# Patient Record
Sex: MALE | Race: WHITE | NOT HISPANIC OR LATINO
[De-identification: names, ages, dates, MRNs, and addresses within clinical notes are randomized per-mention and may not be internally consistent; named-entity substitution may affect disease eponyms.]

---

## 2023-03-17 ENCOUNTER — NON-APPOINTMENT (OUTPATIENT)
Age: 58
End: 2023-03-17

## 2023-03-17 ENCOUNTER — APPOINTMENT (OUTPATIENT)
Dept: COLORECTAL SURGERY | Facility: CLINIC | Age: 58
End: 2023-03-17
Payer: COMMERCIAL

## 2023-03-17 VITALS
HEART RATE: 76 BPM | DIASTOLIC BLOOD PRESSURE: 75 MMHG | BODY MASS INDEX: 25.8 KG/M2 | TEMPERATURE: 97.9 F | WEIGHT: 201 LBS | SYSTOLIC BLOOD PRESSURE: 112 MMHG | HEIGHT: 74 IN

## 2023-03-17 PROBLEM — Z00.00 ENCOUNTER FOR PREVENTIVE HEALTH EXAMINATION: Status: ACTIVE | Noted: 2023-03-17

## 2023-03-17 PROCEDURE — 99204 OFFICE O/P NEW MOD 45 MIN: CPT

## 2023-03-17 NOTE — PHYSICAL EXAM
[No HSM] : no hepatosplenomegaly [Normal] : was normal [None] : there was no rectal mass  [No Rash or Lesion] : No rash or lesion [Alert] : alert [Abdomen Masses] : No abdominal masses [Abdomen Tenderness] : ~T No ~M abdominal tenderness [FreeTextEntry1] : Medical assistant was present for the entire exam.\par \par Anoscopy was performed for evaluation of the patients rectal bleeding  history .\par The risks, benefits and alternatives were reviewed.\par \par A lighted anoscope was passed into the anal canal and the entire anal mucosal surface was inspected..  \par The findings revealed moderate internal hemorrhoids.\par No masses or lesions were identified.\par \par

## 2023-03-17 NOTE — ASSESSMENT
[FreeTextEntry1] : Reviewed with the patient and his wife that his history and prior surgical procedures are consistent with a GI bleed of unclear source.  Although I suspect that his bleeding is coming from a diverticular etiology–right-sided diverticular disease I have expressed to them that it would be most beneficial to further exclude secondary sources of GI bleeding before secondary surgical procedures are planned.  Therefore I have recommended that if he has recurrent bleeding he return to the hospital for a CT angiogram.  If negative a provocative angiogram would be appropriate.  If his small bowel and secondary source of bleeding is not identified and or a primary source of colonic bleeding is identified we will proceed with a definitive resection of the remaining colon–completion colectomy and ileorectal anastomosis.  I have outlined to them the risk, benefits alternatives of surgery.  All questions answered.

## 2023-03-22 ENCOUNTER — NON-APPOINTMENT (OUTPATIENT)
Age: 58
End: 2023-03-22

## 2023-04-03 ENCOUNTER — APPOINTMENT (OUTPATIENT)
Dept: COLORECTAL SURGERY | Facility: CLINIC | Age: 58
End: 2023-04-03
Payer: COMMERCIAL

## 2023-04-03 DIAGNOSIS — Z86.010 PERSONAL HISTORY OF COLONIC POLYPS: ICD-10-CM

## 2023-04-03 DIAGNOSIS — Z87.19 PERSONAL HISTORY OF OTHER DISEASES OF THE DIGESTIVE SYSTEM: ICD-10-CM

## 2023-04-03 DIAGNOSIS — K92.2 GASTROINTESTINAL HEMORRHAGE, UNSPECIFIED: ICD-10-CM

## 2023-04-03 DIAGNOSIS — Z87.442 PERSONAL HISTORY OF URINARY CALCULI: ICD-10-CM

## 2023-04-03 DIAGNOSIS — I10 ESSENTIAL (PRIMARY) HYPERTENSION: ICD-10-CM

## 2023-04-03 DIAGNOSIS — E78.5 HYPERLIPIDEMIA, UNSPECIFIED: ICD-10-CM

## 2023-04-03 DIAGNOSIS — Z82.49 FAMILY HISTORY OF ISCHEMIC HEART DISEASE AND OTHER DISEASES OF THE CIRCULATORY SYSTEM: ICD-10-CM

## 2023-04-03 DIAGNOSIS — G47.30 SLEEP APNEA, UNSPECIFIED: ICD-10-CM

## 2023-04-03 DIAGNOSIS — Z78.9 OTHER SPECIFIED HEALTH STATUS: ICD-10-CM

## 2023-04-03 PROCEDURE — 99202 OFFICE O/P NEW SF 15 MIN: CPT | Mod: 95

## 2023-04-05 NOTE — ASSESSMENT
[FreeTextEntry1] : Pt with ileal diverticulum and severe diverticulosis throughout the colon, h/o anemia and syncopal episodes 2/2 GI bleed. Pt presents for surgical planning discussion and has been recommended to proceed with completion colectomy with ileorectal anastomosis.\par \par Patient was informed of risk, benefits and alternatives of surgery including but limited risk of bleeding, infection, hernia, obstruction, change in bowel habits, need for future procedures, risk of recurrent bleeding, risk of anastomotic leak, change in bowel habits with increased stool frequency, need for ileostomy or colostomy creation, heart or lung complications.\par \par

## 2023-04-05 NOTE — HISTORY OF PRESENT ILLNESS
[Home] : at home, [unfilled] , at the time of the visit. [Medical Office: ([facility])___] : at the medical office located in  [Other:____] : [unfilled] [FreeTextEntry1] : 56 yo M w/ severe diverticulosis presents for follow up surgical discussion of GI bleeding\par Known to LAMIN Deleon\par PMH HTN, HLD, sleep apnea, kidney stones\par PSH hernia repair, partial colon resection, h/o RBL x 2 (1990s, and recently w/ Dr. Deleon)\par \par h/o heavy rectal bleeding over the years. Worsened in 2018 and had rectal bleeding a few times while at work. Had presented to ERs in the past, colonoscopies have been completed multiple times, no identifiable source of bleeding. 3/2018 h/o syncopal episode, seen at [facility], s/p transfusions x 3. \par April 2019 seen by CRS Korey Godinez who suggested colon resection for diverticulosis as possible source of bleed\par Aug 2021 Hospitalized again at [facility] for rectal bleeding, s/p blood transfusion x 3, transferred to [facility] in [place], bleeding eventually resolved. Colonoscopy completed, s/p partial colon resection 10/2021 w/ Dr. Godinez for "bleeding vein"\par \par Pt had been doing well and no bleeding episodes until December 2022. Pt had return of bleeding in setting of straining and presented to [facility], hospitalized, colonoscopy performed and area of active bleed stapled per pt. A couple months later had bleeding again which resolved on its own. Colonoscopy noted polyp which was removed. \par Had BRB again, seen at [facility] ER, hgb over 7, no imaging or interventions per pt.\par Seen by LAMIN Deleon, s/p rubber band ligation ~ one month ago which he tolerated well.\par \par On 3/9, pt had three instances of heavy bleeding. Seen at [facility] ER, had more rectal bleeding, pt reports while waiting he stood up, had syncopal episode, then pt noticed "spurting" blood, s/p transfusion. \par \par Colonoscopy completed inpatient on 3/10/12 noted few small diverticulum 10 in ileum 10 cm from ICV as well as innumerable small and large diverticula in the entire colon. No evidence of diverticular bleeding. A 5 mm post hemorrhoidal banding scar w/ adjusent clot was found in the rectum. No active bleeding in rectum. Non bleeding internal hemorrhoids noted.\par \par Last seen for initial consult 3/17/23 for post ER follow up. Pt recommended to present to ER if bleeding recurs. In interim, pt has contacted office several times requesting surgery.\par \par Pt presents for further discussion and denies return of rectal bleeding. Pt has been taking Metamucil, stool softeners as recommended as well as drinking herbal teas (chamomile, mint) in the meantime and has 1-2 daily BMs w/ complete evacuation after 2nd BM.\par \par \par

## 2023-04-11 RX ORDER — CIPROFLOXACIN HYDROCHLORIDE 500 MG/1
500 TABLET, FILM COATED ORAL
Qty: 2 | Refills: 0 | Status: ACTIVE | COMMUNITY
Start: 2023-04-11 | End: 1900-01-01

## 2023-04-11 RX ORDER — METRONIDAZOLE 500 MG/1
500 TABLET ORAL
Qty: 6 | Refills: 0 | Status: ACTIVE | COMMUNITY
Start: 2023-04-11 | End: 1900-01-01

## 2023-04-24 ENCOUNTER — TRANSCRIPTION ENCOUNTER (OUTPATIENT)
Age: 58
End: 2023-04-24

## 2023-04-24 VITALS
OXYGEN SATURATION: 100 % | WEIGHT: 202.6 LBS | SYSTOLIC BLOOD PRESSURE: 143 MMHG | DIASTOLIC BLOOD PRESSURE: 79 MMHG | HEIGHT: 75 IN | TEMPERATURE: 98 F | HEART RATE: 61 BPM | RESPIRATION RATE: 16 BRPM

## 2023-04-24 NOTE — PATIENT PROFILE ADULT - MONEY FOR FOOD
PT presents for ECG ordered by [facility] NP.    ECG done and given to [facility] to read.    Will call pt with results.    no

## 2023-04-25 ENCOUNTER — RESULT REVIEW (OUTPATIENT)
Age: 58
End: 2023-04-25

## 2023-04-25 ENCOUNTER — APPOINTMENT (OUTPATIENT)
Dept: COLORECTAL SURGERY | Facility: HOSPITAL | Age: 58
End: 2023-04-25
Payer: COMMERCIAL

## 2023-04-25 ENCOUNTER — INPATIENT (INPATIENT)
Facility: HOSPITAL | Age: 58
LOS: 13 days | Discharge: HOME HEALTH | DRG: 330 | End: 2023-05-09
Attending: SURGERY | Admitting: SURGERY
Payer: COMMERCIAL

## 2023-04-25 DIAGNOSIS — K57.90 DIVERTICULOSIS OF INTESTINE, PART UNSPECIFIED, WITHOUT PERFORATION OR ABSCESS WITHOUT BLEEDING: ICD-10-CM

## 2023-04-25 DIAGNOSIS — Z98.890 OTHER SPECIFIED POSTPROCEDURAL STATES: Chronic | ICD-10-CM

## 2023-04-25 DIAGNOSIS — Z90.49 ACQUIRED ABSENCE OF OTHER SPECIFIED PARTS OF DIGESTIVE TRACT: Chronic | ICD-10-CM

## 2023-04-25 LAB
BLD GP AB SCN SERPL QL: NEGATIVE — SIGNIFICANT CHANGE UP
RH IG SCN BLD-IMP: POSITIVE — SIGNIFICANT CHANGE UP

## 2023-04-25 PROCEDURE — 44150 REMOVAL OF COLON: CPT | Mod: GC

## 2023-04-25 PROCEDURE — 88304 TISSUE EXAM BY PATHOLOGIST: CPT | Mod: 26

## 2023-04-25 PROCEDURE — 88307 TISSUE EXAM BY PATHOLOGIST: CPT | Mod: 26

## 2023-04-25 DEVICE — STAPLER COVIDIEN EEA CIRCULAR DST 28MM 4.5MM BLUE: Type: IMPLANTABLE DEVICE | Status: FUNCTIONAL

## 2023-04-25 DEVICE — STAPLER ECHELON CONTOUR CURVED CUTTER 40MM WITH (GREEN) RELOAD: Type: IMPLANTABLE DEVICE | Status: FUNCTIONAL

## 2023-04-25 DEVICE — STAPLER COVIDIEN TRI-STAPLE 60MM PURPLE RELOAD: Type: IMPLANTABLE DEVICE | Status: FUNCTIONAL

## 2023-04-25 DEVICE — CLIP APPLIER ETHICON LIGACLIP 11.5" MEDIUM: Type: IMPLANTABLE DEVICE | Status: FUNCTIONAL

## 2023-04-25 RX ORDER — HYDROMORPHONE HYDROCHLORIDE 2 MG/ML
30 INJECTION INTRAMUSCULAR; INTRAVENOUS; SUBCUTANEOUS
Refills: 0 | Status: DISCONTINUED | OUTPATIENT
Start: 2023-04-25 | End: 2023-04-27

## 2023-04-25 RX ORDER — DILTIAZEM HCL 120 MG
360 CAPSULE, EXT RELEASE 24 HR ORAL DAILY
Refills: 0 | Status: DISCONTINUED | OUTPATIENT
Start: 2023-04-26 | End: 2023-05-09

## 2023-04-25 RX ORDER — HYDROMORPHONE HYDROCHLORIDE 2 MG/ML
0.5 INJECTION INTRAMUSCULAR; INTRAVENOUS; SUBCUTANEOUS
Refills: 0 | Status: DISCONTINUED | OUTPATIENT
Start: 2023-04-25 | End: 2023-04-25

## 2023-04-25 RX ORDER — ACETAMINOPHEN 500 MG
1000 TABLET ORAL ONCE
Refills: 0 | Status: COMPLETED | OUTPATIENT
Start: 2023-04-25 | End: 2023-04-25

## 2023-04-25 RX ORDER — FENOFIBRATE,MICRONIZED 130 MG
145 CAPSULE ORAL DAILY
Refills: 0 | Status: DISCONTINUED | OUTPATIENT
Start: 2023-04-25 | End: 2023-05-09

## 2023-04-25 RX ORDER — HEPARIN SODIUM 5000 [USP'U]/ML
5000 INJECTION INTRAVENOUS; SUBCUTANEOUS ONCE
Refills: 0 | Status: COMPLETED | OUTPATIENT
Start: 2023-04-25 | End: 2023-04-25

## 2023-04-25 RX ORDER — ONDANSETRON 8 MG/1
4 TABLET, FILM COATED ORAL EVERY 6 HOURS
Refills: 0 | Status: DISCONTINUED | OUTPATIENT
Start: 2023-04-25 | End: 2023-05-09

## 2023-04-25 RX ORDER — DILTIAZEM HCL 120 MG
360 CAPSULE, EXT RELEASE 24 HR ORAL DAILY
Refills: 0 | Status: DISCONTINUED | OUTPATIENT
Start: 2023-04-25 | End: 2023-04-25

## 2023-04-25 RX ORDER — HYDROMORPHONE HYDROCHLORIDE 2 MG/ML
0.5 INJECTION INTRAMUSCULAR; INTRAVENOUS; SUBCUTANEOUS ONCE
Refills: 0 | Status: DISCONTINUED | OUTPATIENT
Start: 2023-04-25 | End: 2023-04-25

## 2023-04-25 RX ORDER — SODIUM CHLORIDE 9 MG/ML
1000 INJECTION, SOLUTION INTRAVENOUS
Refills: 0 | Status: DISCONTINUED | OUTPATIENT
Start: 2023-04-25 | End: 2023-04-27

## 2023-04-25 RX ORDER — KETOROLAC TROMETHAMINE 30 MG/ML
15 SYRINGE (ML) INJECTION EVERY 6 HOURS
Refills: 0 | Status: DISCONTINUED | OUTPATIENT
Start: 2023-04-25 | End: 2023-04-28

## 2023-04-25 RX ORDER — ACETAMINOPHEN 500 MG
1000 TABLET ORAL EVERY 6 HOURS
Refills: 0 | Status: DISCONTINUED | OUTPATIENT
Start: 2023-04-25 | End: 2023-05-01

## 2023-04-25 RX ORDER — NALOXONE HYDROCHLORIDE 4 MG/.1ML
0.1 SPRAY NASAL
Refills: 0 | Status: DISCONTINUED | OUTPATIENT
Start: 2023-04-25 | End: 2023-05-09

## 2023-04-25 RX ORDER — PANTOPRAZOLE SODIUM 20 MG/1
40 TABLET, DELAYED RELEASE ORAL
Refills: 0 | Status: DISCONTINUED | OUTPATIENT
Start: 2023-04-25 | End: 2023-05-02

## 2023-04-25 RX ORDER — HEPARIN SODIUM 5000 [USP'U]/ML
5000 INJECTION INTRAVENOUS; SUBCUTANEOUS EVERY 8 HOURS
Refills: 0 | Status: DISCONTINUED | OUTPATIENT
Start: 2023-04-25 | End: 2023-05-01

## 2023-04-25 RX ORDER — HYDROMORPHONE HYDROCHLORIDE 2 MG/ML
0.5 INJECTION INTRAMUSCULAR; INTRAVENOUS; SUBCUTANEOUS EVERY 4 HOURS
Refills: 0 | Status: DISCONTINUED | OUTPATIENT
Start: 2023-04-25 | End: 2023-04-25

## 2023-04-25 RX ADMIN — HEPARIN SODIUM 5000 UNIT(S): 5000 INJECTION INTRAVENOUS; SUBCUTANEOUS at 19:25

## 2023-04-25 RX ADMIN — Medication 15 MILLIGRAM(S): at 18:13

## 2023-04-25 RX ADMIN — HYDROMORPHONE HYDROCHLORIDE 0.5 MILLIGRAM(S): 2 INJECTION INTRAMUSCULAR; INTRAVENOUS; SUBCUTANEOUS at 14:06

## 2023-04-25 RX ADMIN — HEPARIN SODIUM 5000 UNIT(S): 5000 INJECTION INTRAVENOUS; SUBCUTANEOUS at 08:14

## 2023-04-25 RX ADMIN — Medication 1000 MILLIGRAM(S): at 23:25

## 2023-04-25 RX ADMIN — Medication 1000 MILLIGRAM(S): at 18:13

## 2023-04-25 RX ADMIN — Medication 1000 MILLIGRAM(S): at 14:39

## 2023-04-25 RX ADMIN — HYDROMORPHONE HYDROCHLORIDE 30 MILLILITER(S): 2 INJECTION INTRAMUSCULAR; INTRAVENOUS; SUBCUTANEOUS at 17:13

## 2023-04-25 RX ADMIN — HYDROMORPHONE HYDROCHLORIDE 0.5 MILLIGRAM(S): 2 INJECTION INTRAMUSCULAR; INTRAVENOUS; SUBCUTANEOUS at 13:25

## 2023-04-25 RX ADMIN — HYDROMORPHONE HYDROCHLORIDE 0.5 MILLIGRAM(S): 2 INJECTION INTRAMUSCULAR; INTRAVENOUS; SUBCUTANEOUS at 14:57

## 2023-04-25 RX ADMIN — Medication 400 MILLIGRAM(S): at 14:35

## 2023-04-25 RX ADMIN — Medication 1000 MILLIGRAM(S): at 08:11

## 2023-04-25 RX ADMIN — Medication 15 MILLIGRAM(S): at 23:40

## 2023-04-25 RX ADMIN — Medication 15 MILLIGRAM(S): at 23:24

## 2023-04-25 RX ADMIN — HYDROMORPHONE HYDROCHLORIDE 0.5 MILLIGRAM(S): 2 INJECTION INTRAMUSCULAR; INTRAVENOUS; SUBCUTANEOUS at 14:00

## 2023-04-25 NOTE — BRIEF OPERATIVE NOTE - OPERATION/FINDINGS
Open completion colectomy (total) with ileorectal anastomosis and flexible proctoscopy: Antibiotic and DVT prophylaxis on board. Patient supine and prepped in standard fashion. Time out done. Intra-abdominal cavity insufflated with Veress needled at Juarez's point. Optiview 5mm trocar used to enter at Juarez's point and immediately we saw extensive adhesions to midline, extending all the way down to the pelvis. At this point, conversion to open was done through midline and lysis of adhesions carried out with electrocautery. No bowel injuries noted. Started our dissection around rectum to access sacral space and release prior anastomosis (of note, anastomosis was tattooed). Identification of bilateral iliac vessels as well as ureters done. Rectum freed up and transected with circular stapler. No complications. Proceeded to take down descending, transverse and ascending colon from lateral to medial by dividing Toldtz. Mesenteric vascular supply (ileocolic, right, middle and left colic) was carefully divided using energy device and hemostasis was confirmed. Healthy ileum identified and transected. Specimen sent to pathology. Side to end ileorectal anastomosis was created using EEA 28. No complications.  Flexible proctoscopy done to confirm adequate anastomosis and hemostasis. Negative leak test. No drains were placed. No spillage either. Hemostasis confirmed again. Fascia closed with two double stranded PDS 0 running fashion. Wound extensively irrigated with saline and midline wound closed with staples. Clean dressings applied. Trocar wound closed with monocryl and dermabond. Patient tolerated procedure well and was sent to recovery room in stable condition.

## 2023-04-25 NOTE — PROGRESS NOTE ADULT - SUBJECTIVE AND OBJECTIVE BOX
POST OPERATIVE CHECK    S: Denies n/v/cp/sob. Having pain with breathing or laughing that is poorly controlled.     O:     T(C): 36.3 (04-25-23 @ 12:25), Max: 36.4 (04-25-23 @ 08:21)  HR: 66 (04-25-23 @ 14:05) (58 - 84)  BP: 115/66 (04-25-23 @ 13:55) (110/66 - 143/79)  RR: 16 (04-25-23 @ 12:55) (16 - 16)  SpO2: 97% (04-25-23 @ 14:05) (95% - 100%)    Physical Exam:     General: Awake, alert. Appears uncomfortable.    CV: HDS, RRR. WWP  Pulm: 2L NC  Abd: s/nt/nd. Midline laparotomy incision w/ some strikethrough. Port site c/d/i.    Extremity: SCDs on and functioning.   T/L/D: Mcmanus w/ clear yellow urine.     I/O (24h)  O: UOP ~60/hr     Labs: No labs     A/P: 57 year old male with PMH of HTN, HLD, JOSE, diverticulitis presents for colon resection now s/p open total colectomy 4/25    ERAS  CLD/IVF  Pain nausea control  SQH/SCDs/OOBA  Home Cardizem

## 2023-04-25 NOTE — H&P ADULT - ASSESSMENT
56 yo M presents for evaluation of "heavy rectal bleeding," referred by Dr. Aime Deleon  PMH HTN, HLD, sleep apnea, kidney stones  PSH hernia repair, partial colon resection, h/o RBL x 2 (1990s, and recently w/ Dr. Deleon)     Patient has been extensively evaluated at clinics for heavy rectal bleeding that has not been responsive to other means of management. Extensive workup has been done and consistent with pancolonic diverticulosis.     Plan: To OR for robotic completion colectomy with possible ileorectal anastomosis, possible ileostomy, possible colostomy, possible open; with admission to colorectal service for perioperative care and monitoring.

## 2023-04-25 NOTE — H&P ADULT - REASON FOR ADMISSION
58 y/o male patient with extensive colonic diverticulitis and rectal bleeding; comes in for colorectal surgery management and admission to surgery valdovinos for perioperative care.

## 2023-04-26 LAB
ANION GAP SERPL CALC-SCNC: 13 MMOL/L — SIGNIFICANT CHANGE UP (ref 5–17)
BUN SERPL-MCNC: 15 MG/DL — SIGNIFICANT CHANGE UP (ref 7–23)
CALCIUM SERPL-MCNC: 8.7 MG/DL — SIGNIFICANT CHANGE UP (ref 8.4–10.5)
CHLORIDE SERPL-SCNC: 103 MMOL/L — SIGNIFICANT CHANGE UP (ref 96–108)
CO2 SERPL-SCNC: 22 MMOL/L — SIGNIFICANT CHANGE UP (ref 22–31)
CREAT SERPL-MCNC: 0.92 MG/DL — SIGNIFICANT CHANGE UP (ref 0.5–1.3)
EGFR: 96 ML/MIN/1.73M2 — SIGNIFICANT CHANGE UP
GLUCOSE SERPL-MCNC: 136 MG/DL — HIGH (ref 70–99)
HCT VFR BLD CALC: 31.4 % — LOW (ref 39–50)
HCV AB S/CO SERPL IA: 0.3 S/CO — SIGNIFICANT CHANGE UP (ref 0–0.99)
HCV AB SERPL-IMP: SIGNIFICANT CHANGE UP
HGB BLD-MCNC: 9.3 G/DL — LOW (ref 13–17)
MAGNESIUM SERPL-MCNC: 2 MG/DL — SIGNIFICANT CHANGE UP (ref 1.6–2.6)
MCHC RBC-ENTMCNC: 21.8 PG — LOW (ref 27–34)
MCHC RBC-ENTMCNC: 29.6 GM/DL — LOW (ref 32–36)
MCV RBC AUTO: 73.7 FL — LOW (ref 80–100)
NRBC # BLD: 0 /100 WBCS — SIGNIFICANT CHANGE UP (ref 0–0)
PHOSPHATE SERPL-MCNC: 3.2 MG/DL — SIGNIFICANT CHANGE UP (ref 2.5–4.5)
PLATELET # BLD AUTO: 383 K/UL — SIGNIFICANT CHANGE UP (ref 150–400)
POTASSIUM SERPL-MCNC: 3.6 MMOL/L — SIGNIFICANT CHANGE UP (ref 3.5–5.3)
POTASSIUM SERPL-SCNC: 3.6 MMOL/L — SIGNIFICANT CHANGE UP (ref 3.5–5.3)
RBC # BLD: 4.26 M/UL — SIGNIFICANT CHANGE UP (ref 4.2–5.8)
RBC # FLD: 21.6 % — HIGH (ref 10.3–14.5)
SODIUM SERPL-SCNC: 138 MMOL/L — SIGNIFICANT CHANGE UP (ref 135–145)
WBC # BLD: 10.22 K/UL — SIGNIFICANT CHANGE UP (ref 3.8–10.5)
WBC # FLD AUTO: 10.22 K/UL — SIGNIFICANT CHANGE UP (ref 3.8–10.5)

## 2023-04-26 RX ORDER — CALCIUM CARBONATE 500(1250)
1 TABLET ORAL ONCE
Refills: 0 | Status: COMPLETED | OUTPATIENT
Start: 2023-04-26 | End: 2023-04-26

## 2023-04-26 RX ORDER — POTASSIUM CHLORIDE 20 MEQ
40 PACKET (EA) ORAL ONCE
Refills: 0 | Status: COMPLETED | OUTPATIENT
Start: 2023-04-26 | End: 2023-04-26

## 2023-04-26 RX ADMIN — Medication 1000 MILLIGRAM(S): at 00:05

## 2023-04-26 RX ADMIN — Medication 40 MILLIEQUIVALENT(S): at 09:16

## 2023-04-26 RX ADMIN — SODIUM CHLORIDE 40 MILLILITER(S): 9 INJECTION, SOLUTION INTRAVENOUS at 07:49

## 2023-04-26 RX ADMIN — Medication 145 MILLIGRAM(S): at 16:26

## 2023-04-26 RX ADMIN — Medication 1000 MILLIGRAM(S): at 05:21

## 2023-04-26 RX ADMIN — Medication 360 MILLIGRAM(S): at 05:48

## 2023-04-26 RX ADMIN — Medication 1 TABLET(S): at 00:42

## 2023-04-26 RX ADMIN — Medication 1000 MILLIGRAM(S): at 06:00

## 2023-04-26 RX ADMIN — HYDROMORPHONE HYDROCHLORIDE 30 MILLILITER(S): 2 INJECTION INTRAMUSCULAR; INTRAVENOUS; SUBCUTANEOUS at 18:57

## 2023-04-26 RX ADMIN — HEPARIN SODIUM 5000 UNIT(S): 5000 INJECTION INTRAVENOUS; SUBCUTANEOUS at 05:22

## 2023-04-26 RX ADMIN — PANTOPRAZOLE SODIUM 40 MILLIGRAM(S): 20 TABLET, DELAYED RELEASE ORAL at 05:21

## 2023-04-26 RX ADMIN — Medication 15 MILLIGRAM(S): at 05:22

## 2023-04-26 RX ADMIN — Medication 15 MILLIGRAM(S): at 06:00

## 2023-04-26 RX ADMIN — Medication 1000 MILLIGRAM(S): at 12:01

## 2023-04-26 RX ADMIN — HEPARIN SODIUM 5000 UNIT(S): 5000 INJECTION INTRAVENOUS; SUBCUTANEOUS at 12:01

## 2023-04-26 RX ADMIN — HEPARIN SODIUM 5000 UNIT(S): 5000 INJECTION INTRAVENOUS; SUBCUTANEOUS at 20:11

## 2023-04-26 RX ADMIN — Medication 15 MILLIGRAM(S): at 12:01

## 2023-04-26 NOTE — PHYSICAL THERAPY INITIAL EVALUATION ADULT - PERTINENT HX OF CURRENT PROBLEM, REHAB EVAL
58 yo M presents for evaluation of "heavy rectal bleeding," referred by Dr. Aime Deleon  PMH HTN, HLD, sleep apnea, kidney stones  PSH hernia repair, partial colon resection, h/o RBL x 2 (1990s, and recently w/ Dr. Deleon)     Patient has been extensively evaluated at clinics for heavy rectal bleeding that has not been responsive to other means of management. Extensive workup has been done and consistent with pancolonic diverticulosis.     Plan: To OR for robotic completion colectomy with possible ileorectal anastomosis, possible ileostomy, possible colostomy, possible open; with admission to colorectal service for perioperative care and monitoring.
none

## 2023-04-26 NOTE — PROVIDER CONTACT NOTE (OTHER) - SITUATION
Pt has a current provider note that was charted on the wrong pt. The wrong note was charted at 0703 by TRIP Lee regarding K level. Please disregard. Confirmed with Throughput RN and PA.
heart rate dropped to 48 bpm. pt asymptomatic.

## 2023-04-26 NOTE — ANESTHESIA FOLLOW-UP NOTE - NSEVALATIONFT_GEN_ALL_CORE
Patient seen at bedside this AM. Resting comfortably in NAD. Reports mild incisional site pain being controlled with PCA. No apparent anesthetic issues or concerns. VSS

## 2023-04-26 NOTE — PROGRESS NOTE ADULT - SUBJECTIVE AND OBJECTIVE BOX
INTERVAL HPI/OVERNIGHT EVENTS: CLD, -f/-bm. mild distension/NT    STATUS POST:  4/25: diagnostic laparoscopy converted to open total colectomy    POST OPERATIVE DAY #: 1    SUBJECTIVE: Pt seen and examined at bedside this am by surgery team. Reports feeling tired, otherwise no acute complaints. Tolerating diet, pain well controlled. Denies f/n/v/cp/sob.    MEDICATIONS  (STANDING):  acetaminophen     Tablet .. 1000 milliGRAM(s) Oral every 6 hours  diltiazem    milliGRAM(s) Oral daily  fenofibrate Tablet 145 milliGRAM(s) Oral daily  heparin   Injectable 5000 Unit(s) SubCutaneous every 8 hours  HYDROmorphone PCA (1 mG/mL) 30 milliLiter(s) PCA Continuous PCA Continuous  ketorolac   Injectable 15 milliGRAM(s) IV Push every 6 hours  lactated ringers. 1000 milliLiter(s) (40 mL/Hr) IV Continuous <Continuous>  pantoprazole    Tablet 40 milliGRAM(s) Oral before breakfast    MEDICATIONS  (PRN):  naloxone Injectable 0.1 milliGRAM(s) IV Push every 3 minutes PRN For ANY of the following changes in patient status:  A. RR LESS THAN 10 breaths per minute, B. Oxygen saturation LESS THAN 90%, C. Sedation score of 6  ondansetron Injectable 4 milliGRAM(s) IV Push every 6 hours PRN Nausea and/or Vomiting      Vital Signs Last 24 Hrs  T(C): 36.8 (26 Apr 2023 05:25), Max: 36.8 (26 Apr 2023 05:25)  T(F): 98.2 (26 Apr 2023 05:25), Max: 98.2 (26 Apr 2023 05:25)  HR: 66 (26 Apr 2023 03:25) (58 - 84)  BP: 137/78 (26 Apr 2023 03:25) (110/66 - 143/79)  BP(mean): 100 (26 Apr 2023 03:25) (82 - 102)  RR: 16 (26 Apr 2023 03:25) (16 - 18)  SpO2: 94% (26 Apr 2023 03:25) (93% - 100%)    Parameters below as of 26 Apr 2023 03:25  Patient On (Oxygen Delivery Method): room air    PHYSICAL EXAM:    Constitutional: A&Ox3, NAD    Respiratory: non labored breathing, no respiratory distress    Cardiovascular: NSR, RRR    Gastrointestinal: abdomen soft, nd, mdline dressing c/d/i, nt, no rebound or guarding                   Genitourinary: Mcmanus in place draining clear yellow urine     Extremities: wwp, no calf tenderness or edema. SCDs in place       I&O's Detail    25 Apr 2023 07:01  -  26 Apr 2023 07:00  --------------------------------------------------------  IN:    Lactated Ringers: 720 mL    Oral Fluid: 300 mL  Total IN: 1020 mL    OUT:    Indwelling Catheter - Urethral (mL): 1625 mL  Total OUT: 1625 mL    Total NET: -605 mL          LABS:                        9.3    10.22 )-----------( 383      ( 26 Apr 2023 05:30 )             31.4     04-26    138  |  103  |  15  ----------------------------<  136<H>  3.6   |  22  |  0.92    Ca    8.7      26 Apr 2023 05:30  Phos  3.2     04-26  Mg     2.0     04-26            RADIOLOGY & ADDITIONAL STUDIES:

## 2023-04-27 LAB
ANION GAP SERPL CALC-SCNC: 11 MMOL/L — SIGNIFICANT CHANGE UP (ref 5–17)
BUN SERPL-MCNC: 16 MG/DL — SIGNIFICANT CHANGE UP (ref 7–23)
CALCIUM SERPL-MCNC: 9 MG/DL — SIGNIFICANT CHANGE UP (ref 8.4–10.5)
CHLORIDE SERPL-SCNC: 108 MMOL/L — SIGNIFICANT CHANGE UP (ref 96–108)
CO2 SERPL-SCNC: 24 MMOL/L — SIGNIFICANT CHANGE UP (ref 22–31)
CREAT SERPL-MCNC: 1.09 MG/DL — SIGNIFICANT CHANGE UP (ref 0.5–1.3)
EGFR: 78 ML/MIN/1.73M2 — SIGNIFICANT CHANGE UP
GLUCOSE SERPL-MCNC: 91 MG/DL — SIGNIFICANT CHANGE UP (ref 70–99)
HCT VFR BLD CALC: 32 % — LOW (ref 39–50)
HGB BLD-MCNC: 9.5 G/DL — LOW (ref 13–17)
MAGNESIUM SERPL-MCNC: 2 MG/DL — SIGNIFICANT CHANGE UP (ref 1.6–2.6)
MCHC RBC-ENTMCNC: 22.1 PG — LOW (ref 27–34)
MCHC RBC-ENTMCNC: 29.7 GM/DL — LOW (ref 32–36)
MCV RBC AUTO: 74.6 FL — LOW (ref 80–100)
NRBC # BLD: 0 /100 WBCS — SIGNIFICANT CHANGE UP (ref 0–0)
PHOSPHATE SERPL-MCNC: 2.2 MG/DL — LOW (ref 2.5–4.5)
PLATELET # BLD AUTO: 376 K/UL — SIGNIFICANT CHANGE UP (ref 150–400)
POTASSIUM SERPL-MCNC: 3.6 MMOL/L — SIGNIFICANT CHANGE UP (ref 3.5–5.3)
POTASSIUM SERPL-SCNC: 3.6 MMOL/L — SIGNIFICANT CHANGE UP (ref 3.5–5.3)
RBC # BLD: 4.29 M/UL — SIGNIFICANT CHANGE UP (ref 4.2–5.8)
RBC # FLD: 22.5 % — HIGH (ref 10.3–14.5)
SODIUM SERPL-SCNC: 143 MMOL/L — SIGNIFICANT CHANGE UP (ref 135–145)
WBC # BLD: 8 K/UL — SIGNIFICANT CHANGE UP (ref 3.8–10.5)
WBC # FLD AUTO: 8 K/UL — SIGNIFICANT CHANGE UP (ref 3.8–10.5)

## 2023-04-27 RX ORDER — SIMETHICONE 80 MG/1
80 TABLET, CHEWABLE ORAL ONCE
Refills: 0 | Status: COMPLETED | OUTPATIENT
Start: 2023-04-27 | End: 2023-04-27

## 2023-04-27 RX ORDER — POTASSIUM PHOSPHATE, MONOBASIC POTASSIUM PHOSPHATE, DIBASIC 236; 224 MG/ML; MG/ML
15 INJECTION, SOLUTION INTRAVENOUS ONCE
Refills: 0 | Status: COMPLETED | OUTPATIENT
Start: 2023-04-27 | End: 2023-04-27

## 2023-04-27 RX ORDER — OXYCODONE HYDROCHLORIDE 5 MG/1
5 TABLET ORAL EVERY 6 HOURS
Refills: 0 | Status: DISCONTINUED | OUTPATIENT
Start: 2023-04-27 | End: 2023-05-01

## 2023-04-27 RX ORDER — HYDROMORPHONE HYDROCHLORIDE 2 MG/ML
30 INJECTION INTRAMUSCULAR; INTRAVENOUS; SUBCUTANEOUS
Refills: 0 | Status: DISCONTINUED | OUTPATIENT
Start: 2023-04-27 | End: 2023-04-27

## 2023-04-27 RX ADMIN — HEPARIN SODIUM 5000 UNIT(S): 5000 INJECTION INTRAVENOUS; SUBCUTANEOUS at 19:12

## 2023-04-27 RX ADMIN — HEPARIN SODIUM 5000 UNIT(S): 5000 INJECTION INTRAVENOUS; SUBCUTANEOUS at 04:59

## 2023-04-27 RX ADMIN — Medication 1000 MILLIGRAM(S): at 23:25

## 2023-04-27 RX ADMIN — SIMETHICONE 80 MILLIGRAM(S): 80 TABLET, CHEWABLE ORAL at 01:27

## 2023-04-27 RX ADMIN — HEPARIN SODIUM 5000 UNIT(S): 5000 INJECTION INTRAVENOUS; SUBCUTANEOUS at 12:57

## 2023-04-27 RX ADMIN — Medication 15 MILLIGRAM(S): at 18:01

## 2023-04-27 RX ADMIN — PANTOPRAZOLE SODIUM 40 MILLIGRAM(S): 20 TABLET, DELAYED RELEASE ORAL at 07:07

## 2023-04-27 RX ADMIN — Medication 145 MILLIGRAM(S): at 12:57

## 2023-04-27 RX ADMIN — SODIUM CHLORIDE 40 MILLILITER(S): 9 INJECTION, SOLUTION INTRAVENOUS at 12:58

## 2023-04-27 RX ADMIN — Medication 15 MILLIGRAM(S): at 12:57

## 2023-04-27 RX ADMIN — POTASSIUM PHOSPHATE, MONOBASIC POTASSIUM PHOSPHATE, DIBASIC 62.5 MILLIMOLE(S): 236; 224 INJECTION, SOLUTION INTRAVENOUS at 12:56

## 2023-04-27 RX ADMIN — Medication 15 MILLIGRAM(S): at 23:25

## 2023-04-27 RX ADMIN — Medication 1000 MILLIGRAM(S): at 18:01

## 2023-04-27 RX ADMIN — Medication 1000 MILLIGRAM(S): at 12:57

## 2023-04-27 RX ADMIN — HYDROMORPHONE HYDROCHLORIDE 30 MILLILITER(S): 2 INJECTION INTRAMUSCULAR; INTRAVENOUS; SUBCUTANEOUS at 07:33

## 2023-04-27 RX ADMIN — Medication 360 MILLIGRAM(S): at 07:07

## 2023-04-27 NOTE — PROGRESS NOTE ADULT - SUBJECTIVE AND OBJECTIVE BOX
DAILY PROGRESS NOTE    S: Feeling mildly gassy and distended. No flatus or bowel movements yet.     O:     T(C): 37.1 (04-27-23 @ 08:22), Max: 37.1 (04-27-23 @ 08:22)  HR: 70 (04-27-23 @ 08:22) (60 - 77)  BP: 144/83 (04-27-23 @ 08:22) (108/66 - 144/83)  RR: 18 (04-27-23 @ 08:22) (18 - 20)  SpO2: 93% (04-27-23 @ 08:22) (91% - 97%)    Physical Exam:     General: Awake, alert.   CV: HDS, WWP  Pulm: On room air  Abd: Soft/distended/appropriately tender.     I/O (24h)  I: 900  O: 1500 uop  N: -600    Labs:                       9.5    8.00  )-----------( 376      ( 27 Apr 2023 05:30 )             32.0   04-27    143  |  108  |  16  ----------------------------<  91  3.6   |  24  |  1.09    Ca    9.0      27 Apr 2023 05:30  Phos  2.2     04-27  Mg     2.0     04-27    A/P: 57M with PMH of HTN, HLD, JOSE (no CPAP), diverticulitis presents for colon resection now s/p dx lap converted to open total colectomy 4/25.    CLD/IVF  PCA for pain control  SQH/SCDs  OOBA/IS  Home Cardizem

## 2023-04-28 LAB
ANION GAP SERPL CALC-SCNC: 11 MMOL/L — SIGNIFICANT CHANGE UP (ref 5–17)
BUN SERPL-MCNC: 21 MG/DL — SIGNIFICANT CHANGE UP (ref 7–23)
CALCIUM SERPL-MCNC: 9.3 MG/DL — SIGNIFICANT CHANGE UP (ref 8.4–10.5)
CHLORIDE SERPL-SCNC: 106 MMOL/L — SIGNIFICANT CHANGE UP (ref 96–108)
CO2 SERPL-SCNC: 21 MMOL/L — LOW (ref 22–31)
CREAT SERPL-MCNC: 1.15 MG/DL — SIGNIFICANT CHANGE UP (ref 0.5–1.3)
EGFR: 73 ML/MIN/1.73M2 — SIGNIFICANT CHANGE UP
GLUCOSE SERPL-MCNC: 105 MG/DL — HIGH (ref 70–99)
HCT VFR BLD CALC: 36.3 % — LOW (ref 39–50)
HGB BLD-MCNC: 10.5 G/DL — LOW (ref 13–17)
MAGNESIUM SERPL-MCNC: 2 MG/DL — SIGNIFICANT CHANGE UP (ref 1.6–2.6)
MCHC RBC-ENTMCNC: 21.6 PG — LOW (ref 27–34)
MCHC RBC-ENTMCNC: 28.9 GM/DL — LOW (ref 32–36)
MCV RBC AUTO: 74.5 FL — LOW (ref 80–100)
NRBC # BLD: 0 /100 WBCS — SIGNIFICANT CHANGE UP (ref 0–0)
PHOSPHATE SERPL-MCNC: 2.6 MG/DL — SIGNIFICANT CHANGE UP (ref 2.5–4.5)
PLATELET # BLD AUTO: 438 K/UL — HIGH (ref 150–400)
POTASSIUM SERPL-MCNC: 3.4 MMOL/L — LOW (ref 3.5–5.3)
POTASSIUM SERPL-SCNC: 3.4 MMOL/L — LOW (ref 3.5–5.3)
RBC # BLD: 4.87 M/UL — SIGNIFICANT CHANGE UP (ref 4.2–5.8)
RBC # FLD: 22.3 % — HIGH (ref 10.3–14.5)
SODIUM SERPL-SCNC: 138 MMOL/L — SIGNIFICANT CHANGE UP (ref 135–145)
WBC # BLD: 9.08 K/UL — SIGNIFICANT CHANGE UP (ref 3.8–10.5)
WBC # FLD AUTO: 9.08 K/UL — SIGNIFICANT CHANGE UP (ref 3.8–10.5)

## 2023-04-28 RX ORDER — SODIUM CHLORIDE 9 MG/ML
1000 INJECTION, SOLUTION INTRAVENOUS
Refills: 0 | Status: DISCONTINUED | OUTPATIENT
Start: 2023-04-28 | End: 2023-05-02

## 2023-04-28 RX ORDER — POTASSIUM CHLORIDE 20 MEQ
40 PACKET (EA) ORAL ONCE
Refills: 0 | Status: COMPLETED | OUTPATIENT
Start: 2023-04-28 | End: 2023-04-28

## 2023-04-28 RX ORDER — SODIUM CHLORIDE 9 MG/ML
1000 INJECTION, SOLUTION INTRAVENOUS ONCE
Refills: 0 | Status: COMPLETED | OUTPATIENT
Start: 2023-04-28 | End: 2023-04-28

## 2023-04-28 RX ADMIN — Medication 15 MILLIGRAM(S): at 11:44

## 2023-04-28 RX ADMIN — PANTOPRAZOLE SODIUM 40 MILLIGRAM(S): 20 TABLET, DELAYED RELEASE ORAL at 06:23

## 2023-04-28 RX ADMIN — Medication 1000 MILLIGRAM(S): at 06:24

## 2023-04-28 RX ADMIN — HEPARIN SODIUM 5000 UNIT(S): 5000 INJECTION INTRAVENOUS; SUBCUTANEOUS at 13:05

## 2023-04-28 RX ADMIN — Medication 360 MILLIGRAM(S): at 06:25

## 2023-04-28 RX ADMIN — Medication 40 MILLIEQUIVALENT(S): at 09:32

## 2023-04-28 RX ADMIN — SODIUM CHLORIDE 40 MILLILITER(S): 9 INJECTION, SOLUTION INTRAVENOUS at 17:58

## 2023-04-28 RX ADMIN — ONDANSETRON 4 MILLIGRAM(S): 8 TABLET, FILM COATED ORAL at 16:28

## 2023-04-28 RX ADMIN — SODIUM CHLORIDE 1000 MILLILITER(S): 9 INJECTION, SOLUTION INTRAVENOUS at 17:25

## 2023-04-28 RX ADMIN — Medication 1000 MILLIGRAM(S): at 15:35

## 2023-04-28 RX ADMIN — Medication 1000 MILLIGRAM(S): at 21:09

## 2023-04-28 RX ADMIN — HEPARIN SODIUM 5000 UNIT(S): 5000 INJECTION INTRAVENOUS; SUBCUTANEOUS at 05:58

## 2023-04-28 RX ADMIN — HEPARIN SODIUM 5000 UNIT(S): 5000 INJECTION INTRAVENOUS; SUBCUTANEOUS at 21:09

## 2023-04-28 NOTE — PROGRESS NOTE ADULT - SUBJECTIVE AND OBJECTIVE BOX
Overnight: tolerating low fiber diet    POD #3: diagnostic laparoscopy converted to open total colectomy     SUBJECTIVE:  Patient is doing well this morning, seen and examined at bedside, denies any new complaints, reports that he had a bilious bowel movement. Denies any nausea, vomiting, chest pain, shortness of breath, calf tenderness, fever or chills. Tolerating diet though reports that he felt full and distended after having brisket last night.     MEDICATIONS  (STANDING):  acetaminophen     Tablet .. 1000 milliGRAM(s) Oral every 6 hours  diltiazem    milliGRAM(s) Oral daily  fenofibrate Tablet 145 milliGRAM(s) Oral daily  heparin   Injectable 5000 Unit(s) SubCutaneous every 8 hours  ketorolac   Injectable 15 milliGRAM(s) IV Push every 6 hours  pantoprazole    Tablet 40 milliGRAM(s) Oral before breakfast    MEDICATIONS  (PRN):  naloxone Injectable 0.1 milliGRAM(s) IV Push every 3 minutes PRN For ANY of the following changes in patient status:  A. RR LESS THAN 10 breaths per minute, B. Oxygen saturation LESS THAN 90%, C. Sedation score of 6  ondansetron Injectable 4 milliGRAM(s) IV Push every 6 hours PRN Nausea and/or Vomiting  oxyCODONE    IR 5 milliGRAM(s) Oral every 6 hours PRN Severe Pain (7 - 10)      Vital Signs Last 24 Hrs  T(C): 36.8 (28 Apr 2023 08:28), Max: 37.2 (27 Apr 2023 16:21)  T(F): 98.2 (28 Apr 2023 08:28), Max: 98.9 (27 Apr 2023 16:21)  HR: 78 (28 Apr 2023 08:28) (65 - 79)  BP: 126/78 (28 Apr 2023 08:28) (121/89 - 136/88)  BP(mean): --  RR: 18 (28 Apr 2023 08:28) (16 - 18)  SpO2: 93% (28 Apr 2023 08:28) (93% - 94%)    Parameters below as of 28 Apr 2023 08:28  Patient On (Oxygen Delivery Method): room air        PHYSICAL EXAM:  Constitutional: AAOx3, no acute distress  HEENT: NCAT, airway patent  Cardiovascular: RRR, pulses present bilaterally  Respiratory: nonlabored breathing  Gastrointestinal: abdomen soft, nontender, non distended, no rebound or guarding, no palpable masses, incisions are clean, dry and intact without any surrounding erythema, drainage, bleeding or signs of infection   Neuro: no focal deficits  Extremities: non edematous, no calf pain bilaterally    I&O's Detail    27 Apr 2023 07:01  -  28 Apr 2023 07:00  --------------------------------------------------------  IN:    Lactated Ringers: 320 mL  Total IN: 320 mL    OUT:    Voided (mL): 600 mL  Total OUT: 600 mL    Total NET: -280 mL          LABS:                        10.5   9.08  )-----------( 438      ( 28 Apr 2023 06:18 )             36.3     04-28    138  |  106  |  21  ----------------------------<  105<H>  3.4<L>   |  21<L>  |  1.15    Ca    9.3      28 Apr 2023 06:18  Phos  2.6     04-28  Mg     2.0     04-28

## 2023-04-29 LAB
ANION GAP SERPL CALC-SCNC: 11 MMOL/L — SIGNIFICANT CHANGE UP (ref 5–17)
BUN SERPL-MCNC: 27 MG/DL — HIGH (ref 7–23)
CALCIUM SERPL-MCNC: 9.3 MG/DL — SIGNIFICANT CHANGE UP (ref 8.4–10.5)
CHLORIDE SERPL-SCNC: 106 MMOL/L — SIGNIFICANT CHANGE UP (ref 96–108)
CO2 SERPL-SCNC: 21 MMOL/L — LOW (ref 22–31)
CREAT SERPL-MCNC: 1.13 MG/DL — SIGNIFICANT CHANGE UP (ref 0.5–1.3)
EGFR: 75 ML/MIN/1.73M2 — SIGNIFICANT CHANGE UP
GLUCOSE SERPL-MCNC: 102 MG/DL — HIGH (ref 70–99)
HCT VFR BLD CALC: 33.9 % — LOW (ref 39–50)
HGB BLD-MCNC: 9.9 G/DL — LOW (ref 13–17)
MAGNESIUM SERPL-MCNC: 2 MG/DL — SIGNIFICANT CHANGE UP (ref 1.6–2.6)
MCHC RBC-ENTMCNC: 21.7 PG — LOW (ref 27–34)
MCHC RBC-ENTMCNC: 29.2 GM/DL — LOW (ref 32–36)
MCV RBC AUTO: 74.3 FL — LOW (ref 80–100)
NRBC # BLD: 0 /100 WBCS — SIGNIFICANT CHANGE UP (ref 0–0)
PHOSPHATE SERPL-MCNC: 3.5 MG/DL — SIGNIFICANT CHANGE UP (ref 2.5–4.5)
PLATELET # BLD AUTO: 422 K/UL — HIGH (ref 150–400)
POTASSIUM SERPL-MCNC: 3.6 MMOL/L — SIGNIFICANT CHANGE UP (ref 3.5–5.3)
POTASSIUM SERPL-SCNC: 3.6 MMOL/L — SIGNIFICANT CHANGE UP (ref 3.5–5.3)
RBC # BLD: 4.56 M/UL — SIGNIFICANT CHANGE UP (ref 4.2–5.8)
RBC # FLD: 22.3 % — HIGH (ref 10.3–14.5)
SODIUM SERPL-SCNC: 138 MMOL/L — SIGNIFICANT CHANGE UP (ref 135–145)
WBC # BLD: 7.4 K/UL — SIGNIFICANT CHANGE UP (ref 3.8–10.5)
WBC # FLD AUTO: 7.4 K/UL — SIGNIFICANT CHANGE UP (ref 3.8–10.5)

## 2023-04-29 RX ORDER — POTASSIUM CHLORIDE 20 MEQ
20 PACKET (EA) ORAL ONCE
Refills: 0 | Status: COMPLETED | OUTPATIENT
Start: 2023-04-29 | End: 2023-04-29

## 2023-04-29 RX ORDER — SODIUM CHLORIDE 9 MG/ML
1000 INJECTION, SOLUTION INTRAVENOUS ONCE
Refills: 0 | Status: COMPLETED | OUTPATIENT
Start: 2023-04-29 | End: 2023-04-29

## 2023-04-29 RX ADMIN — HEPARIN SODIUM 5000 UNIT(S): 5000 INJECTION INTRAVENOUS; SUBCUTANEOUS at 05:39

## 2023-04-29 RX ADMIN — Medication 1000 MILLIGRAM(S): at 13:22

## 2023-04-29 RX ADMIN — Medication 145 MILLIGRAM(S): at 12:00

## 2023-04-29 RX ADMIN — Medication 20 MILLIEQUIVALENT(S): at 09:06

## 2023-04-29 RX ADMIN — SODIUM CHLORIDE 1000 MILLILITER(S): 9 INJECTION, SOLUTION INTRAVENOUS at 09:06

## 2023-04-29 RX ADMIN — ONDANSETRON 4 MILLIGRAM(S): 8 TABLET, FILM COATED ORAL at 06:15

## 2023-04-29 RX ADMIN — Medication 1000 MILLIGRAM(S): at 19:00

## 2023-04-29 RX ADMIN — HEPARIN SODIUM 5000 UNIT(S): 5000 INJECTION INTRAVENOUS; SUBCUTANEOUS at 21:06

## 2023-04-29 RX ADMIN — SODIUM CHLORIDE 120 MILLILITER(S): 9 INJECTION, SOLUTION INTRAVENOUS at 10:40

## 2023-04-29 RX ADMIN — PANTOPRAZOLE SODIUM 40 MILLIGRAM(S): 20 TABLET, DELAYED RELEASE ORAL at 05:40

## 2023-04-29 RX ADMIN — Medication 1000 MILLIGRAM(S): at 12:00

## 2023-04-29 RX ADMIN — Medication 1000 MILLIGRAM(S): at 06:45

## 2023-04-29 RX ADMIN — Medication 1000 MILLIGRAM(S): at 19:52

## 2023-04-29 RX ADMIN — SODIUM CHLORIDE 120 MILLILITER(S): 9 INJECTION, SOLUTION INTRAVENOUS at 19:01

## 2023-04-29 RX ADMIN — Medication 360 MILLIGRAM(S): at 05:39

## 2023-04-29 RX ADMIN — HEPARIN SODIUM 5000 UNIT(S): 5000 INJECTION INTRAVENOUS; SUBCUTANEOUS at 15:31

## 2023-04-29 RX ADMIN — Medication 1000 MILLIGRAM(S): at 05:45

## 2023-04-29 NOTE — PROGRESS NOTE ADULT - SUBJECTIVE AND OBJECTIVE BOX
Seen and examined  Stable  Frequent BM q 1-2 hours  decreased appettie  Afeb  abd- soft, nd/ mild inc tenderness  wbc 7    Stable post op-   Cont observation  Diet as tolerated.  Restart IV fluids for mild dehydration with frequent loose stools

## 2023-04-29 NOTE — PROGRESS NOTE ADULT - SUBJECTIVE AND OBJECTIVE BOX
INTERVAL HPI/OVERNIGHT EVENTS: slava LRD with improved nausea, -emesis, +f/bm (loose)     STATUS POST: Diagnostic Laparoscopy converted to open total colectomy    POST OPERATIVE DAY #: 4    SUBJECTIVE: Patient in AM seen bedside by chief resident. He notes multiple loose BMs which prevented him from getting adequate sleep. Due to the multiple loose BMs over the day yesterday he did not eat much but denies any nausea or episodes of emesis. He also did not ambulate as much as prior due to feeling worn out from the multiple BMs.       diltiazem    milliGRAM(s) Oral daily  heparin   Injectable 5000 Unit(s) SubCutaneous every 8 hours      Vital Signs Last 24 Hrs  T(C): 36.5 (29 Apr 2023 04:52), Max: 37.1 (28 Apr 2023 13:44)  T(F): 97.7 (29 Apr 2023 04:52), Max: 98.7 (28 Apr 2023 13:44)  HR: 81 (29 Apr 2023 04:52) (78 - 99)  BP: 132/90 (29 Apr 2023 04:52) (118/76 - 142/98)  BP(mean): --  RR: 16 (29 Apr 2023 04:52) (16 - 18)  SpO2: 91% (29 Apr 2023 04:52) (91% - 95%)    Parameters below as of 29 Apr 2023 04:52  Patient On (Oxygen Delivery Method): room air      I&O's Detail    28 Apr 2023 07:01  -  29 Apr 2023 07:00  --------------------------------------------------------  IN:    Lactated Ringers: 520 mL    Lactated Ringers Bolus: 100 mL    Oral Fluid: 900 mL  Total IN: 1520 mL    OUT:  Total OUT: 0 mL    Total NET: 1520 mL          General: NAD, resting comfortably in bed  C/V: NSR  Pulm: Nonlabored breathing, no respiratory distress  Abd: soft, NT/ND. Incision c/d/i  Extrem: WWP, no edema, SCDs in place        LABS:                        9.9    7.40  )-----------( 422      ( 29 Apr 2023 05:30 )             33.9     04-29    138  |  106  |  x   ----------------------------<  102<H>  3.6   |  x   |  1.13    Ca    9.3      29 Apr 2023 05:30  Phos  3.5     04-29  Mg     2.0     04-29            RADIOLOGY & ADDITIONAL STUDIES:

## 2023-04-30 ENCOUNTER — TRANSCRIPTION ENCOUNTER (OUTPATIENT)
Age: 58
End: 2023-04-30

## 2023-04-30 LAB
ANION GAP SERPL CALC-SCNC: 11 MMOL/L — SIGNIFICANT CHANGE UP (ref 5–17)
ANION GAP SERPL CALC-SCNC: 12 MMOL/L — SIGNIFICANT CHANGE UP (ref 5–17)
BUN SERPL-MCNC: 20 MG/DL — SIGNIFICANT CHANGE UP (ref 7–23)
BUN SERPL-MCNC: 26 MG/DL — HIGH (ref 7–23)
CALCIUM SERPL-MCNC: 8.9 MG/DL — SIGNIFICANT CHANGE UP (ref 8.4–10.5)
CALCIUM SERPL-MCNC: 9.3 MG/DL — SIGNIFICANT CHANGE UP (ref 8.4–10.5)
CHLORIDE SERPL-SCNC: 108 MMOL/L — SIGNIFICANT CHANGE UP (ref 96–108)
CHLORIDE SERPL-SCNC: 109 MMOL/L — HIGH (ref 96–108)
CO2 SERPL-SCNC: 19 MMOL/L — LOW (ref 22–31)
CO2 SERPL-SCNC: 20 MMOL/L — LOW (ref 22–31)
CREAT SERPL-MCNC: 0.96 MG/DL — SIGNIFICANT CHANGE UP (ref 0.5–1.3)
CREAT SERPL-MCNC: 0.97 MG/DL — SIGNIFICANT CHANGE UP (ref 0.5–1.3)
EGFR: 90 ML/MIN/1.73M2 — SIGNIFICANT CHANGE UP
EGFR: 91 ML/MIN/1.73M2 — SIGNIFICANT CHANGE UP
GLUCOSE SERPL-MCNC: 136 MG/DL — HIGH (ref 70–99)
GLUCOSE SERPL-MCNC: 93 MG/DL — SIGNIFICANT CHANGE UP (ref 70–99)
HCT VFR BLD CALC: 29.3 % — LOW (ref 39–50)
HCT VFR BLD CALC: 35 % — LOW (ref 39–50)
HGB BLD-MCNC: 10 G/DL — LOW (ref 13–17)
HGB BLD-MCNC: 8.9 G/DL — LOW (ref 13–17)
MAGNESIUM SERPL-MCNC: 1.7 MG/DL — SIGNIFICANT CHANGE UP (ref 1.6–2.6)
MCHC RBC-ENTMCNC: 21.7 PG — LOW (ref 27–34)
MCHC RBC-ENTMCNC: 22.3 PG — LOW (ref 27–34)
MCHC RBC-ENTMCNC: 28.6 GM/DL — LOW (ref 32–36)
MCHC RBC-ENTMCNC: 30.4 GM/DL — LOW (ref 32–36)
MCV RBC AUTO: 73.3 FL — LOW (ref 80–100)
MCV RBC AUTO: 76.1 FL — LOW (ref 80–100)
NRBC # BLD: 0 /100 WBCS — SIGNIFICANT CHANGE UP (ref 0–0)
NRBC # BLD: 0 /100 WBCS — SIGNIFICANT CHANGE UP (ref 0–0)
PHOSPHATE SERPL-MCNC: 3 MG/DL — SIGNIFICANT CHANGE UP (ref 2.5–4.5)
PLATELET # BLD AUTO: 349 K/UL — SIGNIFICANT CHANGE UP (ref 150–400)
PLATELET # BLD AUTO: 367 K/UL — SIGNIFICANT CHANGE UP (ref 150–400)
POTASSIUM SERPL-MCNC: 3.4 MMOL/L — LOW (ref 3.5–5.3)
POTASSIUM SERPL-MCNC: 4.1 MMOL/L — SIGNIFICANT CHANGE UP (ref 3.5–5.3)
POTASSIUM SERPL-SCNC: 3.4 MMOL/L — LOW (ref 3.5–5.3)
POTASSIUM SERPL-SCNC: 4.1 MMOL/L — SIGNIFICANT CHANGE UP (ref 3.5–5.3)
RBC # BLD: 4 M/UL — LOW (ref 4.2–5.8)
RBC # BLD: 4.6 M/UL — SIGNIFICANT CHANGE UP (ref 4.2–5.8)
RBC # FLD: 22.1 % — HIGH (ref 10.3–14.5)
RBC # FLD: 22.5 % — HIGH (ref 10.3–14.5)
SODIUM SERPL-SCNC: 139 MMOL/L — SIGNIFICANT CHANGE UP (ref 135–145)
SODIUM SERPL-SCNC: 140 MMOL/L — SIGNIFICANT CHANGE UP (ref 135–145)
WBC # BLD: 10.38 K/UL — SIGNIFICANT CHANGE UP (ref 3.8–10.5)
WBC # BLD: 9.16 K/UL — SIGNIFICANT CHANGE UP (ref 3.8–10.5)
WBC # FLD AUTO: 10.38 K/UL — SIGNIFICANT CHANGE UP (ref 3.8–10.5)
WBC # FLD AUTO: 9.16 K/UL — SIGNIFICANT CHANGE UP (ref 3.8–10.5)

## 2023-04-30 RX ORDER — DIPHENOXYLATE HCL/ATROPINE 2.5-.025MG
1 TABLET ORAL ONCE
Refills: 0 | Status: DISCONTINUED | OUTPATIENT
Start: 2023-04-30 | End: 2023-04-30

## 2023-04-30 RX ORDER — SODIUM CHLORIDE 9 MG/ML
1000 INJECTION INTRAMUSCULAR; INTRAVENOUS; SUBCUTANEOUS ONCE
Refills: 0 | Status: COMPLETED | OUTPATIENT
Start: 2023-04-30 | End: 2023-04-30

## 2023-04-30 RX ORDER — SODIUM CHLORIDE 9 MG/ML
1000 INJECTION, SOLUTION INTRAVENOUS ONCE
Refills: 0 | Status: COMPLETED | OUTPATIENT
Start: 2023-04-30 | End: 2023-04-30

## 2023-04-30 RX ADMIN — Medication 1 TABLET(S): at 18:03

## 2023-04-30 RX ADMIN — PANTOPRAZOLE SODIUM 40 MILLIGRAM(S): 20 TABLET, DELAYED RELEASE ORAL at 06:04

## 2023-04-30 RX ADMIN — Medication 1000 MILLIGRAM(S): at 18:03

## 2023-04-30 RX ADMIN — SODIUM CHLORIDE 1000 MILLILITER(S): 9 INJECTION INTRAMUSCULAR; INTRAVENOUS; SUBCUTANEOUS at 10:30

## 2023-04-30 RX ADMIN — HEPARIN SODIUM 5000 UNIT(S): 5000 INJECTION INTRAVENOUS; SUBCUTANEOUS at 13:11

## 2023-04-30 RX ADMIN — Medication 1 TABLET(S): at 10:28

## 2023-04-30 RX ADMIN — Medication 1000 MILLIGRAM(S): at 01:02

## 2023-04-30 RX ADMIN — SODIUM CHLORIDE 1000 MILLILITER(S): 9 INJECTION INTRAMUSCULAR; INTRAVENOUS; SUBCUTANEOUS at 18:03

## 2023-04-30 RX ADMIN — SODIUM CHLORIDE 1000 MILLILITER(S): 9 INJECTION, SOLUTION INTRAVENOUS at 05:50

## 2023-04-30 RX ADMIN — HEPARIN SODIUM 5000 UNIT(S): 5000 INJECTION INTRAVENOUS; SUBCUTANEOUS at 21:14

## 2023-04-30 RX ADMIN — Medication 1000 MILLIGRAM(S): at 13:11

## 2023-04-30 RX ADMIN — Medication 1000 MILLIGRAM(S): at 06:03

## 2023-04-30 RX ADMIN — OXYCODONE HYDROCHLORIDE 5 MILLIGRAM(S): 5 TABLET ORAL at 05:52

## 2023-04-30 RX ADMIN — Medication 145 MILLIGRAM(S): at 13:11

## 2023-04-30 RX ADMIN — OXYCODONE HYDROCHLORIDE 5 MILLIGRAM(S): 5 TABLET ORAL at 04:52

## 2023-04-30 RX ADMIN — HEPARIN SODIUM 5000 UNIT(S): 5000 INJECTION INTRAVENOUS; SUBCUTANEOUS at 04:52

## 2023-04-30 RX ADMIN — Medication 360 MILLIGRAM(S): at 06:03

## 2023-04-30 RX ADMIN — SODIUM CHLORIDE 1000 MILLILITER(S): 9 INJECTION INTRAMUSCULAR; INTRAVENOUS; SUBCUTANEOUS at 14:13

## 2023-04-30 NOTE — PROGRESS NOTE ADULT - SUBJECTIVE AND OBJECTIVE BOX
STATUS POST: Diagnostic Laparoscopy converted to open total colectomy    POST OPERATIVE DAY #: 5    SUBJECTIVE: Patient in AM seen bedside by chief resident. Has been having multiple loose stools. Denies N/V.      diltiazem    milliGRAM(s) Oral daily  heparin   Injectable 5000 Unit(s) SubCutaneous every 8 hours    Vital Signs Last 24 Hrs  T(C): 37.2 (30 Apr 2023 09:39), Max: 37.2 (29 Apr 2023 20:30)  T(F): 99 (30 Apr 2023 09:39), Max: 99 (29 Apr 2023 20:30)  HR: 104 (30 Apr 2023 09:39) (76 - 104)  BP: 129/84 (30 Apr 2023 09:39) (129/84 - 143/102)  BP(mean): 96 (29 Apr 2023 20:30) (96 - 96)  RR: 16 (30 Apr 2023 09:39) (16 - 18)  SpO2: 94% (30 Apr 2023 09:39) (92% - 96%)    Parameters below as of 30 Apr 2023 09:39  Patient On (Oxygen Delivery Method): room air    I&O's Summary    29 Apr 2023 07:01  -  30 Apr 2023 07:00  --------------------------------------------------------  IN: 2880 mL / OUT: 450 mL / NET: 2430 mL    30 Apr 2023 07:01  -  30 Apr 2023 11:07  --------------------------------------------------------  IN: 240 mL / OUT: 0 mL / NET: 240 mL          General: NAD, resting comfortably in bed  C/V: NSR  Pulm: Nonlabored breathing, no respiratory distress  Abd: soft, NT/ND. Incision c/d/i  Extrem: WWP, no edema, SCDs in place        LABS:                          10.0   10.38 )-----------( 367      ( 30 Apr 2023 07:14 )             35.0     04-30    139  |  108  |  26<H>  ----------------------------<  93  4.1   |  19<L>  |  0.97    Ca    9.3      30 Apr 2023 07:14  Phos  3.0     04-30  Mg     1.7     04-30                      RADIOLOGY & ADDITIONAL STUDIES:

## 2023-05-01 ENCOUNTER — TRANSCRIPTION ENCOUNTER (OUTPATIENT)
Age: 58
End: 2023-05-01

## 2023-05-01 LAB
ALBUMIN SERPL ELPH-MCNC: 3 G/DL — LOW (ref 3.3–5)
ALP SERPL-CCNC: 78 U/L — SIGNIFICANT CHANGE UP (ref 40–120)
ALT FLD-CCNC: 10 U/L — SIGNIFICANT CHANGE UP (ref 10–45)
AMYLASE FLD-CCNC: >7500 U/L — SIGNIFICANT CHANGE UP
ANION GAP SERPL CALC-SCNC: 12 MMOL/L — SIGNIFICANT CHANGE UP (ref 5–17)
ANION GAP SERPL CALC-SCNC: 9 MMOL/L — SIGNIFICANT CHANGE UP (ref 5–17)
APTT BLD: 30.6 SEC — SIGNIFICANT CHANGE UP (ref 27.5–35.5)
AST SERPL-CCNC: 12 U/L — SIGNIFICANT CHANGE UP (ref 10–40)
BILIRUB SERPL-MCNC: 0.5 MG/DL — SIGNIFICANT CHANGE UP (ref 0.2–1.2)
BLD GP AB SCN SERPL QL: NEGATIVE — SIGNIFICANT CHANGE UP
BUN SERPL-MCNC: 12 MG/DL — SIGNIFICANT CHANGE UP (ref 7–23)
BUN SERPL-MCNC: 15 MG/DL — SIGNIFICANT CHANGE UP (ref 7–23)
CALCIUM SERPL-MCNC: 8.2 MG/DL — LOW (ref 8.4–10.5)
CALCIUM SERPL-MCNC: 9.2 MG/DL — SIGNIFICANT CHANGE UP (ref 8.4–10.5)
CHLORIDE SERPL-SCNC: 105 MMOL/L — SIGNIFICANT CHANGE UP (ref 96–108)
CHLORIDE SERPL-SCNC: 107 MMOL/L — SIGNIFICANT CHANGE UP (ref 96–108)
CO2 SERPL-SCNC: 19 MMOL/L — LOW (ref 22–31)
CO2 SERPL-SCNC: 20 MMOL/L — LOW (ref 22–31)
CREAT SERPL-MCNC: 0.85 MG/DL — SIGNIFICANT CHANGE UP (ref 0.5–1.3)
CREAT SERPL-MCNC: 1.19 MG/DL — SIGNIFICANT CHANGE UP (ref 0.5–1.3)
EGFR: 100 ML/MIN/1.73M2 — SIGNIFICANT CHANGE UP
EGFR: 70 ML/MIN/1.73M2 — SIGNIFICANT CHANGE UP
GLUCOSE SERPL-MCNC: 103 MG/DL — HIGH (ref 70–99)
GLUCOSE SERPL-MCNC: 96 MG/DL — SIGNIFICANT CHANGE UP (ref 70–99)
GRAM STN FLD: SIGNIFICANT CHANGE UP
HCT VFR BLD CALC: 31.5 % — LOW (ref 39–50)
HCT VFR BLD CALC: 36.9 % — LOW (ref 39–50)
HGB BLD-MCNC: 11 G/DL — LOW (ref 13–17)
HGB BLD-MCNC: 9.1 G/DL — LOW (ref 13–17)
MAGNESIUM SERPL-MCNC: 1.6 MG/DL — SIGNIFICANT CHANGE UP (ref 1.6–2.6)
MAGNESIUM SERPL-MCNC: 2 MG/DL — SIGNIFICANT CHANGE UP (ref 1.6–2.6)
MCHC RBC-ENTMCNC: 21.5 PG — LOW (ref 27–34)
MCHC RBC-ENTMCNC: 21.5 PG — LOW (ref 27–34)
MCHC RBC-ENTMCNC: 28.9 GM/DL — LOW (ref 32–36)
MCHC RBC-ENTMCNC: 29.8 GM/DL — LOW (ref 32–36)
MCV RBC AUTO: 72.2 FL — LOW (ref 80–100)
MCV RBC AUTO: 74.3 FL — LOW (ref 80–100)
NRBC # BLD: 0 /100 WBCS — SIGNIFICANT CHANGE UP (ref 0–0)
NRBC # BLD: 0 /100 WBCS — SIGNIFICANT CHANGE UP (ref 0–0)
PHOSPHATE SERPL-MCNC: 2.3 MG/DL — LOW (ref 2.5–4.5)
PHOSPHATE SERPL-MCNC: 3.8 MG/DL — SIGNIFICANT CHANGE UP (ref 2.5–4.5)
PLATELET # BLD AUTO: 382 K/UL — SIGNIFICANT CHANGE UP (ref 150–400)
PLATELET # BLD AUTO: 506 K/UL — HIGH (ref 150–400)
POTASSIUM SERPL-MCNC: 3.7 MMOL/L — SIGNIFICANT CHANGE UP (ref 3.5–5.3)
POTASSIUM SERPL-MCNC: 3.7 MMOL/L — SIGNIFICANT CHANGE UP (ref 3.5–5.3)
POTASSIUM SERPL-SCNC: 3.7 MMOL/L — SIGNIFICANT CHANGE UP (ref 3.5–5.3)
POTASSIUM SERPL-SCNC: 3.7 MMOL/L — SIGNIFICANT CHANGE UP (ref 3.5–5.3)
PROT SERPL-MCNC: 6.2 G/DL — SIGNIFICANT CHANGE UP (ref 6–8.3)
RBC # BLD: 4.24 M/UL — SIGNIFICANT CHANGE UP (ref 4.2–5.8)
RBC # BLD: 5.11 M/UL — SIGNIFICANT CHANGE UP (ref 4.2–5.8)
RBC # FLD: 21.6 % — HIGH (ref 10.3–14.5)
RBC # FLD: 21.8 % — HIGH (ref 10.3–14.5)
RH IG SCN BLD-IMP: POSITIVE — SIGNIFICANT CHANGE UP
SODIUM SERPL-SCNC: 136 MMOL/L — SIGNIFICANT CHANGE UP (ref 135–145)
SODIUM SERPL-SCNC: 136 MMOL/L — SIGNIFICANT CHANGE UP (ref 135–145)
SPECIMEN SOURCE FLD: SIGNIFICANT CHANGE UP
SPECIMEN SOURCE: SIGNIFICANT CHANGE UP
WBC # BLD: 13.85 K/UL — HIGH (ref 3.8–10.5)
WBC # BLD: 9.29 K/UL — SIGNIFICANT CHANGE UP (ref 3.8–10.5)
WBC # FLD AUTO: 13.85 K/UL — HIGH (ref 3.8–10.5)
WBC # FLD AUTO: 9.29 K/UL — SIGNIFICANT CHANGE UP (ref 3.8–10.5)

## 2023-05-01 PROCEDURE — 49406 IMAGE CATH FLUID PERI/RETRO: CPT

## 2023-05-01 PROCEDURE — 44310 ILEOSTOMY/JEJUNOSTOMY: CPT | Mod: 78

## 2023-05-01 PROCEDURE — 74177 CT ABD & PELVIS W/CONTRAST: CPT | Mod: 26

## 2023-05-01 PROCEDURE — 74176 CT ABD & PELVIS W/O CONTRAST: CPT | Mod: 26,59

## 2023-05-01 RX ORDER — POTASSIUM CHLORIDE 20 MEQ
10 PACKET (EA) ORAL
Refills: 0 | Status: COMPLETED | OUTPATIENT
Start: 2023-05-01 | End: 2023-05-01

## 2023-05-01 RX ORDER — SODIUM CHLORIDE 9 MG/ML
1000 INJECTION, SOLUTION INTRAVENOUS ONCE
Refills: 0 | Status: COMPLETED | OUTPATIENT
Start: 2023-05-01 | End: 2023-05-01

## 2023-05-01 RX ORDER — ACETAMINOPHEN 500 MG
1000 TABLET ORAL ONCE
Refills: 0 | Status: COMPLETED | OUTPATIENT
Start: 2023-05-02 | End: 2023-05-02

## 2023-05-01 RX ORDER — PIPERACILLIN AND TAZOBACTAM 4; .5 G/20ML; G/20ML
3.38 INJECTION, POWDER, LYOPHILIZED, FOR SOLUTION INTRAVENOUS ONCE
Refills: 0 | Status: COMPLETED | OUTPATIENT
Start: 2023-05-01 | End: 2023-05-01

## 2023-05-01 RX ORDER — HYDROMORPHONE HYDROCHLORIDE 2 MG/ML
0.5 INJECTION INTRAMUSCULAR; INTRAVENOUS; SUBCUTANEOUS ONCE
Refills: 0 | Status: DISCONTINUED | OUTPATIENT
Start: 2023-05-01 | End: 2023-05-01

## 2023-05-01 RX ORDER — OXYCODONE HYDROCHLORIDE 5 MG/1
5 TABLET ORAL EVERY 6 HOURS
Refills: 0 | Status: DISCONTINUED | OUTPATIENT
Start: 2023-05-01 | End: 2023-05-01

## 2023-05-01 RX ORDER — ACETAMINOPHEN 500 MG
1000 TABLET ORAL ONCE
Refills: 0 | Status: COMPLETED | OUTPATIENT
Start: 2023-05-01 | End: 2023-05-01

## 2023-05-01 RX ORDER — POTASSIUM CHLORIDE 20 MEQ
40 PACKET (EA) ORAL ONCE
Refills: 0 | Status: DISCONTINUED | OUTPATIENT
Start: 2023-05-01 | End: 2023-05-01

## 2023-05-01 RX ORDER — DIATRIZOATE MEGLUMINE 180 MG/ML
30 INJECTION, SOLUTION INTRAVESICAL ONCE
Refills: 0 | Status: COMPLETED | OUTPATIENT
Start: 2023-05-01 | End: 2023-05-01

## 2023-05-01 RX ORDER — ACETAMINOPHEN 500 MG
1000 TABLET ORAL ONCE
Refills: 0 | Status: DISCONTINUED | OUTPATIENT
Start: 2023-05-01 | End: 2023-05-01

## 2023-05-01 RX ORDER — DIPHENOXYLATE HCL/ATROPINE 2.5-.025MG
1 TABLET ORAL
Refills: 0 | Status: DISCONTINUED | OUTPATIENT
Start: 2023-05-01 | End: 2023-05-01

## 2023-05-01 RX ORDER — PIPERACILLIN AND TAZOBACTAM 4; .5 G/20ML; G/20ML
3.38 INJECTION, POWDER, LYOPHILIZED, FOR SOLUTION INTRAVENOUS EVERY 8 HOURS
Refills: 0 | Status: DISCONTINUED | OUTPATIENT
Start: 2023-05-02 | End: 2023-05-06

## 2023-05-01 RX ORDER — HYDROMORPHONE HYDROCHLORIDE 2 MG/ML
0.25 INJECTION INTRAMUSCULAR; INTRAVENOUS; SUBCUTANEOUS EVERY 4 HOURS
Refills: 0 | Status: DISCONTINUED | OUTPATIENT
Start: 2023-05-01 | End: 2023-05-05

## 2023-05-01 RX ORDER — POTASSIUM CHLORIDE 20 MEQ
40 PACKET (EA) ORAL ONCE
Refills: 0 | Status: COMPLETED | OUTPATIENT
Start: 2023-05-01 | End: 2023-05-01

## 2023-05-01 RX ORDER — HYDROMORPHONE HYDROCHLORIDE 2 MG/ML
0.25 INJECTION INTRAMUSCULAR; INTRAVENOUS; SUBCUTANEOUS ONCE
Refills: 0 | Status: DISCONTINUED | OUTPATIENT
Start: 2023-05-01 | End: 2023-05-01

## 2023-05-01 RX ORDER — MAGNESIUM SULFATE 500 MG/ML
2 VIAL (ML) INJECTION ONCE
Refills: 0 | Status: COMPLETED | OUTPATIENT
Start: 2023-05-01 | End: 2023-05-01

## 2023-05-01 RX ORDER — HYDROMORPHONE HYDROCHLORIDE 2 MG/ML
0.5 INJECTION INTRAMUSCULAR; INTRAVENOUS; SUBCUTANEOUS EVERY 4 HOURS
Refills: 0 | Status: DISCONTINUED | OUTPATIENT
Start: 2023-05-01 | End: 2023-05-05

## 2023-05-01 RX ORDER — OXYCODONE HYDROCHLORIDE 5 MG/1
10 TABLET ORAL EVERY 6 HOURS
Refills: 0 | Status: DISCONTINUED | OUTPATIENT
Start: 2023-05-01 | End: 2023-05-01

## 2023-05-01 RX ORDER — POTASSIUM PHOSPHATE, MONOBASIC POTASSIUM PHOSPHATE, DIBASIC 236; 224 MG/ML; MG/ML
15 INJECTION, SOLUTION INTRAVENOUS ONCE
Refills: 0 | Status: COMPLETED | OUTPATIENT
Start: 2023-05-01 | End: 2023-05-01

## 2023-05-01 RX ADMIN — HYDROMORPHONE HYDROCHLORIDE 0.5 MILLIGRAM(S): 2 INJECTION INTRAMUSCULAR; INTRAVENOUS; SUBCUTANEOUS at 12:47

## 2023-05-01 RX ADMIN — HYDROMORPHONE HYDROCHLORIDE 0.5 MILLIGRAM(S): 2 INJECTION INTRAMUSCULAR; INTRAVENOUS; SUBCUTANEOUS at 12:32

## 2023-05-01 RX ADMIN — HEPARIN SODIUM 5000 UNIT(S): 5000 INJECTION INTRAVENOUS; SUBCUTANEOUS at 05:46

## 2023-05-01 RX ADMIN — SODIUM CHLORIDE 1000 MILLILITER(S): 9 INJECTION, SOLUTION INTRAVENOUS at 18:50

## 2023-05-01 RX ADMIN — POTASSIUM PHOSPHATE, MONOBASIC POTASSIUM PHOSPHATE, DIBASIC 62.5 MILLIMOLE(S): 236; 224 INJECTION, SOLUTION INTRAVENOUS at 10:43

## 2023-05-01 RX ADMIN — HEPARIN SODIUM 5000 UNIT(S): 5000 INJECTION INTRAVENOUS; SUBCUTANEOUS at 13:00

## 2023-05-01 RX ADMIN — Medication 100 MILLIEQUIVALENT(S): at 00:56

## 2023-05-01 RX ADMIN — Medication 1000 MILLIGRAM(S): at 00:08

## 2023-05-01 RX ADMIN — HYDROMORPHONE HYDROCHLORIDE 0.25 MILLIGRAM(S): 2 INJECTION INTRAMUSCULAR; INTRAVENOUS; SUBCUTANEOUS at 05:45

## 2023-05-01 RX ADMIN — Medication 25 GRAM(S): at 09:33

## 2023-05-01 RX ADMIN — OXYCODONE HYDROCHLORIDE 5 MILLIGRAM(S): 5 TABLET ORAL at 10:43

## 2023-05-01 RX ADMIN — HYDROMORPHONE HYDROCHLORIDE 0.25 MILLIGRAM(S): 2 INJECTION INTRAMUSCULAR; INTRAVENOUS; SUBCUTANEOUS at 14:11

## 2023-05-01 RX ADMIN — Medication 100 MILLIEQUIVALENT(S): at 04:17

## 2023-05-01 RX ADMIN — Medication 1000 MILLIGRAM(S): at 12:00

## 2023-05-01 RX ADMIN — PIPERACILLIN AND TAZOBACTAM 200 GRAM(S): 4; .5 INJECTION, POWDER, LYOPHILIZED, FOR SOLUTION INTRAVENOUS at 16:16

## 2023-05-01 RX ADMIN — Medication 1000 MILLIGRAM(S): at 06:27

## 2023-05-01 RX ADMIN — Medication 100 MILLIEQUIVALENT(S): at 05:46

## 2023-05-01 RX ADMIN — Medication 100 MILLIEQUIVALENT(S): at 02:57

## 2023-05-01 RX ADMIN — SODIUM CHLORIDE 1000 MILLILITER(S): 9 INJECTION, SOLUTION INTRAVENOUS at 17:15

## 2023-05-01 RX ADMIN — OXYCODONE HYDROCHLORIDE 5 MILLIGRAM(S): 5 TABLET ORAL at 05:25

## 2023-05-01 RX ADMIN — Medication 360 MILLIGRAM(S): at 06:27

## 2023-05-01 RX ADMIN — PANTOPRAZOLE SODIUM 40 MILLIGRAM(S): 20 TABLET, DELAYED RELEASE ORAL at 06:27

## 2023-05-01 RX ADMIN — Medication 400 MILLIGRAM(S): at 17:15

## 2023-05-01 RX ADMIN — HYDROMORPHONE HYDROCHLORIDE 0.25 MILLIGRAM(S): 2 INJECTION INTRAMUSCULAR; INTRAVENOUS; SUBCUTANEOUS at 06:00

## 2023-05-01 RX ADMIN — SODIUM CHLORIDE 1000 MILLILITER(S): 9 INJECTION, SOLUTION INTRAVENOUS at 21:19

## 2023-05-01 RX ADMIN — SODIUM CHLORIDE 150 MILLILITER(S): 9 INJECTION, SOLUTION INTRAVENOUS at 21:18

## 2023-05-01 RX ADMIN — Medication 1000 MILLIGRAM(S): at 17:45

## 2023-05-01 RX ADMIN — DIATRIZOATE MEGLUMINE 30 MILLILITER(S): 180 INJECTION, SOLUTION INTRAVESICAL at 08:47

## 2023-05-01 RX ADMIN — PIPERACILLIN AND TAZOBACTAM 25 GRAM(S): 4; .5 INJECTION, POWDER, LYOPHILIZED, FOR SOLUTION INTRAVENOUS at 21:02

## 2023-05-01 RX ADMIN — Medication 100 MILLIEQUIVALENT(S): at 17:22

## 2023-05-01 RX ADMIN — OXYCODONE HYDROCHLORIDE 5 MILLIGRAM(S): 5 TABLET ORAL at 04:25

## 2023-05-01 RX ADMIN — Medication 100 MILLIEQUIVALENT(S): at 01:55

## 2023-05-01 RX ADMIN — Medication 145 MILLIGRAM(S): at 12:00

## 2023-05-01 RX ADMIN — SODIUM CHLORIDE 150 MILLILITER(S): 9 INJECTION, SOLUTION INTRAVENOUS at 15:07

## 2023-05-01 RX ADMIN — OXYCODONE HYDROCHLORIDE 5 MILLIGRAM(S): 5 TABLET ORAL at 11:43

## 2023-05-01 RX ADMIN — Medication 100 MILLIEQUIVALENT(S): at 21:02

## 2023-05-01 RX ADMIN — HYDROMORPHONE HYDROCHLORIDE 0.25 MILLIGRAM(S): 2 INJECTION INTRAMUSCULAR; INTRAVENOUS; SUBCUTANEOUS at 14:25

## 2023-05-01 NOTE — PRE-ANESTHESIA EVALUATION ADULT - NSPROPOSEDPROCEDFT_GEN_ALL_CORE
Laparoscopic Robotic Total Colectomy w/ Ileostomy Creation, Possible Open
ex lap, wash out and ileostomy creation

## 2023-05-01 NOTE — DIETITIAN INITIAL EVALUATION ADULT - PERTINENT LABORATORY DATA
05-01    136  |  107  |  12  ----------------------------<  96  3.7   |  20<L>  |  0.85    Ca    8.2<L>      01 May 2023 07:57  Phos  2.3     05-01  Mg     1.6     05-01    TPro  6.2  /  Alb  3.0<L>  /  TBili  0.5  /  DBili  x   /  AST  12  /  ALT  10  /  AlkPhos  78  05-01

## 2023-05-01 NOTE — PROGRESS NOTE ADULT - SUBJECTIVE AND OBJECTIVE BOX
INTERVAL HPI/OVERNIGHT EVENTS: slava diet, -n/-v, +F/+loose, green, small BM, repleted K, small formed stool x1, increased abd pain 10/10 - gas pain, dilaudid .25x1    STATUS POST: Dx laparoscopy converted to open total colectomy    POST OPERATIVE DAY #: 6    SUBJECTIVE: Patient seen this AM by team and chief resident. Notes continued small loose BMs although more formed. -N/-V. Tolerating PO diet although appetite reduced due to diarrhea. OOBA.      diltiazem    milliGRAM(s) Oral daily  heparin   Injectable 5000 Unit(s) SubCutaneous every 8 hours      Vital Signs Last 24 Hrs  T(C): 36.9 (01 May 2023 05:00), Max: 37.8 (30 Apr 2023 14:12)  T(F): 98.5 (01 May 2023 05:00), Max: 100.1 (30 Apr 2023 14:12)  HR: 99 (01 May 2023 06:31) (84 - 104)  BP: 139/91 (01 May 2023 06:31) (118/76 - 169/149)  BP(mean): 154 (01 May 2023 05:00) (154 - 154)  RR: 18 (01 May 2023 05:00) (16 - 18)  SpO2: 92% (01 May 2023 05:00) (92% - 95%)    Parameters below as of 01 May 2023 05:00  Patient On (Oxygen Delivery Method): room air      I&O's Detail    30 Apr 2023 07:01  -  01 May 2023 07:00  --------------------------------------------------------  IN:    IV PiggyBack: 500 mL    Lactated Ringers: 2520 mL    Sodium Chloride 0.9% Bolus: 3000 mL  Total IN: 6020 mL    OUT:    Voided (mL): 300 mL  Total OUT: 300 mL    Total NET: 5720 mL          General: NAD, resting comfortably in bed  C/V: NSR  Pulm: Nonlabored breathing, no respiratory distress  Abd: soft, NT/mildly distended. Incision c/d/i  Extrem: WWP, no edema, SCDs in place      LABS:                        9.1    9.29  )-----------( 382      ( 01 May 2023 07:57 )             31.5     05-01    136  |  107  |  x   ----------------------------<  x   3.7   |  x   |  x     Ca    8.9      30 Apr 2023 17:06  Phos  3.0     04-30  Mg     1.6     05-01            RADIOLOGY & ADDITIONAL STUDIES:

## 2023-05-01 NOTE — DIETITIAN INITIAL EVALUATION ADULT - PERTINENT MEDS FT
MEDICATIONS  (STANDING):  acetaminophen     Tablet .. 1000 milliGRAM(s) Oral every 6 hours  diltiazem    milliGRAM(s) Oral daily  diphenoxylate/atropine 1 Tablet(s) Oral two times a day  fenofibrate Tablet 145 milliGRAM(s) Oral daily  heparin   Injectable 5000 Unit(s) SubCutaneous every 8 hours  lactated ringers. 1000 milliLiter(s) (120 mL/Hr) IV Continuous <Continuous>  pantoprazole    Tablet 40 milliGRAM(s) Oral before breakfast    MEDICATIONS  (PRN):  naloxone Injectable 0.1 milliGRAM(s) IV Push every 3 minutes PRN For ANY of the following changes in patient status:  A. RR LESS THAN 10 breaths per minute, B. Oxygen saturation LESS THAN 90%, C. Sedation score of 6  ondansetron Injectable 4 milliGRAM(s) IV Push every 6 hours PRN Nausea and/or Vomiting  oxyCODONE    IR 5 milliGRAM(s) Oral every 6 hours PRN Moderate Pain (4 - 6)  oxyCODONE    IR 10 milliGRAM(s) Oral every 6 hours PRN Severe Pain (7 - 10)

## 2023-05-01 NOTE — DIETITIAN INITIAL EVALUATION ADULT - OTHER INFO
57M with PMH of HTN, HLD, JOSE (no CPAP), diverticulitis presents for colon resection now s/p dx lap converted to open total colectomy 4/25.    Pt seen in room for nutrition assessment. Pt was seen last week for nutrition education on Low Fiber Diet. Now seen for full nutrition assessment. Pt reports fair appetite PTA and during hospital stay. As per diet recall PTA: pt endorsed he ate most foods, meat, fish, vegetables, fruits, grains, ate several meals per day. Currently NPO status for test/procedure, pt was previously on a Low Fiber + Kosher diet, noted with </=50% PO intakes overall, pt noted with a distaste for hospital food during his stay. Pt endorsed his wife is bringing him food from home. Noted with Kosher preferences. NKFA. Denies wt changes, reports wt stability at current wt. Dosing wt: 202.5#, IBW: 196#, pt is 103% of IBW. Denies N/V, pt's loose stools have improved per pt's RN and per EMR/chart data, last BM on 05/01/23. No edema. Skin: abdominal surgical incision, R thigh wound. Nabor: 21. No issues chewing or swallowing noted. Noted with pain relief (from 7/8 level of pain to level 0). Labs reviewed: low Phos (2.3); RD to continue to monitor trends. Nutritionally pertinent medications/supplements: IVF (LR), zofran. RD observed pt with no overt signs of muscle or fat wasting. Based on ASPEN guidelines, pt does not meet criteria for malnutrition at this time. Pt amenable to education; RD provided education in regards to the importance of adequate macro and micronutrients, as well as hydration to support ADLs, maintain energy levels and overall functional/nutritional status. General healthful education provided. Low fiber diet education reinforced/provided to pt. Pt was receptive and verbalized understanding. No additional nutrition-related concerns. Will continue to follow per RD protocol. Additional nutrition recommendations below to follow.

## 2023-05-01 NOTE — DIETITIAN INITIAL EVALUATION ADULT - NS FNS DIET ORDER
Diet, NPO:   NPO for Procedure/Test     NPO Start Date: 01-May-2023,   NPO Start Time: 08:12 (05-01-23 @ 08:11)

## 2023-05-01 NOTE — DIETITIAN INITIAL EVALUATION ADULT - ADD RECOMMEND
1. NPO status  >>after procedure, recommend to continue Low Fiber diet  >>consider ONS and/or nourishments for additional nutrients per pt's preference *add yogurt BID and follow up per preferences*  2. Encourage pt to meet nutritional needs as able   3. Monitor PO intakes, trend weights (weekly), monitor skin integrity, monitor labs (electrolytes, CMP), monitor GI fxn   4. Encourage adherence to diet education (reinforce as able)   5. Pain and bowel regimen per team   6. Will continue to assess/honor preferences as able   7. Align nutrition interventions with GOC at all times

## 2023-05-01 NOTE — DIETITIAN INITIAL EVALUATION ADULT - PERSON TAUGHT/METHOD
education; RD provided education in regards to the importance of adequate macro and micronutrients, as well as hydration to support ADLs, maintain energy levels and overall functional/nutritional status. General healthful education provided. Low fiber diet education reinforced/provided to pt. Pt was receptive and verbalized understanding./verbal instruction/patient instructed

## 2023-05-02 LAB
ANION GAP SERPL CALC-SCNC: 10 MMOL/L — SIGNIFICANT CHANGE UP (ref 5–17)
BILIRUB FLD-MCNC: 1 MG/DL — SIGNIFICANT CHANGE UP
BUN SERPL-MCNC: 19 MG/DL — SIGNIFICANT CHANGE UP (ref 7–23)
CALCIUM SERPL-MCNC: 8.3 MG/DL — LOW (ref 8.4–10.5)
CHLORIDE SERPL-SCNC: 106 MMOL/L — SIGNIFICANT CHANGE UP (ref 96–108)
CO2 SERPL-SCNC: 22 MMOL/L — SIGNIFICANT CHANGE UP (ref 22–31)
CREAT SERPL-MCNC: 0.96 MG/DL — SIGNIFICANT CHANGE UP (ref 0.5–1.3)
EGFR: 91 ML/MIN/1.73M2 — SIGNIFICANT CHANGE UP
GLUCOSE SERPL-MCNC: 121 MG/DL — HIGH (ref 70–99)
HCT VFR BLD CALC: 30.6 % — LOW (ref 39–50)
HGB BLD-MCNC: 9 G/DL — LOW (ref 13–17)
MAGNESIUM SERPL-MCNC: 2 MG/DL — SIGNIFICANT CHANGE UP (ref 1.6–2.6)
MCHC RBC-ENTMCNC: 22 PG — LOW (ref 27–34)
MCHC RBC-ENTMCNC: 29.4 GM/DL — LOW (ref 32–36)
MCV RBC AUTO: 74.8 FL — LOW (ref 80–100)
NRBC # BLD: 0 /100 WBCS — SIGNIFICANT CHANGE UP (ref 0–0)
PHOSPHATE SERPL-MCNC: 3.3 MG/DL — SIGNIFICANT CHANGE UP (ref 2.5–4.5)
PLATELET # BLD AUTO: 418 K/UL — HIGH (ref 150–400)
POTASSIUM SERPL-MCNC: 4.6 MMOL/L — SIGNIFICANT CHANGE UP (ref 3.5–5.3)
POTASSIUM SERPL-SCNC: 4.6 MMOL/L — SIGNIFICANT CHANGE UP (ref 3.5–5.3)
RBC # BLD: 4.09 M/UL — LOW (ref 4.2–5.8)
RBC # FLD: 22.3 % — HIGH (ref 10.3–14.5)
SODIUM SERPL-SCNC: 138 MMOL/L — SIGNIFICANT CHANGE UP (ref 135–145)
WBC # BLD: 16.34 K/UL — HIGH (ref 3.8–10.5)
WBC # FLD AUTO: 16.34 K/UL — HIGH (ref 3.8–10.5)

## 2023-05-02 PROCEDURE — 71045 X-RAY EXAM CHEST 1 VIEW: CPT | Mod: 26,76

## 2023-05-02 RX ORDER — SODIUM CHLORIDE 9 MG/ML
1000 INJECTION, SOLUTION INTRAVENOUS ONCE
Refills: 0 | Status: COMPLETED | OUTPATIENT
Start: 2023-05-02 | End: 2023-05-02

## 2023-05-02 RX ORDER — PANTOPRAZOLE SODIUM 20 MG/1
40 TABLET, DELAYED RELEASE ORAL DAILY
Refills: 0 | Status: DISCONTINUED | OUTPATIENT
Start: 2023-05-02 | End: 2023-05-09

## 2023-05-02 RX ORDER — ACETAMINOPHEN 500 MG
1000 TABLET ORAL ONCE
Refills: 0 | Status: COMPLETED | OUTPATIENT
Start: 2023-05-02 | End: 2023-05-02

## 2023-05-02 RX ORDER — BENZOCAINE 10 %
1 GEL (GRAM) MUCOUS MEMBRANE ONCE
Refills: 0 | Status: COMPLETED | OUTPATIENT
Start: 2023-05-02 | End: 2023-05-02

## 2023-05-02 RX ORDER — SODIUM CHLORIDE 9 MG/ML
1000 INJECTION, SOLUTION INTRAVENOUS
Refills: 0 | Status: DISCONTINUED | OUTPATIENT
Start: 2023-05-02 | End: 2023-05-04

## 2023-05-02 RX ORDER — BENZOCAINE AND MENTHOL 5; 1 G/100ML; G/100ML
1 LIQUID ORAL ONCE
Refills: 0 | Status: COMPLETED | OUTPATIENT
Start: 2023-05-02 | End: 2023-05-02

## 2023-05-02 RX ORDER — HEPARIN SODIUM 5000 [USP'U]/ML
5000 INJECTION INTRAVENOUS; SUBCUTANEOUS EVERY 8 HOURS
Refills: 0 | Status: DISCONTINUED | OUTPATIENT
Start: 2023-05-02 | End: 2023-05-09

## 2023-05-02 RX ADMIN — PIPERACILLIN AND TAZOBACTAM 25 GRAM(S): 4; .5 INJECTION, POWDER, LYOPHILIZED, FOR SOLUTION INTRAVENOUS at 13:20

## 2023-05-02 RX ADMIN — Medication 1 SPRAY(S): at 22:47

## 2023-05-02 RX ADMIN — Medication 400 MILLIGRAM(S): at 11:40

## 2023-05-02 RX ADMIN — HYDROMORPHONE HYDROCHLORIDE 0.25 MILLIGRAM(S): 2 INJECTION INTRAMUSCULAR; INTRAVENOUS; SUBCUTANEOUS at 02:10

## 2023-05-02 RX ADMIN — HEPARIN SODIUM 5000 UNIT(S): 5000 INJECTION INTRAVENOUS; SUBCUTANEOUS at 21:20

## 2023-05-02 RX ADMIN — Medication 400 MILLIGRAM(S): at 05:13

## 2023-05-02 RX ADMIN — PIPERACILLIN AND TAZOBACTAM 25 GRAM(S): 4; .5 INJECTION, POWDER, LYOPHILIZED, FOR SOLUTION INTRAVENOUS at 21:20

## 2023-05-02 RX ADMIN — BENZOCAINE AND MENTHOL 1 LOZENGE: 5; 1 LIQUID ORAL at 19:46

## 2023-05-02 RX ADMIN — Medication 1000 MILLIGRAM(S): at 22:00

## 2023-05-02 RX ADMIN — SODIUM CHLORIDE 150 MILLILITER(S): 9 INJECTION, SOLUTION INTRAVENOUS at 05:11

## 2023-05-02 RX ADMIN — Medication 145 MILLIGRAM(S): at 11:39

## 2023-05-02 RX ADMIN — PIPERACILLIN AND TAZOBACTAM 25 GRAM(S): 4; .5 INJECTION, POWDER, LYOPHILIZED, FOR SOLUTION INTRAVENOUS at 05:12

## 2023-05-02 RX ADMIN — HEPARIN SODIUM 5000 UNIT(S): 5000 INJECTION INTRAVENOUS; SUBCUTANEOUS at 13:20

## 2023-05-02 RX ADMIN — HEPARIN SODIUM 5000 UNIT(S): 5000 INJECTION INTRAVENOUS; SUBCUTANEOUS at 05:18

## 2023-05-02 RX ADMIN — PANTOPRAZOLE SODIUM 40 MILLIGRAM(S): 20 TABLET, DELAYED RELEASE ORAL at 13:21

## 2023-05-02 RX ADMIN — SODIUM CHLORIDE 1000 MILLILITER(S): 9 INJECTION, SOLUTION INTRAVENOUS at 08:17

## 2023-05-02 RX ADMIN — HYDROMORPHONE HYDROCHLORIDE 0.25 MILLIGRAM(S): 2 INJECTION INTRAMUSCULAR; INTRAVENOUS; SUBCUTANEOUS at 03:00

## 2023-05-02 RX ADMIN — Medication 400 MILLIGRAM(S): at 21:44

## 2023-05-02 RX ADMIN — Medication 1000 MILLIGRAM(S): at 06:00

## 2023-05-02 NOTE — BRIEF OPERATIVE NOTE - NSICDXBRIEFPOSTOP_GEN_ALL_CORE_FT
POST-OP DIAGNOSIS:  Anastomotic leak of intestine 02-May-2023 01:14:35  Jeremy Singleton  
POST-OP DIAGNOSIS:  Diverticulosis of colon 25-Apr-2023 12:38:35 History of sever diverticular bleeding Elis Narayan

## 2023-05-02 NOTE — PROGRESS NOTE ADULT - SUBJECTIVE AND OBJECTIVE BOX
58 y/o male patient with extensive colonic diverticulitis and rectal bleeding; comes in for colorectal surgery management and admission to surgery [facility] for perioperative care. IR placed LLQ drain 5/1/2023. Pt went to the OR last night for exlap, and repair of iliorectal anastomosis. Exchanged drain for 19F brandin drain. Drain site was c/d/i.

## 2023-05-02 NOTE — PROGRESS NOTE ADULT - SUBJECTIVE AND OBJECTIVE BOX
Procedure: Exploratory laparotomy with open creation of diverting loop ileostomy  Surgeon: Dr. Villegas    S: Pt seen and examined bedside. He reports pain is well controlled and he has been sleeping comfortably. Some discomfort at site of ho. Denies CP, SOB, VELASCO, calf tenderness or edema, nausea, emesis, or fevers.    O:  T(C): 36.6 (05-02-23 @ 03:15), Max: 36.8 (05-02-23 @ 02:10)  T(F): 97.8 (05-02-23 @ 03:15), Max: 98.3 (05-02-23 @ 02:10)  HR: 84 (05-02-23 @ 03:38) (84 - 103)  BP: 109/72 (05-02-23 @ 03:38) (103/67 - 117/64)  RR: 17 (05-02-23 @ 03:38) (14 - 25)  SpO2: 94% (05-02-23 @ 03:38) (85% - 98%)  Wt(kg): --                        11.0   13.85 )-----------( 506      ( 01 May 2023 16:28 )             36.9     05-01    136  |  105  |  15  ----------------------------<  103<H>  3.7   |  19<L>  |  1.19    Ca    9.2      01 May 2023 16:28  Phos  3.8     05-01  Mg     2.0     05-01    TPro  6.2  /  Alb  3.0<L>  /  TBili  0.5  /  DBili  x   /  AST  12  /  ALT  10  /  AlkPhos  78  05-01      Gen: NAD, resting comfortably in bed, nasal cannula  C/V: NSR  Pulm: Nonlabored breathing, no respiratory distress  Abd: soft, NT/ND. DAVE drain with serous OP, Stoma pink, patent and mildly edematous. Ostomy with minimal serosanguinous output. Incision: clean and intact, dressing lightly saturated.  Extrem: WWP, no calf edema    A/P: 57M with PMH of HTN, HLD, JOSE (no CPAP), diverticulitis presents for colon resection now s/p dx lap converted to open total colectomy 4/25. C/b leak and now s/p ex lap w/ creation of DLI 5/2    NPO/IVF  Pain/nausea control PRN  Zosyn (5/1-)  NGT to WILLIS Ho JP x1  SCDs/IS/OOB     Procedure: Exploratory laparotomy with open creation of diverting loop ileostomy  Surgeon: Dr. Villegas    S: Pt seen and examined bedside. He reports pain is well controlled and he has been sleeping comfortably. Some discomfort at site of ho. Denies CP, SOB, VELASCO, calf tenderness or edema, nausea, emesis, or fevers.    O:  T(C): 36.6 (05-02-23 @ 03:15), Max: 36.8 (05-02-23 @ 02:10)  T(F): 97.8 (05-02-23 @ 03:15), Max: 98.3 (05-02-23 @ 02:10)  HR: 84 (05-02-23 @ 03:38) (84 - 103)  BP: 109/72 (05-02-23 @ 03:38) (103/67 - 117/64)  RR: 17 (05-02-23 @ 03:38) (14 - 25)  SpO2: 94% (05-02-23 @ 03:38) (85% - 98%)  Wt(kg): --                        11.0   13.85 )-----------( 506      ( 01 May 2023 16:28 )             36.9     05-01    136  |  105  |  15  ----------------------------<  103<H>  3.7   |  19<L>  |  1.19    Ca    9.2      01 May 2023 16:28  Phos  3.8     05-01  Mg     2.0     05-01    TPro  6.2  /  Alb  3.0<L>  /  TBili  0.5  /  DBili  x   /  AST  12  /  ALT  10  /  AlkPhos  78  05-01      Gen: NAD, resting comfortably in bed, nasal cannula  HEENT: NGT to LIWS with bilious output in the tubing  C/V: NSR  Pulm: Nonlabored breathing, no respiratory distress  Abd: soft, NT/ND. DAVE drain with serous OP, Stoma pink, patent and mildly edematous. Ostomy with minimal serosanguinous output. Incision: clean and intact, dressing lightly saturated.  Extrem: WWP, no calf edema    A/P: 57M with PMH of HTN, HLD, JOSE (no CPAP), diverticulitis presents for colon resection now s/p dx lap converted to open total colectomy 4/25. C/b leak and now s/p ex lap w/ creation of DLI 5/2    NPO/IVF  Pain/nausea control PRN  Zosyn (5/1-)  NGT to WILLIS Ho JP x1  SCDs/IS/OOB     Procedure: Exploratory laparotomy with open creation of diverting loop ileostomy  Surgeon: Dr. Villegas    S: Pt seen and examined bedside. He reports pain is well controlled and he has been sleeping comfortably. Some discomfort at site of ho. Denies CP, SOB, VELASCO, calf tenderness or edema, nausea, emesis, or fevers.    O:  T(C): 36.6 (05-02-23 @ 03:15), Max: 36.8 (05-02-23 @ 02:10)  T(F): 97.8 (05-02-23 @ 03:15), Max: 98.3 (05-02-23 @ 02:10)  HR: 84 (05-02-23 @ 03:38) (84 - 103)  BP: 109/72 (05-02-23 @ 03:38) (103/67 - 117/64)  RR: 17 (05-02-23 @ 03:38) (14 - 25)  SpO2: 94% (05-02-23 @ 03:38) (85% - 98%)  Wt(kg): --                        11.0   13.85 )-----------( 506      ( 01 May 2023 16:28 )             36.9     05-01    136  |  105  |  15  ----------------------------<  103<H>  3.7   |  19<L>  |  1.19    Ca    9.2      01 May 2023 16:28  Phos  3.8     05-01  Mg     2.0     05-01    TPro  6.2  /  Alb  3.0<L>  /  TBili  0.5  /  DBili  x   /  AST  12  /  ALT  10  /  AlkPhos  78  05-01      Gen: NAD, resting comfortably in bed, nasal cannula  HEENT: NGT to LIWS with bilious output in the tubing  C/V: NSR  Pulm: Nonlabored breathing, no respiratory distress  Abd: soft, NT/ND. DAVE drain with serous OP, Stoma pink, patent and mildly edematous. Ostomy with minimal serosanguinous output. Incision: clean and intact, dressing lightly saturated.  Extrem: WWP, no calf edema    A/P: 57M with PMH of HTN, HLD, JOSE (no CPAP), diverticulitis presents for colon resection now s/p dx lap converted to open total colectomy 4/25. C/b leak and now s/p ex lap w/ creation of DLI 5/2    NPO/IVF  Pain/nausea control PRN  Zosyn (5/1-)  NGT to WILLIS Ho JP x1  HSQ/SCDs  IS/OOB

## 2023-05-02 NOTE — BRIEF OPERATIVE NOTE - NSICDXBRIEFPROCEDURE_GEN_ALL_CORE_FT
PROCEDURES:  Total abdominal colectomy with ileoproctostomy 25-Apr-2023 12:36:54  Elis Narayan  Proctoscopy 25-Apr-2023 12:39:24  Elis Narayan  
PROCEDURES:  Exploratory laparotomy 02-May-2023 01:08:12  Jeremy Singleton  Open creation of diverting loop ileostomy 02-May-2023 01:14:11  Jeremy Singleton

## 2023-05-02 NOTE — ANESTHESIA FOLLOW-UP NOTE - NSEVALATION_GEN_ALL_CORE
No apparent complications or complaints regarding anesthesia care at this time/All questions were answered
Will send back to Dr. Kallie Quick for review since patient receivd 3-day supply of hydrocodone-APAP from ED doctor on 9/21/17.
No apparent complications or complaints regarding anesthesia care at this time/All questions were answered
No apparent complications or complaints regarding anesthesia care at this time/All questions were answered

## 2023-05-02 NOTE — BRIEF OPERATIVE NOTE - NSICDXBRIEFPREOP_GEN_ALL_CORE_FT
PRE-OP DIAGNOSIS:  Diverticulosis 25-Apr-2023 12:38:06 History of severe diverticular bleeding Elis Narayan  
PRE-OP DIAGNOSIS:  Anastomotic leak of intestine 02-May-2023 01:14:24  Jeremy Singleton

## 2023-05-03 LAB
ANION GAP SERPL CALC-SCNC: 7 MMOL/L — SIGNIFICANT CHANGE UP (ref 5–17)
BUN SERPL-MCNC: 23 MG/DL — SIGNIFICANT CHANGE UP (ref 7–23)
CALCIUM SERPL-MCNC: 8.6 MG/DL — SIGNIFICANT CHANGE UP (ref 8.4–10.5)
CHLORIDE SERPL-SCNC: 109 MMOL/L — HIGH (ref 96–108)
CO2 SERPL-SCNC: 20 MMOL/L — LOW (ref 22–31)
CREAT SERPL-MCNC: 0.75 MG/DL — SIGNIFICANT CHANGE UP (ref 0.5–1.3)
EGFR: 104 ML/MIN/1.73M2 — SIGNIFICANT CHANGE UP
GLUCOSE SERPL-MCNC: 113 MG/DL — HIGH (ref 70–99)
HCT VFR BLD CALC: 28.3 % — LOW (ref 39–50)
HGB BLD-MCNC: 8.5 G/DL — LOW (ref 13–17)
MAGNESIUM SERPL-MCNC: 2.2 MG/DL — SIGNIFICANT CHANGE UP (ref 1.6–2.6)
MCHC RBC-ENTMCNC: 22 PG — LOW (ref 27–34)
MCHC RBC-ENTMCNC: 30 GM/DL — LOW (ref 32–36)
MCV RBC AUTO: 73.1 FL — LOW (ref 80–100)
NRBC # BLD: 0 /100 WBCS — SIGNIFICANT CHANGE UP (ref 0–0)
PHOSPHATE SERPL-MCNC: 2.4 MG/DL — LOW (ref 2.5–4.5)
PLATELET # BLD AUTO: 421 K/UL — HIGH (ref 150–400)
POTASSIUM SERPL-MCNC: 4.2 MMOL/L — SIGNIFICANT CHANGE UP (ref 3.5–5.3)
POTASSIUM SERPL-SCNC: 4.2 MMOL/L — SIGNIFICANT CHANGE UP (ref 3.5–5.3)
RBC # BLD: 3.87 M/UL — LOW (ref 4.2–5.8)
RBC # FLD: 21.9 % — HIGH (ref 10.3–14.5)
SODIUM SERPL-SCNC: 136 MMOL/L — SIGNIFICANT CHANGE UP (ref 135–145)
WBC # BLD: 15.7 K/UL — HIGH (ref 3.8–10.5)
WBC # FLD AUTO: 15.7 K/UL — HIGH (ref 3.8–10.5)

## 2023-05-03 RX ORDER — ACETAMINOPHEN 500 MG
1000 TABLET ORAL ONCE
Refills: 0 | Status: COMPLETED | OUTPATIENT
Start: 2023-05-03 | End: 2023-05-03

## 2023-05-03 RX ADMIN — Medication 145 MILLIGRAM(S): at 11:13

## 2023-05-03 RX ADMIN — Medication 1000 MILLIGRAM(S): at 04:30

## 2023-05-03 RX ADMIN — HYDROMORPHONE HYDROCHLORIDE 0.5 MILLIGRAM(S): 2 INJECTION INTRAMUSCULAR; INTRAVENOUS; SUBCUTANEOUS at 20:59

## 2023-05-03 RX ADMIN — PIPERACILLIN AND TAZOBACTAM 25 GRAM(S): 4; .5 INJECTION, POWDER, LYOPHILIZED, FOR SOLUTION INTRAVENOUS at 13:34

## 2023-05-03 RX ADMIN — SODIUM CHLORIDE 150 MILLILITER(S): 9 INJECTION, SOLUTION INTRAVENOUS at 06:32

## 2023-05-03 RX ADMIN — HYDROMORPHONE HYDROCHLORIDE 0.5 MILLIGRAM(S): 2 INJECTION INTRAMUSCULAR; INTRAVENOUS; SUBCUTANEOUS at 20:38

## 2023-05-03 RX ADMIN — PIPERACILLIN AND TAZOBACTAM 25 GRAM(S): 4; .5 INJECTION, POWDER, LYOPHILIZED, FOR SOLUTION INTRAVENOUS at 22:52

## 2023-05-03 RX ADMIN — HEPARIN SODIUM 5000 UNIT(S): 5000 INJECTION INTRAVENOUS; SUBCUTANEOUS at 05:21

## 2023-05-03 RX ADMIN — PANTOPRAZOLE SODIUM 40 MILLIGRAM(S): 20 TABLET, DELAYED RELEASE ORAL at 11:13

## 2023-05-03 RX ADMIN — Medication 62.5 MILLIMOLE(S): at 11:12

## 2023-05-03 RX ADMIN — Medication 400 MILLIGRAM(S): at 11:12

## 2023-05-03 RX ADMIN — HYDROMORPHONE HYDROCHLORIDE 0.5 MILLIGRAM(S): 2 INJECTION INTRAMUSCULAR; INTRAVENOUS; SUBCUTANEOUS at 14:37

## 2023-05-03 RX ADMIN — Medication 1000 MILLIGRAM(S): at 12:49

## 2023-05-03 RX ADMIN — PIPERACILLIN AND TAZOBACTAM 25 GRAM(S): 4; .5 INJECTION, POWDER, LYOPHILIZED, FOR SOLUTION INTRAVENOUS at 05:21

## 2023-05-03 RX ADMIN — HEPARIN SODIUM 5000 UNIT(S): 5000 INJECTION INTRAVENOUS; SUBCUTANEOUS at 22:51

## 2023-05-03 RX ADMIN — HEPARIN SODIUM 5000 UNIT(S): 5000 INJECTION INTRAVENOUS; SUBCUTANEOUS at 13:34

## 2023-05-03 RX ADMIN — Medication 400 MILLIGRAM(S): at 03:44

## 2023-05-03 NOTE — PROGRESS NOTE ADULT - SUBJECTIVE AND OBJECTIVE BOX
INTERVAL HPI/OVERNIGHT EVENTS: cepacol x1, ostomy with dark liquid output, DAVE serous, hurricaine spray x1    STATUS POST:    4/25: diagnostic laparoscopy converted to open total colectomy  5/2: Exploratory laparotomy with open creation of diverting loop ileostomy    SUBJECTIVE: Pt seen and examined at bedside this am by surgery team. No acute complaints. Overall pain well controlled. Denies f/n/v/cp/sob.    MEDICATIONS  (STANDING):  diltiazem    milliGRAM(s) Oral daily  fenofibrate Tablet 145 milliGRAM(s) Oral daily  heparin   Injectable 5000 Unit(s) SubCutaneous every 8 hours  lactated ringers. 1000 milliLiter(s) (150 mL/Hr) IV Continuous <Continuous>  pantoprazole  Injectable 40 milliGRAM(s) IV Push daily  piperacillin/tazobactam IVPB.. 3.375 Gram(s) IV Intermittent every 8 hours    MEDICATIONS  (PRN):  HYDROmorphone  Injectable 0.5 milliGRAM(s) IV Push every 4 hours PRN Severe Pain (7 - 10)  HYDROmorphone  Injectable 0.25 milliGRAM(s) IV Push every 4 hours PRN Moderate Pain (4 - 6)  naloxone Injectable 0.1 milliGRAM(s) IV Push every 3 minutes PRN For ANY of the following changes in patient status:  A. RR LESS THAN 10 breaths per minute, B. Oxygen saturation LESS THAN 90%, C. Sedation score of 6  ondansetron Injectable 4 milliGRAM(s) IV Push every 6 hours PRN Nausea and/or Vomiting    Vital Signs Last 24 Hrs  T(C): 36.6 (03 May 2023 04:29), Max: 37.7 (02 May 2023 17:58)  T(F): 97.8 (03 May 2023 04:29), Max: 99.8 (02 May 2023 17:58)  HR: 84 (03 May 2023 03:40) (84 - 104)  BP: 148/98 (03 May 2023 03:40) (136/91 - 148/98)  BP(mean): 118 (03 May 2023 03:40) (105 - 118)  RR: 17 (03 May 2023 03:40) (17 - 17)  SpO2: 95% (03 May 2023 03:40) (95% - 99%)    Parameters below as of 03 May 2023 03:40  Patient On (Oxygen Delivery Method): nasal cannula  O2 Flow (L/min): 3    PHYSICAL EXAM:    Constitutional: A&Ox3, NAD. NGT in place    Respiratory: non labored breathing, no respiratory distress    Cardiovascular: NSR, RRR    Gastrointestinal: abdomen soft, mild distended, nt. No rebound or guarding. Ostomy w/ stoma pink and viable, gas and stool noted in bag. DAVE w/ serous OP    : Mcmanus in place     Extremities: wwp, no calf tenderness or edema. SCDs in place     I&O's Detail    02 May 2023 07:01  -  03 May 2023 07:00  --------------------------------------------------------  IN:    IV PiggyBack: 300 mL    Lactated Ringers: 3600 mL    Lactated Ringers Bolus: 1000 mL  Total IN: 4900 mL    OUT:    Bulb (mL): 445 mL    Ileostomy (mL): 675 mL    Indwelling Catheter - Urethral (mL): 1400 mL    Nasogastric/Oral tube (mL): 700 mL  Total OUT: 3220 mL    Total NET: 1680 mL          LABS:                        8.5    15.70 )-----------( 421      ( 03 May 2023 05:30 )             28.3     05-03    136  |  109<H>  |  23  ----------------------------<  113<H>  4.2   |  20<L>  |  0.75    Ca    8.6      03 May 2023 05:30  Phos  2.4     05-03  Mg     2.2     05-03    TPro  6.2  /  Alb  3.0<L>  /  TBili  0.5  /  DBili  x   /  AST  12  /  ALT  10  /  AlkPhos  78  05-01    PT/INR - ( 01 May 2023 17:06 )   PT: 16.6 sec;   INR: 1.39          PTT - ( 01 May 2023 17:06 )  PTT:30.6 sec      RADIOLOGY & ADDITIONAL STUDIES:

## 2023-05-04 LAB
-  AMPICILLIN: SIGNIFICANT CHANGE UP
-  VANCOMYCIN: SIGNIFICANT CHANGE UP
ANION GAP SERPL CALC-SCNC: 10 MMOL/L — SIGNIFICANT CHANGE UP (ref 5–17)
BUN SERPL-MCNC: 16 MG/DL — SIGNIFICANT CHANGE UP (ref 7–23)
CALCIUM SERPL-MCNC: 8.7 MG/DL — SIGNIFICANT CHANGE UP (ref 8.4–10.5)
CHLORIDE SERPL-SCNC: 108 MMOL/L — SIGNIFICANT CHANGE UP (ref 96–108)
CO2 SERPL-SCNC: 19 MMOL/L — LOW (ref 22–31)
CREAT SERPL-MCNC: 0.82 MG/DL — SIGNIFICANT CHANGE UP (ref 0.5–1.3)
CULTURE RESULTS: SIGNIFICANT CHANGE UP
EGFR: 101 ML/MIN/1.73M2 — SIGNIFICANT CHANGE UP
GLUCOSE SERPL-MCNC: 97 MG/DL — SIGNIFICANT CHANGE UP (ref 70–99)
HCT VFR BLD CALC: 28.4 % — LOW (ref 39–50)
HGB BLD-MCNC: 8.5 G/DL — LOW (ref 13–17)
MAGNESIUM SERPL-MCNC: 2 MG/DL — SIGNIFICANT CHANGE UP (ref 1.6–2.6)
MCHC RBC-ENTMCNC: 21.8 PG — LOW (ref 27–34)
MCHC RBC-ENTMCNC: 29.9 GM/DL — LOW (ref 32–36)
MCV RBC AUTO: 72.8 FL — LOW (ref 80–100)
METHOD TYPE: SIGNIFICANT CHANGE UP
NRBC # BLD: 0 /100 WBCS — SIGNIFICANT CHANGE UP (ref 0–0)
ORGANISM # SPEC MICROSCOPIC CNT: SIGNIFICANT CHANGE UP
ORGANISM # SPEC MICROSCOPIC CNT: SIGNIFICANT CHANGE UP
PHOSPHATE SERPL-MCNC: 2.3 MG/DL — LOW (ref 2.5–4.5)
PLATELET # BLD AUTO: 475 K/UL — HIGH (ref 150–400)
POTASSIUM SERPL-MCNC: 3.6 MMOL/L — SIGNIFICANT CHANGE UP (ref 3.5–5.3)
POTASSIUM SERPL-SCNC: 3.6 MMOL/L — SIGNIFICANT CHANGE UP (ref 3.5–5.3)
RBC # BLD: 3.9 M/UL — LOW (ref 4.2–5.8)
RBC # FLD: 21.6 % — HIGH (ref 10.3–14.5)
SODIUM SERPL-SCNC: 137 MMOL/L — SIGNIFICANT CHANGE UP (ref 135–145)
SPECIMEN SOURCE: SIGNIFICANT CHANGE UP
SURGICAL PATHOLOGY STUDY: SIGNIFICANT CHANGE UP
WBC # BLD: 10.67 K/UL — HIGH (ref 3.8–10.5)
WBC # FLD AUTO: 10.67 K/UL — HIGH (ref 3.8–10.5)

## 2023-05-04 RX ORDER — SODIUM,POTASSIUM PHOSPHATES 278-250MG
1 POWDER IN PACKET (EA) ORAL ONCE
Refills: 0 | Status: COMPLETED | OUTPATIENT
Start: 2023-05-04 | End: 2023-05-04

## 2023-05-04 RX ORDER — SODIUM CHLORIDE 9 MG/ML
1000 INJECTION, SOLUTION INTRAVENOUS
Refills: 0 | Status: DISCONTINUED | OUTPATIENT
Start: 2023-05-04 | End: 2023-05-04

## 2023-05-04 RX ORDER — LANOLIN ALCOHOL/MO/W.PET/CERES
5 CREAM (GRAM) TOPICAL AT BEDTIME
Refills: 0 | Status: DISCONTINUED | OUTPATIENT
Start: 2023-05-04 | End: 2023-05-09

## 2023-05-04 RX ADMIN — HYDROMORPHONE HYDROCHLORIDE 0.5 MILLIGRAM(S): 2 INJECTION INTRAMUSCULAR; INTRAVENOUS; SUBCUTANEOUS at 11:33

## 2023-05-04 RX ADMIN — HYDROMORPHONE HYDROCHLORIDE 0.5 MILLIGRAM(S): 2 INJECTION INTRAMUSCULAR; INTRAVENOUS; SUBCUTANEOUS at 21:15

## 2023-05-04 RX ADMIN — HYDROMORPHONE HYDROCHLORIDE 0.5 MILLIGRAM(S): 2 INJECTION INTRAMUSCULAR; INTRAVENOUS; SUBCUTANEOUS at 05:01

## 2023-05-04 RX ADMIN — PANTOPRAZOLE SODIUM 40 MILLIGRAM(S): 20 TABLET, DELAYED RELEASE ORAL at 12:03

## 2023-05-04 RX ADMIN — PIPERACILLIN AND TAZOBACTAM 25 GRAM(S): 4; .5 INJECTION, POWDER, LYOPHILIZED, FOR SOLUTION INTRAVENOUS at 05:00

## 2023-05-04 RX ADMIN — HEPARIN SODIUM 5000 UNIT(S): 5000 INJECTION INTRAVENOUS; SUBCUTANEOUS at 05:01

## 2023-05-04 RX ADMIN — HEPARIN SODIUM 5000 UNIT(S): 5000 INJECTION INTRAVENOUS; SUBCUTANEOUS at 22:40

## 2023-05-04 RX ADMIN — Medication 360 MILLIGRAM(S): at 05:00

## 2023-05-04 RX ADMIN — HYDROMORPHONE HYDROCHLORIDE 0.5 MILLIGRAM(S): 2 INJECTION INTRAMUSCULAR; INTRAVENOUS; SUBCUTANEOUS at 12:03

## 2023-05-04 RX ADMIN — HEPARIN SODIUM 5000 UNIT(S): 5000 INJECTION INTRAVENOUS; SUBCUTANEOUS at 12:47

## 2023-05-04 RX ADMIN — PIPERACILLIN AND TAZOBACTAM 25 GRAM(S): 4; .5 INJECTION, POWDER, LYOPHILIZED, FOR SOLUTION INTRAVENOUS at 22:39

## 2023-05-04 RX ADMIN — HYDROMORPHONE HYDROCHLORIDE 0.5 MILLIGRAM(S): 2 INJECTION INTRAMUSCULAR; INTRAVENOUS; SUBCUTANEOUS at 00:51

## 2023-05-04 RX ADMIN — Medication 145 MILLIGRAM(S): at 12:03

## 2023-05-04 RX ADMIN — PIPERACILLIN AND TAZOBACTAM 25 GRAM(S): 4; .5 INJECTION, POWDER, LYOPHILIZED, FOR SOLUTION INTRAVENOUS at 12:47

## 2023-05-04 RX ADMIN — HYDROMORPHONE HYDROCHLORIDE 0.5 MILLIGRAM(S): 2 INJECTION INTRAMUSCULAR; INTRAVENOUS; SUBCUTANEOUS at 05:31

## 2023-05-04 RX ADMIN — Medication 1 PACKET(S): at 11:33

## 2023-05-04 RX ADMIN — SODIUM CHLORIDE 150 MILLILITER(S): 9 INJECTION, SOLUTION INTRAVENOUS at 00:46

## 2023-05-04 RX ADMIN — HYDROMORPHONE HYDROCHLORIDE 0.5 MILLIGRAM(S): 2 INJECTION INTRAMUSCULAR; INTRAVENOUS; SUBCUTANEOUS at 20:49

## 2023-05-04 RX ADMIN — HYDROMORPHONE HYDROCHLORIDE 0.5 MILLIGRAM(S): 2 INJECTION INTRAMUSCULAR; INTRAVENOUS; SUBCUTANEOUS at 01:20

## 2023-05-04 NOTE — PROGRESS NOTE ADULT - SUBJECTIVE AND OBJECTIVE BOX
INTERVAL HPI/OVERNIGHT EVENTS: ostomy w. gas and stool, DAVE w. serous op, slava sips of clears, -n/-v     STATUS POST:  5/2: Exploratory laparotomy with open creation of diverting loop ileostomy    POST OPERATIVE DAY #: 2    SUBJECTIVE: Pt seen and examined at bedside this am by surgery team. No acute complaints. Tolerating diet, pain well controlled. Denies f/n/v/cp/sob.    MEDICATIONS  (STANDING):  diltiazem    milliGRAM(s) Oral daily  fenofibrate Tablet 145 milliGRAM(s) Oral daily  heparin   Injectable 5000 Unit(s) SubCutaneous every 8 hours  lactated ringers. 1000 milliLiter(s) (130 mL/Hr) IV Continuous <Continuous>  melatonin 5 milliGRAM(s) Oral at bedtime  pantoprazole  Injectable 40 milliGRAM(s) IV Push daily  piperacillin/tazobactam IVPB.. 3.375 Gram(s) IV Intermittent every 8 hours  potassium phosphate / sodium phosphate Powder (PHOS-NaK) 1 Packet(s) Oral once    MEDICATIONS  (PRN):  HYDROmorphone  Injectable 0.5 milliGRAM(s) IV Push every 4 hours PRN Severe Pain (7 - 10)  HYDROmorphone  Injectable 0.25 milliGRAM(s) IV Push every 4 hours PRN Moderate Pain (4 - 6)  naloxone Injectable 0.1 milliGRAM(s) IV Push every 3 minutes PRN For ANY of the following changes in patient status:  A. RR LESS THAN 10 breaths per minute, B. Oxygen saturation LESS THAN 90%, C. Sedation score of 6  ondansetron Injectable 4 milliGRAM(s) IV Push every 6 hours PRN Nausea and/or Vomiting    Vital Signs Last 24 Hrs  T(C): 37.1 (04 May 2023 09:30), Max: 37.1 (04 May 2023 09:30)  T(F): 98.8 (04 May 2023 09:30), Max: 98.8 (04 May 2023 09:30)  HR: 79 (04 May 2023 09:30) (58 - 80)  BP: 132/82 (04 May 2023 09:30) (131/86 - 145/86)  BP(mean): 72 (03 May 2023 17:44) (72 - 98)  RR: 19 (04 May 2023 09:30) (16 - 19)  SpO2: 92% (04 May 2023 09:30) (92% - 99%)    Parameters below as of 04 May 2023 09:30  Patient On (Oxygen Delivery Method): room air      PHYSICAL EXAM:    Constitutional: A&Ox3, NAD     Respiratory: non labored breathing, no respiratory distress    Cardiovascular: NSR, RRR    Gastrointestinal: abdomen soft, mild distended, nt. No rebound or guarding. Ostomy w/ stoma pink and viable, gas and stool noted in bag. DAVE w/ serous OP.     Extremities: wwp, no calf tenderness or edema. SCDs in place       I&O's Detail    03 May 2023 07:01  -  04 May 2023 07:00  --------------------------------------------------------  IN:    IV PiggyBack: 450 mL    IV PiggyBack: 100 mL    Lactated Ringers: 2700 mL    Lactated Ringers: 650 mL    Oral Fluid: 300 mL  Total IN: 4200 mL    OUT:    Bulb (mL): 595 mL    Ileostomy (mL): 1050 mL    Voided (mL): 3050 mL  Total OUT: 4695 mL    Total NET: -495 mL      04 May 2023 07:01  -  04 May 2023 11:10  --------------------------------------------------------  IN:    Lactated Ringers: 520 mL  Total IN: 520 mL    OUT:    Bulb (mL): 60 mL    Ileostomy (mL): 150 mL    Voided (mL): 250 mL  Total OUT: 460 mL    Total NET: 60 mL          LABS:                        8.5    10.67 )-----------( 475      ( 04 May 2023 05:30 )             28.4     05-04    137  |  108  |  16  ----------------------------<  97  3.6   |  19<L>  |  0.82    Ca    8.7      04 May 2023 05:30  Phos  2.3     05-04  Mg     2.0     05-04            RADIOLOGY & ADDITIONAL STUDIES:

## 2023-05-05 ENCOUNTER — TRANSCRIPTION ENCOUNTER (OUTPATIENT)
Age: 58
End: 2023-05-05

## 2023-05-05 LAB
ANION GAP SERPL CALC-SCNC: 11 MMOL/L — SIGNIFICANT CHANGE UP (ref 5–17)
BUN SERPL-MCNC: 12 MG/DL — SIGNIFICANT CHANGE UP (ref 7–23)
CALCIUM SERPL-MCNC: 9 MG/DL — SIGNIFICANT CHANGE UP (ref 8.4–10.5)
CHLORIDE SERPL-SCNC: 104 MMOL/L — SIGNIFICANT CHANGE UP (ref 96–108)
CO2 SERPL-SCNC: 18 MMOL/L — LOW (ref 22–31)
CREAT SERPL-MCNC: 0.83 MG/DL — SIGNIFICANT CHANGE UP (ref 0.5–1.3)
EGFR: 101 ML/MIN/1.73M2 — SIGNIFICANT CHANGE UP
GLUCOSE SERPL-MCNC: 97 MG/DL — SIGNIFICANT CHANGE UP (ref 70–99)
HCT VFR BLD CALC: 31.4 % — LOW (ref 39–50)
HGB BLD-MCNC: 9.3 G/DL — LOW (ref 13–17)
MAGNESIUM SERPL-MCNC: 2 MG/DL — SIGNIFICANT CHANGE UP (ref 1.6–2.6)
MCHC RBC-ENTMCNC: 21.7 PG — LOW (ref 27–34)
MCHC RBC-ENTMCNC: 29.6 GM/DL — LOW (ref 32–36)
MCV RBC AUTO: 73.2 FL — LOW (ref 80–100)
NRBC # BLD: 0 /100 WBCS — SIGNIFICANT CHANGE UP (ref 0–0)
PHOSPHATE SERPL-MCNC: 4.2 MG/DL — SIGNIFICANT CHANGE UP (ref 2.5–4.5)
PLATELET # BLD AUTO: 608 K/UL — HIGH (ref 150–400)
POTASSIUM SERPL-MCNC: 3.9 MMOL/L — SIGNIFICANT CHANGE UP (ref 3.5–5.3)
POTASSIUM SERPL-SCNC: 3.9 MMOL/L — SIGNIFICANT CHANGE UP (ref 3.5–5.3)
RBC # BLD: 4.29 M/UL — SIGNIFICANT CHANGE UP (ref 4.2–5.8)
RBC # FLD: 21.5 % — HIGH (ref 10.3–14.5)
SODIUM SERPL-SCNC: 133 MMOL/L — LOW (ref 135–145)
WBC # BLD: 10.21 K/UL — SIGNIFICANT CHANGE UP (ref 3.8–10.5)
WBC # FLD AUTO: 10.21 K/UL — SIGNIFICANT CHANGE UP (ref 3.8–10.5)

## 2023-05-05 RX ORDER — ACETAMINOPHEN 500 MG
650 TABLET ORAL EVERY 6 HOURS
Refills: 0 | Status: DISCONTINUED | OUTPATIENT
Start: 2023-05-05 | End: 2023-05-09

## 2023-05-05 RX ORDER — DIPHENOXYLATE HCL/ATROPINE 2.5-.025MG
1 TABLET ORAL EVERY 12 HOURS
Refills: 0 | Status: DISCONTINUED | OUTPATIENT
Start: 2023-05-05 | End: 2023-05-05

## 2023-05-05 RX ORDER — OXYCODONE HYDROCHLORIDE 5 MG/1
1 TABLET ORAL
Qty: 10 | Refills: 0
Start: 2023-05-05

## 2023-05-05 RX ORDER — DIPHENOXYLATE HCL/ATROPINE 2.5-.025MG
1 TABLET ORAL ONCE
Refills: 0 | Status: DISCONTINUED | OUTPATIENT
Start: 2023-05-05 | End: 2023-05-05

## 2023-05-05 RX ORDER — DOCUSATE SODIUM 100 MG
1 CAPSULE ORAL
Qty: 20 | Refills: 0
Start: 2023-05-05

## 2023-05-05 RX ORDER — OXYCODONE HYDROCHLORIDE 5 MG/1
2.5 TABLET ORAL EVERY 6 HOURS
Refills: 0 | Status: DISCONTINUED | OUTPATIENT
Start: 2023-05-05 | End: 2023-05-05

## 2023-05-05 RX ORDER — OXYCODONE HYDROCHLORIDE 5 MG/1
5 TABLET ORAL EVERY 6 HOURS
Refills: 0 | Status: DISCONTINUED | OUTPATIENT
Start: 2023-05-05 | End: 2023-05-05

## 2023-05-05 RX ORDER — POTASSIUM CHLORIDE 20 MEQ
20 PACKET (EA) ORAL ONCE
Refills: 0 | Status: COMPLETED | OUTPATIENT
Start: 2023-05-05 | End: 2023-05-05

## 2023-05-05 RX ORDER — SODIUM CHLORIDE 9 MG/ML
1000 INJECTION, SOLUTION INTRAVENOUS ONCE
Refills: 0 | Status: COMPLETED | OUTPATIENT
Start: 2023-05-05 | End: 2023-05-05

## 2023-05-05 RX ADMIN — HEPARIN SODIUM 5000 UNIT(S): 5000 INJECTION INTRAVENOUS; SUBCUTANEOUS at 05:35

## 2023-05-05 RX ADMIN — PIPERACILLIN AND TAZOBACTAM 25 GRAM(S): 4; .5 INJECTION, POWDER, LYOPHILIZED, FOR SOLUTION INTRAVENOUS at 13:04

## 2023-05-05 RX ADMIN — HYDROMORPHONE HYDROCHLORIDE 0.5 MILLIGRAM(S): 2 INJECTION INTRAMUSCULAR; INTRAVENOUS; SUBCUTANEOUS at 01:26

## 2023-05-05 RX ADMIN — Medication 650 MILLIGRAM(S): at 11:09

## 2023-05-05 RX ADMIN — HEPARIN SODIUM 5000 UNIT(S): 5000 INJECTION INTRAVENOUS; SUBCUTANEOUS at 13:05

## 2023-05-05 RX ADMIN — Medication 650 MILLIGRAM(S): at 12:09

## 2023-05-05 RX ADMIN — Medication 145 MILLIGRAM(S): at 13:05

## 2023-05-05 RX ADMIN — PIPERACILLIN AND TAZOBACTAM 25 GRAM(S): 4; .5 INJECTION, POWDER, LYOPHILIZED, FOR SOLUTION INTRAVENOUS at 21:20

## 2023-05-05 RX ADMIN — Medication 5 MILLIGRAM(S): at 01:25

## 2023-05-05 RX ADMIN — SODIUM CHLORIDE 1000 MILLILITER(S): 9 INJECTION, SOLUTION INTRAVENOUS at 08:13

## 2023-05-05 RX ADMIN — Medication 650 MILLIGRAM(S): at 19:31

## 2023-05-05 RX ADMIN — Medication 20 MILLIEQUIVALENT(S): at 10:25

## 2023-05-05 RX ADMIN — HYDROMORPHONE HYDROCHLORIDE 0.5 MILLIGRAM(S): 2 INJECTION INTRAMUSCULAR; INTRAVENOUS; SUBCUTANEOUS at 02:02

## 2023-05-05 RX ADMIN — Medication 5 MILLIGRAM(S): at 23:20

## 2023-05-05 RX ADMIN — Medication 360 MILLIGRAM(S): at 05:36

## 2023-05-05 RX ADMIN — HEPARIN SODIUM 5000 UNIT(S): 5000 INJECTION INTRAVENOUS; SUBCUTANEOUS at 21:20

## 2023-05-05 RX ADMIN — PANTOPRAZOLE SODIUM 40 MILLIGRAM(S): 20 TABLET, DELAYED RELEASE ORAL at 11:09

## 2023-05-05 RX ADMIN — PIPERACILLIN AND TAZOBACTAM 25 GRAM(S): 4; .5 INJECTION, POWDER, LYOPHILIZED, FOR SOLUTION INTRAVENOUS at 05:36

## 2023-05-05 NOTE — DISCHARGE NOTE PROVIDER - NSDCMRMEDTOKEN_GEN_ALL_CORE_FT
Colace 100 mg oral capsule: 1 cap(s) orally 2 times a day as needed for -for constipation MDD: 2  DilTIAZem (Eqv-Cardizem CD) 360 mg/24 hours oral capsule, extended release: 1 orally once a day  fenofibrate 145 mg oral tablet: 1 orally once a day  omeprazole 40 mg oral delayed release capsule: 1 orally once a day  oxyCODONE 5 mg oral capsule: 1 cap(s) orally every 8 hours as needed for  severe pain MDD: 3  triamterene-hydrochlorothiazide 37.5 mg-25 mg oral capsule: 1 orally once a day   Colace 100 mg oral capsule: 1 cap(s) orally 2 times a day as needed for -for constipation MDD: 2  DilTIAZem (Eqv-Cardizem CD) 360 mg/24 hours oral capsule, extended release: 1 orally once a day  diphenoxylate-atropine 2.5 mg-0.025 mg oral tablet: 2 tab(s) orally every 12 hours MDD: 4  fenofibrate 145 mg oral tablet: 1 orally once a day  omeprazole 40 mg oral delayed release capsule: 1 orally once a day  oxyCODONE 5 mg oral capsule: 1 cap(s) orally every 8 hours as needed for  severe pain MDD: 3  triamterene-hydrochlorothiazide 37.5 mg-25 mg oral capsule: 1 orally once a day   Colace 100 mg oral capsule: 1 cap(s) orally 2 times a day as needed for -for constipation MDD: 2  DilTIAZem (Eqv-Cardizem CD) 360 mg/24 hours oral capsule, extended release: 1 orally once a day  diphenoxylate-atropine 2.5 mg-0.025 mg oral tablet: 2 tab(s) orally every 12 hours MDD: 4  fenofibrate 145 mg oral tablet: 1 orally once a day  omeprazole 40 mg oral delayed release capsule: 1 orally once a day  oxyCODONE 5 mg oral capsule: 1 cap(s) orally every 8 hours as needed for  severe pain MDD: 3  Rollator Walker: Patient needs a Rollator walker to ambulate  triamterene-hydrochlorothiazide 37.5 mg-25 mg oral capsule: 1 orally once a day

## 2023-05-05 NOTE — DISCHARGE NOTE PROVIDER - NSDCCPCAREPLAN_GEN_ALL_CORE_FT
PRINCIPAL DISCHARGE DIAGNOSIS  Diagnosis: Diverticulosis  Assessment and Plan of Treatment:      PRINCIPAL DISCHARGE DIAGNOSIS  Diagnosis: Diverticulosis  Assessment and Plan of Treatment:       SECONDARY DISCHARGE DIAGNOSES  Diagnosis: HTN (hypertension)  Assessment and Plan of Treatment:     Diagnosis: HLD (hyperlipidemia)  Assessment and Plan of Treatment:      PRINCIPAL DISCHARGE DIAGNOSIS  Diagnosis: Diverticulosis  Assessment and Plan of Treatment:       SECONDARY DISCHARGE DIAGNOSES  Diagnosis: HTN (hypertension)  Assessment and Plan of Treatment:     Diagnosis: HLD (hyperlipidemia)  Assessment and Plan of Treatment:     Diagnosis: Intestinal anastomotic leak  Assessment and Plan of Treatment:

## 2023-05-05 NOTE — DISCHARGE NOTE PROVIDER - NSDCFUADDINST_GEN_ALL_CORE_FT
Please follow up with Dr. Villegas next week. Call his office to schedule an appointment: [phone]. Call the office if you experience increasing pain, nausea, vomiting, swelling, redness, or leakage from stoma site, temperature >101.4F.    General Discharge Instructions:  Please resume all regular home medications unless specifically advised not to take a particular medication. Also, please take any new medications as prescribed.  Please get plenty of rest, continue to ambulate several times per day, and drink adequate amounts of fluids. Avoid lifting weights greater than 5-10 lbs until you follow-up with your surgeon, who will instruct you further regarding activity restrictions.  Avoid driving or operating heavy machinery while taking pain medications.  Please follow-up with your surgeon and Primary Care Provider (PCP) as advised.  Incision Care:  *Please call your doctor or nurse practitioner if you have increased pain, swelling, redness, or drainage from the incision site.  *Avoid swimming and baths until your follow-up appointment.  *You may shower, and wash surgical incisions with a mild soap and warm water. Gently pat the area dry.  *If you have staples, they will be removed at your follow-up appointment.  *If you have steri-strips, they will fall off on their own. Please remove any remaining strips 7-10 days after surgery.    Warning Signs:  Please call your doctor or nurse practitioner if you experience the following:  *You experience new chest pain, pressure, squeezing or tightness.  *New or worsening cough, shortness of breath, or wheeze.  *If you are vomiting and cannot keep down fluids or your medications.  *You are getting dehydrated due to continued vomiting, diarrhea, or other reasons. Signs of dehydration include dry mouth, rapid heartbeat, or feeling dizzy or faint when standing.  *You see blood or dark/black material when you vomit or have a bowel movement.  *You experience burning when you urinate, have blood in your urine, or experience a discharge.  *Your pain is not improving within 8-12 hours or is not gone within 24 hours. Call or return immediately if your pain is getting worse, changes location, or moves to your chest or back.  *You have shaking chills, or fever greater than 101.5 degrees Fahrenheit or 38 degrees Celsius.  *Any change in your symptoms, or any new symptoms that concern you.         Please follow up with Dr. Villegas next week. Call his office to schedule an appointment: [phone]. Call the office if you experience increasing pain, nausea, vomiting, swelling, redness, or leakage from stoma site, temperature >101.4F.    General Discharge Instructions:  Please resume all regular home medications unless specifically advised not to take a particular medication. Also, please take any new medications as prescribed.  Please get plenty of rest, continue to ambulate several times per day, and drink adequate amounts of fluids. Avoid lifting weights greater than 5-10 lbs until you follow-up with your surgeon, who will instruct you further regarding activity restrictions.  Avoid driving or operating heavy machinery while taking pain medications.  Please follow-up with your surgeon and Primary Care Provider (PCP) as advised.  Incision Care:  *Please call your doctor or nurse practitioner if you have increased pain, swelling, redness, or drainage from the incision site.  *Avoid swimming and baths until your follow-up appointment.  *You may shower, and wash surgical incisions with a mild soap and warm water. Gently pat the area dry.  *Staples will be removed at your follow-up appointment.      Warning Signs:  Please call your doctor or nurse practitioner if you experience the following:  *You experience new chest pain, pressure, squeezing or tightness.  *New or worsening cough, shortness of breath, or wheeze.  *If you are vomiting and cannot keep down fluids or your medications.  *You are getting dehydrated due to continued vomiting, diarrhea, or other reasons. Signs of dehydration include dry mouth, rapid heartbeat, or feeling dizzy or faint when standing.  *You see blood or dark/black material when you vomit or have a bowel movement.  *You experience burning when you urinate, have blood in your urine, or experience a discharge.  *Your pain is not improving within 8-12 hours or is not gone within 24 hours. Call or return immediately if your pain is getting worse, changes location, or moves to your chest or back.  *You have shaking chills, or fever greater than 101.5 degrees Fahrenheit or 38 degrees Celsius.  *Any change in your symptoms, or any new symptoms that concern you.         Please follow up with Dr. Villegas next week. Call his office to schedule an appointment: [phone]. Call the office if you experience increasing pain, nausea, vomiting, swelling, redness, or leakage from stoma site, temperature >101.4F.    Please take lomotil 2 pills twice daily.  Keep well hydrated. Replace fluid loss from ostomy daily. Avoid only drinking plain water. Include Gatorade and/or other vitamin drinks to replace fluid. Try to maintain ostomy output between 1000mL to 1500mL per day.     Pain control:  Please take 2 tabs of extra strength Tylenol every 6 hours as needed for pain. DO NOT exceed 4000 mg per day. You have been prescribed oxycodone for pain not controlled by Tylenol. Take 1 tab every 6 hours as needed for severe pain. Please do not exceed 4 tabs per day. Take prescribed stool softener (colace) to prevent constipation caused by oxycodone     General Discharge Instructions:  Please resume all regular home medications unless specifically advised not to take a particular medication. Also, please take any new medications as prescribed.  Please get plenty of rest, continue to ambulate several times per day, and drink adequate amounts of fluids. Avoid lifting weights greater than 5-10 lbs until you follow-up with your surgeon, who will instruct you further regarding activity restrictions.  Avoid driving or operating heavy machinery while taking pain medications.  Please follow-up with your surgeon and Primary Care Provider (PCP) as advised.  Incision Care:  *Please call your doctor or nurse practitioner if you have increased pain, swelling, redness, or drainage from the incision site.  *Avoid swimming and baths until your follow-up appointment.  *You may shower, and wash surgical incisions with a mild soap and warm water. Gently pat the area dry.  *Staples will be removed at your follow-up appointment.  Warning Signs:  Please call your doctor or nurse practitioner if you experience the following:  *You experience new chest pain, pressure, squeezing or tightness.  *New or worsening cough, shortness of breath, or wheeze.  *If you are vomiting and cannot keep down fluids or your medications.  *You are getting dehydrated due to continued vomiting, diarrhea, or other reasons. Signs of dehydration include dry mouth, rapid heartbeat, or feeling dizzy or faint when standing.  *You see blood or dark/black material when you vomit or have a bowel movement.  *You experience burning when you urinate, have blood in your urine, or experience a discharge.  *Your pain is not improving within 8-12 hours or is not gone within 24 hours. Call or return immediately if your pain is getting worse, changes location, or moves to your chest or back.  *You have shaking chills, or fever greater than 101.5 degrees Fahrenheit or 38 degrees Celsius.  *Any change in your symptoms, or any new symptoms that concern you. Please follow up with Dr. Villegas next week. Call his office to schedule an appointment: [phone]. Call the office if you experience increasing pain, nausea, vomiting, swelling, redness, or leakage from stoma site, temperature >101.4F.    Please take lomotil 2 pills twice daily.  Keep well hydrated. Replace fluid loss from ostomy daily. Avoid only drinking plain water. Include Gatorade and/or other vitamin drinks to replace fluid. Try to maintain ostomy output between 1000mL to 1500mL per day.     Pain control:  Please take 2 tabs of extra strength Tylenol every 6 hours as needed for pain. DO NOT exceed 4000 mg per day. You have been prescribed oxycodone for pain not controlled by Tylenol. Take 1 tab every 6 hours as needed for severe pain. Please do not exceed 4 tabs per day. Take prescribed stool softener (colace) to prevent constipation caused by oxycodone     General Discharge Instructions:  Please resume all regular home medications unless specifically advised not to take a particular medication. Also, please take any new medications as prescribed.  Please get plenty of rest, continue to ambulate several times per day, and drink adequate amounts of fluids. Avoid lifting weights greater than 5-10 lbs until you follow-up with your surgeon, who will instruct you further regarding activity restrictions.  Avoid driving or operating heavy machinery while taking pain medications.  Please follow-up with your surgeon and Primary Care Provider (PCP) as advised.  Incision Care:  *Please call your doctor  if you have increased pain, swelling, redness, or drainage from the incision site.  *Avoid swimming and baths until your follow-up appointment.  *You may shower, and wash surgical incisions with a mild soap and warm water. Gently pat the area dry.  *Staples will be removed at your follow-up appointment.  Warning Signs:  Please call your doctor or nurse practitioner if you experience the following:  *You experience new chest pain, pressure, squeezing or tightness.  *New or worsening cough, shortness of breath, or wheeze.  *If you are vomiting and cannot keep down fluids or your medications.  *You are getting dehydrated due to continued vomiting, or other reasons. Signs of dehydration include dry mouth, rapid heartbeat, or feeling dizzy or faint when standing.  *You experience burning when you urinate, have blood in your urine, or experience a discharge.  *Your pain is not improving within 8-12 hours or is not gone within 24 hours. Call or return immediately if your pain is getting worse, changes location, or moves to your chest or back.  *You have shaking chills, or fever greater than 101.5 degrees Fahrenheit or 38 degrees Celsius.  *Any change in your symptoms, or any new symptoms that concern you.    OSTOMY CARE;  Ileostomy: stoma red, protruding, 1 1/2".   Pouch changed  with 2 piece 2 3/4" Karyna pouch and [employer] ring. Pouch changed when the bag is filled and requires emptying

## 2023-05-05 NOTE — DISCHARGE NOTE PROVIDER - NSDCCPTREATMENT_GEN_ALL_CORE_FT
PRINCIPAL PROCEDURE  Procedure: Total abdominal colectomy with ileoproctostomy  Findings and Treatment:

## 2023-05-05 NOTE — PROGRESS NOTE ADULT - SUBJECTIVE AND OBJECTIVE BOX
INTERVAL HPI/OVERNIGHT EVENTS: 5/5: Adv to LRD, 1L bolus, IVHL, Lomotid BID started inc ostomy o/p    STATUS POST: 4/25: diagnostic laparoscopy converted to open total colectomy  5/2: Exploratory laparotomy with open creation of diverting loop ileostomy    POST OPERATIVE DAY #: 3    SUBJECTIVE: Patient seen on AM rounds by team and chief resident. This morning, he feels well; his pain is well-controlled. Tolerating diet. No nausea or vomiting. Ostomy with output and gas. No acute complaints.       diltiazem    milliGRAM(s) Oral daily  heparin   Injectable 5000 Unit(s) SubCutaneous every 8 hours  piperacillin/tazobactam IVPB.. 3.375 Gram(s) IV Intermittent every 8 hours      Vital Signs Last 24 Hrs  T(C): 36.8 (05 May 2023 08:16), Max: 37.2 (04 May 2023 16:39)  T(F): 98.3 (05 May 2023 08:16), Max: 98.9 (04 May 2023 16:39)  HR: 78 (05 May 2023 08:16) (77 - 83)  BP: 130/86 (05 May 2023 08:16) (117/75 - 140/87)  BP(mean): --  RR: 19 (05 May 2023 08:16) (17 - 19)  SpO2: 92% (05 May 2023 08:16) (92% - 94%)    Parameters below as of 05 May 2023 08:16  Patient On (Oxygen Delivery Method): room air      I&O's Detail    04 May 2023 07:01  -  05 May 2023 07:00  --------------------------------------------------------  IN:    IV PiggyBack: 75 mL    Lactated Ringers: 1040 mL    Oral Fluid: 200 mL  Total IN: 1315 mL    OUT:    Bulb (mL): 210 mL    Ileostomy (mL): 1500 mL    Voided (mL): 2300 mL  Total OUT: 4010 mL    Total NET: -2695 mL      05 May 2023 07:01  -  05 May 2023 10:00  --------------------------------------------------------  IN:  Total IN: 0 mL    OUT:    Bulb (mL): 30 mL    Ileostomy (mL): 125 mL    Voided (mL): 600 mL  Total OUT: 755 mL    Total NET: -755 mL          General: NAD, resting comfortably in bed  C/V: NSR  Pulm: Nonlabored breathing, no respiratory distress  Abd: soft, minimally TTP/ND.  Extrem: WWP, no edema, SCDs in place  Ileostomy: ostomy p/p/p, dark bilious output and gas      LABS:                        9.3    10.21 )-----------( 608      ( 05 May 2023 05:30 )             31.4     05-05    133<L>  |  104  |  12  ----------------------------<  97  3.9   |  18<L>  |  0.83    Ca    9.0      05 May 2023 05:30  Phos  4.2     05-05  Mg     2.0     05-05            RADIOLOGY & ADDITIONAL STUDIES:

## 2023-05-05 NOTE — DISCHARGE NOTE NURSING/CASE MANAGEMENT/SOCIAL WORK - PATIENT PORTAL LINK FT
You can access the FollowMyHealth Patient Portal offered by [facility] by registering at the following website: [email or web address]. By joining [facility]’s FollowMyHealth portal, you will also be able to view your health information using other applications (apps) compatible with our system.

## 2023-05-05 NOTE — DISCHARGE NOTE PROVIDER - NSDCHHNEEDSERVICE_GEN_ALL_CORE
Wound care and assessment Ostomy care and management/Rehabilitation services/Wound care and assessment

## 2023-05-05 NOTE — DISCHARGE NOTE PROVIDER - HOSPITAL COURSE
57M PMH HTN, HLD, sleep apnea, kidney stones and PSH hernia repair, partial colon resection, h/o RBL x 2 (1990s, and recently w/ Dr. Deleon) referred to Dr. Villegas by Dr. Aime Deleon for workup of heavy rectal bleeding, found to have pancolonic diverticulosis, presented to [facility] on 4/25 for operative management now s/p diagnotic laparoscopy converted to open total colectomy (4/25) complicated by anastomotic leak requiring RTOR for exploratory laparotomy with open creation of diverting loop ileostomy (5/2).    On 4/25, patient underwent planned diagnostic laparoscopy converted to open total colectomy due to extensive adhesions. Post-operatively, patient progressed well, tolerated diet, was passing flatus and having bowel movements. On 4/28, patient has decrease PO intake 2/2 decreased appetite with multiple loose bowel movements. Despite lomotil and observation, patient's pain continued to worsen. CT scan on 5/1, was only significant for hairpin-like curve noted approximately 5 to 7 cm proximal to the anastomosis. Repeat CT scan was significant for intraperitoneal ascitic fluid and he underwent IR drainage (12Fr, 250cc serous fluid). Overnight, drain output became bilious and patient underwent exploratory laparotomy with open creation of diverting loop ileostomy (5/2), which was signficiant for a small 1mm leak at the anastomosis. The remainder of the patient's hospital course was uncomplicated. Diet was advanced as tolerated and pain was well controlled on medication. On day of discharge, patient had stable vital signs, was tolerating diet, voiding without assistance, and pain was controlled. The patient has met benchmarks for discharge with plans to follow up with Dr. Villegas in 1-2 weeks.    57M PMH HTN, HLD, sleep apnea, kidney stones and PSH hernia repair, partial colon resection, h/o RBL x 2 (1990s, and recently w/ Dr. Deleon) referred to Dr. Villegas by Dr. Aime Deleon for workup of heavy rectal bleeding, found to have pancolonic diverticulosis, presented to [facility] on 4/25 for operative management now s/p diagnotic laparoscopy converted to open total colectomy (4/25) complicated by anastomotic leak requiring RTOR for exploratory laparotomy with open creation of diverting loop ileostomy (5/2).    On 4/25, patient underwent planned diagnostic laparoscopy converted to open total colectomy due to extensive adhesions. Post-operatively, patient progressed well, tolerated diet, was passing flatus and having bowel movements. On 4/28, patient has decrease PO intake 2/2 decreased appetite with multiple loose bowel movements. Despite lomotil and observation, patient's pain continued to worsen. CT scan on 5/1, was only significant for hairpin-like curve noted approximately 5 to 7 cm proximal to the anastomosis. Repeat CT scan was significant for intraperitoneal ascitic fluid and he underwent IR drainage (12Fr, 250cc serous fluid). Overnight, drain output became bilious and patient underwent exploratory laparotomy with open creation of diverting loop ileostomy (5/2), which was significant for a small 1mm leak at the anastomosis. The remainder of the patient's hospital course was uncomplicated. Diet was advanced as tolerated and pain was well controlled on medication. On day of discharge, patient had stable vital signs, was tolerating diet, voiding without assistance, and pain was controlled. The patient has met benchmarks for discharge with plans to follow up with Dr. Villegas in 1-2 weeks.    57M PMH HTN, HLD, sleep apnea, kidney stones and PSH hernia repair, partial colon resection, h/o RBL x 2 (1990s, and recently w/ Dr. Deleon) referred to Dr. Villegas by Dr. Aime Deleon for workup of heavy rectal bleeding, found to have pancolonic diverticulosis, presented to [facility] on 4/25 for operative management now s/p diagnotic laparoscopy converted to open total colectomy (4/25) complicated by anastomotic leak requiring RTOR for exploratory laparotomy with open creation of diverting loop ileostomy (5/2).    On 4/25, patient underwent planned diagnostic laparoscopy converted to open total colectomy due to extensive adhesions. Post-operatively, patient progressed well, tolerated diet, was passing flatus and having bowel movements. On 4/28, patient has decrease PO intake 2/2 decreased appetite with multiple loose bowel movements. Despite lomotil and observation, patient's pain continued to worsen. CT scan on 5/1, was only significant for hairpin-like curve noted approximately 5 to 7 cm proximal to the anastomosis. Repeat CT scan was significant for intraperitoneal ascitic fluid and he underwent IR drainage (12Fr, 250cc serous fluid). Overnight, drain output became bilious and patient underwent exploratory laparotomy with open creation of diverting loop ileostomy (5/2), which was significant for a small 1mm leak at the anastomosis. The remainder of the patient's hospital course was uncomplicated. Diet was advanced as tolerated and pain was well controlled on medication. Prior to discharge the patient had an episode of orothostatic hypotension which resolved after adequate intravenous hydration and repletion. On day of discharge, patient had stable vital signs, was tolerating diet, voiding without assistance, and pain was controlled. The patient has met benchmarks for discharge with plans to follow up with Dr. Villegas in 1-2 weeks.    57M PMH HTN, HLD, sleep apnea, kidney stones and PSH hernia repair, partial colon resection, h/o RBL x 2 (1990s, and recently w/ Dr. Deleon) referred to Dr. Villegas by Dr. Aime Deleon for workup of heavy rectal bleeding, found to have pancolonic diverticulosis, presented to [facility] on 4/25 for operative management now s/p diagnotic laparoscopy converted to open total colectomy (4/25) complicated by anastomotic leak requiring RTOR for exploratory laparotomy with open creation of diverting loop ileostomy (5/2).    On 4/25, patient underwent planned diagnostic laparoscopy converted to open total colectomy due to extensive adhesions. Post-operatively, patient progressed well, tolerated diet, was passing flatus and having bowel movements. On 4/28, patient has decrease PO intake 2/2 decreased appetite with multiple loose bowel movements. Despite lomotil and observation, patient's pain continued to worsen. CT scan on 5/1, was only significant for hairpin-like curve noted approximately 5 to 7 cm proximal to the anastomosis. Repeat CT scan was significant for intraperitoneal ascitic fluid and he underwent IR drainage (12Fr, 250cc serous fluid). Overnight, drain output became bilious and patient underwent exploratory laparotomy with open creation of diverting loop ileostomy (5/2), which was significant for a small 1mm leak at the anastomosis. The remainder of the patient's hospital course was uncomplicated. Diet was advanced as tolerated and pain was well controlled on medication. Prior to discharge the patient had an episode of orthostatic hypotension which resolved after adequate intravenous hydration and repletion. On day of discharge, patient had stable vital signs, was tolerating diet, voiding without assistance, and pain was controlled. The patient has met benchmarks for discharge with plans to follow up with Dr. Villegas in 1-2 weeks.  Patient will be sent home with ostomy care.

## 2023-05-05 NOTE — DISCHARGE NOTE PROVIDER - CARE PROVIDER_API CALL
Onesimo Villegas)  ColonRectal Surgery; Surgery  [facility], 2nd Floor  [place], [place] [address]  Phone: [phone]  Fax: [phone]  Follow Up Time:

## 2023-05-05 NOTE — DISCHARGE NOTE NURSING/CASE MANAGEMENT/SOCIAL WORK - NSDCPEFALRISK_GEN_ALL_CORE
For information on Fall & Injury Prevention, visit: [email or web address] OR  [email or web address] OR  https://www.cdc.gov/steadi/patient.html

## 2023-05-05 NOTE — DISCHARGE NOTE PROVIDER - REASON FOR ADMISSION
56 y/o male patient with extensive colonic diverticulitis and rectal bleeding; comes in for colorectal surgery management and admission to surgery valdovinos for perioperative care.

## 2023-05-06 LAB
ANION GAP SERPL CALC-SCNC: 10 MMOL/L — SIGNIFICANT CHANGE UP (ref 5–17)
BUN SERPL-MCNC: 12 MG/DL — SIGNIFICANT CHANGE UP (ref 7–23)
CALCIUM SERPL-MCNC: 9.3 MG/DL — SIGNIFICANT CHANGE UP (ref 8.4–10.5)
CHLORIDE SERPL-SCNC: 107 MMOL/L — SIGNIFICANT CHANGE UP (ref 96–108)
CO2 SERPL-SCNC: 19 MMOL/L — LOW (ref 22–31)
CREAT SERPL-MCNC: 0.81 MG/DL — SIGNIFICANT CHANGE UP (ref 0.5–1.3)
EGFR: 102 ML/MIN/1.73M2 — SIGNIFICANT CHANGE UP
GLUCOSE SERPL-MCNC: 119 MG/DL — HIGH (ref 70–99)
HCT VFR BLD CALC: 30.4 % — LOW (ref 39–50)
HGB BLD-MCNC: 9 G/DL — LOW (ref 13–17)
MAGNESIUM SERPL-MCNC: 2.2 MG/DL — SIGNIFICANT CHANGE UP (ref 1.6–2.6)
MCHC RBC-ENTMCNC: 21.4 PG — LOW (ref 27–34)
MCHC RBC-ENTMCNC: 29.6 GM/DL — LOW (ref 32–36)
MCV RBC AUTO: 72.4 FL — LOW (ref 80–100)
NRBC # BLD: 0 /100 WBCS — SIGNIFICANT CHANGE UP (ref 0–0)
PHOSPHATE SERPL-MCNC: 3.7 MG/DL — SIGNIFICANT CHANGE UP (ref 2.5–4.5)
PLATELET # BLD AUTO: 701 K/UL — HIGH (ref 150–400)
POTASSIUM SERPL-MCNC: 3.9 MMOL/L — SIGNIFICANT CHANGE UP (ref 3.5–5.3)
POTASSIUM SERPL-SCNC: 3.9 MMOL/L — SIGNIFICANT CHANGE UP (ref 3.5–5.3)
RBC # BLD: 4.2 M/UL — SIGNIFICANT CHANGE UP (ref 4.2–5.8)
RBC # FLD: 21.6 % — HIGH (ref 10.3–14.5)
SODIUM SERPL-SCNC: 136 MMOL/L — SIGNIFICANT CHANGE UP (ref 135–145)
WBC # BLD: 8.59 K/UL — SIGNIFICANT CHANGE UP (ref 3.8–10.5)
WBC # FLD AUTO: 8.59 K/UL — SIGNIFICANT CHANGE UP (ref 3.8–10.5)

## 2023-05-06 RX ORDER — SODIUM CHLORIDE 9 MG/ML
500 INJECTION, SOLUTION INTRAVENOUS ONCE
Refills: 0 | Status: COMPLETED | OUTPATIENT
Start: 2023-05-06 | End: 2023-05-06

## 2023-05-06 RX ADMIN — PANTOPRAZOLE SODIUM 40 MILLIGRAM(S): 20 TABLET, DELAYED RELEASE ORAL at 12:13

## 2023-05-06 RX ADMIN — Medication 650 MILLIGRAM(S): at 05:55

## 2023-05-06 RX ADMIN — SODIUM CHLORIDE 1000 MILLILITER(S): 9 INJECTION, SOLUTION INTRAVENOUS at 19:07

## 2023-05-06 RX ADMIN — Medication 5 MILLIGRAM(S): at 23:49

## 2023-05-06 RX ADMIN — HEPARIN SODIUM 5000 UNIT(S): 5000 INJECTION INTRAVENOUS; SUBCUTANEOUS at 04:55

## 2023-05-06 RX ADMIN — Medication 650 MILLIGRAM(S): at 20:07

## 2023-05-06 RX ADMIN — Medication 650 MILLIGRAM(S): at 12:19

## 2023-05-06 RX ADMIN — HEPARIN SODIUM 5000 UNIT(S): 5000 INJECTION INTRAVENOUS; SUBCUTANEOUS at 13:23

## 2023-05-06 RX ADMIN — Medication 650 MILLIGRAM(S): at 04:55

## 2023-05-06 RX ADMIN — Medication 650 MILLIGRAM(S): at 13:19

## 2023-05-06 RX ADMIN — Medication 650 MILLIGRAM(S): at 19:08

## 2023-05-06 RX ADMIN — HEPARIN SODIUM 5000 UNIT(S): 5000 INJECTION INTRAVENOUS; SUBCUTANEOUS at 21:22

## 2023-05-06 RX ADMIN — PIPERACILLIN AND TAZOBACTAM 25 GRAM(S): 4; .5 INJECTION, POWDER, LYOPHILIZED, FOR SOLUTION INTRAVENOUS at 04:55

## 2023-05-06 RX ADMIN — Medication 145 MILLIGRAM(S): at 12:13

## 2023-05-06 RX ADMIN — Medication 360 MILLIGRAM(S): at 05:47

## 2023-05-06 NOTE — PROGRESS NOTE ADULT - SUBJECTIVE AND OBJECTIVE BOX
STATUS POST:    4/25: diagnostic laparoscopy converted to open total colectomy  5/2: Exploratory laparotomy with open creation of diverting loop ileostomy    ON: slava LFD. gas and stool in bag. walked around the unit.     SUBJECTIVE: Pt seen and examined at bedside this am by surgery team. No acute complaints. Tolerating LFD. Ostomy functioning.     Vital Signs Last 24 Hrs  T(C): 36.8 (06 May 2023 04:56), Max: 36.8 (06 May 2023 04:56)  T(F): 98.2 (06 May 2023 04:56), Max: 98.2 (06 May 2023 04:56)  HR: 65 (06 May 2023 04:56) (65 - 81)  BP: 114/77 (06 May 2023 04:56) (111/74 - 124/82)  BP(mean): --  RR: 18 (06 May 2023 04:56) (16 - 18)  SpO2: 94% (06 May 2023 04:56) (94% - 94%)    Parameters below as of 06 May 2023 04:56  Patient On (Oxygen Delivery Method): room air        PHYSICAL EXAM:   Gen: Awake, alert, NAD, resting comfortably   CV: RRR  Pulm: no respiratory distress on RA  Abd: soft, ND, NTTP, no rebound or guarding, ostomy functioning, DAVE serous   Ext: WWP    I&O's Detail    05 May 2023 07:01  -  06 May 2023 07:00  --------------------------------------------------------  IN:    IV PiggyBack: 175 mL    Lactated Ringers Bolus: 1000 mL    Oral Fluid: 450 mL  Total IN: 1625 mL    OUT:    Bulb (mL): 165 mL    Ileostomy (mL): 1125 mL    Voided (mL): 1450 mL  Total OUT: 2740 mL    Total NET: -1115 mL      06 May 2023 07:01  -  06 May 2023 08:44  --------------------------------------------------------  IN:    IV PiggyBack: 25 mL  Total IN: 25 mL    OUT:    Ileostomy (mL): 50 mL  Total OUT: 50 mL    Total NET: -25 mL          LABS:                        9.0    8.59  )-----------( 701      ( 06 May 2023 07:34 )             30.4     05-06    136  |  107  |  12  ----------------------------<  119<H>  3.9   |  19<L>  |  0.81    Ca    9.3      06 May 2023 07:34  Phos  3.7     05-06  Mg     2.2     05-06          CAPILLARY BLOOD GLUCOSE          RADIOLOGY & ADDITIONAL STUDIES:

## 2023-05-06 NOTE — PROGRESS NOTE ADULT - SUBJECTIVE AND OBJECTIVE BOX
Tolerating diet.  VSS  Ileostomy 1100 /24 hours  more formed    ABd- soft  NORA- serous    WBC 8  Recovering well.  Encourage ambulation  ET education  DC nora  DC planning   DC abx

## 2023-05-07 LAB
ANION GAP SERPL CALC-SCNC: 10 MMOL/L — SIGNIFICANT CHANGE UP (ref 5–17)
BUN SERPL-MCNC: 15 MG/DL — SIGNIFICANT CHANGE UP (ref 7–23)
CALCIUM SERPL-MCNC: 8.3 MG/DL — LOW (ref 8.4–10.5)
CHLORIDE SERPL-SCNC: 109 MMOL/L — HIGH (ref 96–108)
CO2 SERPL-SCNC: 19 MMOL/L — LOW (ref 22–31)
CREAT SERPL-MCNC: 0.76 MG/DL — SIGNIFICANT CHANGE UP (ref 0.5–1.3)
EGFR: 104 ML/MIN/1.73M2 — SIGNIFICANT CHANGE UP
GLUCOSE SERPL-MCNC: 102 MG/DL — HIGH (ref 70–99)
HCT VFR BLD CALC: 30.3 % — LOW (ref 39–50)
HGB BLD-MCNC: 9.2 G/DL — LOW (ref 13–17)
MAGNESIUM SERPL-MCNC: 2.1 MG/DL — SIGNIFICANT CHANGE UP (ref 1.6–2.6)
MCHC RBC-ENTMCNC: 21.9 PG — LOW (ref 27–34)
MCHC RBC-ENTMCNC: 30.4 GM/DL — LOW (ref 32–36)
MCV RBC AUTO: 72.1 FL — LOW (ref 80–100)
NRBC # BLD: 0 /100 WBCS — SIGNIFICANT CHANGE UP (ref 0–0)
PHOSPHATE SERPL-MCNC: 2.7 MG/DL — SIGNIFICANT CHANGE UP (ref 2.5–4.5)
PLATELET # BLD AUTO: 794 K/UL — HIGH (ref 150–400)
POTASSIUM SERPL-MCNC: 4.1 MMOL/L — SIGNIFICANT CHANGE UP (ref 3.5–5.3)
POTASSIUM SERPL-SCNC: 4.1 MMOL/L — SIGNIFICANT CHANGE UP (ref 3.5–5.3)
RBC # BLD: 4.2 M/UL — SIGNIFICANT CHANGE UP (ref 4.2–5.8)
RBC # FLD: 22 % — HIGH (ref 10.3–14.5)
SODIUM SERPL-SCNC: 138 MMOL/L — SIGNIFICANT CHANGE UP (ref 135–145)
WBC # BLD: 10.18 K/UL — SIGNIFICANT CHANGE UP (ref 3.8–10.5)
WBC # FLD AUTO: 10.18 K/UL — SIGNIFICANT CHANGE UP (ref 3.8–10.5)

## 2023-05-07 RX ORDER — SODIUM CHLORIDE 9 MG/ML
500 INJECTION, SOLUTION INTRAVENOUS ONCE
Refills: 0 | Status: COMPLETED | OUTPATIENT
Start: 2023-05-07 | End: 2023-05-07

## 2023-05-07 RX ORDER — DIPHENOXYLATE HCL/ATROPINE 2.5-.025MG
1 TABLET ORAL EVERY 12 HOURS
Refills: 0 | Status: DISCONTINUED | OUTPATIENT
Start: 2023-05-07 | End: 2023-05-08

## 2023-05-07 RX ADMIN — SODIUM CHLORIDE 1000 MILLILITER(S): 9 INJECTION, SOLUTION INTRAVENOUS at 23:12

## 2023-05-07 RX ADMIN — Medication 650 MILLIGRAM(S): at 19:44

## 2023-05-07 RX ADMIN — Medication 650 MILLIGRAM(S): at 19:18

## 2023-05-07 RX ADMIN — Medication 650 MILLIGRAM(S): at 03:07

## 2023-05-07 RX ADMIN — Medication 650 MILLIGRAM(S): at 02:07

## 2023-05-07 RX ADMIN — HEPARIN SODIUM 5000 UNIT(S): 5000 INJECTION INTRAVENOUS; SUBCUTANEOUS at 13:34

## 2023-05-07 RX ADMIN — Medication 145 MILLIGRAM(S): at 12:35

## 2023-05-07 RX ADMIN — Medication 650 MILLIGRAM(S): at 08:18

## 2023-05-07 RX ADMIN — Medication 650 MILLIGRAM(S): at 13:34

## 2023-05-07 RX ADMIN — HEPARIN SODIUM 5000 UNIT(S): 5000 INJECTION INTRAVENOUS; SUBCUTANEOUS at 05:21

## 2023-05-07 RX ADMIN — Medication 360 MILLIGRAM(S): at 05:21

## 2023-05-07 RX ADMIN — Medication 650 MILLIGRAM(S): at 14:37

## 2023-05-07 RX ADMIN — PANTOPRAZOLE SODIUM 40 MILLIGRAM(S): 20 TABLET, DELAYED RELEASE ORAL at 12:35

## 2023-05-07 RX ADMIN — Medication 1 TABLET(S): at 18:09

## 2023-05-07 RX ADMIN — SODIUM CHLORIDE 1000 MILLILITER(S): 9 INJECTION, SOLUTION INTRAVENOUS at 10:46

## 2023-05-07 RX ADMIN — HEPARIN SODIUM 5000 UNIT(S): 5000 INJECTION INTRAVENOUS; SUBCUTANEOUS at 21:17

## 2023-05-07 RX ADMIN — Medication 650 MILLIGRAM(S): at 07:33

## 2023-05-07 NOTE — PROGRESS NOTE ADULT - SUBJECTIVE AND OBJECTIVE BOX
INTERVAL HPI/OVERNIGHT EVENTS:    STATUS POST:      POST OPERATIVE DAY #:     SUBJECTIVE:      diltiazem    milliGRAM(s) Oral daily  heparin   Injectable 5000 Unit(s) SubCutaneous every 8 hours      Vital Signs Last 24 Hrs  T(C): 37.3 (07 May 2023 05:07), Max: 37.4 (06 May 2023 17:40)  T(F): 99.2 (07 May 2023 05:07), Max: 99.3 (06 May 2023 17:40)  HR: 86 (07 May 2023 05:07) (71 - 86)  BP: 154/90 (07 May 2023 05:07) (108/66 - 154/90)  BP(mean): --  RR: 18 (07 May 2023 05:07) (16 - 18)  SpO2: 94% (07 May 2023 05:07) (94% - 96%)    Parameters below as of 07 May 2023 05:07  Patient On (Oxygen Delivery Method): room air      I&O's Detail    05 May 2023 07:01  -  06 May 2023 07:00  --------------------------------------------------------  IN:    IV PiggyBack: 175 mL    Lactated Ringers Bolus: 1000 mL    Oral Fluid: 450 mL  Total IN: 1625 mL    OUT:    Bulb (mL): 165 mL    Ileostomy (mL): 1125 mL    Voided (mL): 1450 mL  Total OUT: 2740 mL    Total NET: -1115 mL      06 May 2023 07:01  -  07 May 2023 06:25  --------------------------------------------------------  IN:    IV PiggyBack: 25 mL    Lactated Ringers Bolus: 500 mL    Oral Fluid: 780 mL  Total IN: 1305 mL    OUT:    Bulb (mL): 30 mL    Ileostomy (mL): 1890 mL    Voided (mL): 1125 mL  Total OUT: 3045 mL    Total NET: -1740 mL          General: NAD, resting comfortably in bed  C/V: NSR  Pulm: Nonlabored breathing, no respiratory distress  Abd: soft, NT/ND.  Extrem: WWP, no edema, SCDs in place  Drains:  [place]:      LABS:                        9.0    8.59  )-----------( 701      ( 06 May 2023 07:34 )             30.4     05-06    136  |  107  |  12  ----------------------------<  119<H>  3.9   |  19<L>  |  0.81    Ca    9.3      06 May 2023 07:34  Phos  3.7     05-06  Mg     2.2     05-06            RADIOLOGY & ADDITIONAL STUDIES:   INTERVAL HPI/OVERNIGHT EVENTS: KRISHNA.    STATUS POST:   4/25: diagnostic laparoscopy converted to open total colectomy  5/2: Exploratory laparotomy with open creation of diverting loop ileostomy    SUBJECTIVE: Patient seen and examined at bedside. He states his pain is unchanged but well-controlled, able to slava PO without n/v. Denies cp, sob.       diltiazem    milliGRAM(s) Oral daily  heparin   Injectable 5000 Unit(s) SubCutaneous every 8 hours      Vital Signs Last 24 Hrs  T(C): 37.3 (07 May 2023 05:07), Max: 37.4 (06 May 2023 17:40)  T(F): 99.2 (07 May 2023 05:07), Max: 99.3 (06 May 2023 17:40)  HR: 86 (07 May 2023 05:07) (71 - 86)  BP: 154/90 (07 May 2023 05:07) (108/66 - 154/90)  BP(mean): --  RR: 18 (07 May 2023 05:07) (16 - 18)  SpO2: 94% (07 May 2023 05:07) (94% - 96%)    Parameters below as of 07 May 2023 05:07  Patient On (Oxygen Delivery Method): room air      I&O's Detail    05 May 2023 07:01  -  06 May 2023 07:00  --------------------------------------------------------  IN:    IV PiggyBack: 175 mL    Lactated Ringers Bolus: 1000 mL    Oral Fluid: 450 mL  Total IN: 1625 mL    OUT:    Bulb (mL): 165 mL    Ileostomy (mL): 1125 mL    Voided (mL): 1450 mL  Total OUT: 2740 mL    Total NET: -1115 mL      06 May 2023 07:01  -  07 May 2023 06:25  --------------------------------------------------------  IN:    IV PiggyBack: 25 mL    Lactated Ringers Bolus: 500 mL    Oral Fluid: 780 mL  Total IN: 1305 mL    OUT:    Bulb (mL): 30 mL    Ileostomy (mL): 1890 mL    Voided (mL): 1125 mL  Total OUT: 3045 mL    Total NET: -1740 mL          General: NAD, resting comfortably in bed  C/V: NSR  Pulm: Nonlabored breathing, no respiratory distress  Abd: soft, NT/ND. Ostomy with gas and liquid stool in pouch. Midline laparotomy c/d/i.  Extrem: WWP, no edema, SCDs in place      LABS:                        9.0    8.59  )-----------( 701      ( 06 May 2023 07:34 )             30.4     05-06    136  |  107  |  12  ----------------------------<  119<H>  3.9   |  19<L>  |  0.81    Ca    9.3      06 May 2023 07:34  Phos  3.7     05-06  Mg     2.2     05-06            RADIOLOGY & ADDITIONAL STUDIES:

## 2023-05-08 PROBLEM — I10 ESSENTIAL (PRIMARY) HYPERTENSION: Chronic | Status: ACTIVE | Noted: 2023-04-24

## 2023-05-08 PROBLEM — E78.5 HYPERLIPIDEMIA, UNSPECIFIED: Chronic | Status: ACTIVE | Noted: 2023-04-24

## 2023-05-08 LAB
ANION GAP SERPL CALC-SCNC: 8 MMOL/L — SIGNIFICANT CHANGE UP (ref 5–17)
BUN SERPL-MCNC: 14 MG/DL — SIGNIFICANT CHANGE UP (ref 7–23)
CALCIUM SERPL-MCNC: 9.2 MG/DL — SIGNIFICANT CHANGE UP (ref 8.4–10.5)
CHLORIDE SERPL-SCNC: 106 MMOL/L — SIGNIFICANT CHANGE UP (ref 96–108)
CO2 SERPL-SCNC: 22 MMOL/L — SIGNIFICANT CHANGE UP (ref 22–31)
CREAT SERPL-MCNC: 0.82 MG/DL — SIGNIFICANT CHANGE UP (ref 0.5–1.3)
EGFR: 101 ML/MIN/1.73M2 — SIGNIFICANT CHANGE UP
GLUCOSE SERPL-MCNC: 92 MG/DL — SIGNIFICANT CHANGE UP (ref 70–99)
HCT VFR BLD CALC: 33.6 % — LOW (ref 39–50)
HGB BLD-MCNC: 10 G/DL — LOW (ref 13–17)
MAGNESIUM SERPL-MCNC: 1.9 MG/DL — SIGNIFICANT CHANGE UP (ref 1.6–2.6)
MCHC RBC-ENTMCNC: 21.7 PG — LOW (ref 27–34)
MCHC RBC-ENTMCNC: 29.8 GM/DL — LOW (ref 32–36)
MCV RBC AUTO: 72.9 FL — LOW (ref 80–100)
NRBC # BLD: 0 /100 WBCS — SIGNIFICANT CHANGE UP (ref 0–0)
PHOSPHATE SERPL-MCNC: 3.4 MG/DL — SIGNIFICANT CHANGE UP (ref 2.5–4.5)
PLATELET # BLD AUTO: 835 K/UL — HIGH (ref 150–400)
POTASSIUM SERPL-MCNC: 4.1 MMOL/L — SIGNIFICANT CHANGE UP (ref 3.5–5.3)
POTASSIUM SERPL-SCNC: 4.1 MMOL/L — SIGNIFICANT CHANGE UP (ref 3.5–5.3)
RBC # BLD: 4.61 M/UL — SIGNIFICANT CHANGE UP (ref 4.2–5.8)
RBC # FLD: 22.3 % — HIGH (ref 10.3–14.5)
SODIUM SERPL-SCNC: 136 MMOL/L — SIGNIFICANT CHANGE UP (ref 135–145)
WBC # BLD: 8.85 K/UL — SIGNIFICANT CHANGE UP (ref 3.8–10.5)
WBC # FLD AUTO: 8.85 K/UL — SIGNIFICANT CHANGE UP (ref 3.8–10.5)

## 2023-05-08 RX ORDER — MAGNESIUM SULFATE 500 MG/ML
1 VIAL (ML) INJECTION ONCE
Refills: 0 | Status: COMPLETED | OUTPATIENT
Start: 2023-05-08 | End: 2023-05-08

## 2023-05-08 RX ORDER — DIPHENOXYLATE HCL/ATROPINE 2.5-.025MG
1 TABLET ORAL ONCE
Refills: 0 | Status: DISCONTINUED | OUTPATIENT
Start: 2023-05-08 | End: 2023-05-08

## 2023-05-08 RX ORDER — SODIUM CHLORIDE 9 MG/ML
1000 INJECTION, SOLUTION INTRAVENOUS ONCE
Refills: 0 | Status: COMPLETED | OUTPATIENT
Start: 2023-05-08 | End: 2023-05-08

## 2023-05-08 RX ORDER — DIPHENOXYLATE HCL/ATROPINE 2.5-.025MG
2 TABLET ORAL EVERY 12 HOURS
Refills: 0 | Status: DISCONTINUED | OUTPATIENT
Start: 2023-05-08 | End: 2023-05-09

## 2023-05-08 RX ORDER — DIPHENOXYLATE HCL/ATROPINE 2.5-.025MG
2 TABLET ORAL
Qty: 84 | Refills: 0
Start: 2023-05-08 | End: 2023-05-28

## 2023-05-08 RX ADMIN — Medication 1 TABLET(S): at 08:41

## 2023-05-08 RX ADMIN — Medication 1 TABLET(S): at 05:41

## 2023-05-08 RX ADMIN — PANTOPRAZOLE SODIUM 40 MILLIGRAM(S): 20 TABLET, DELAYED RELEASE ORAL at 13:26

## 2023-05-08 RX ADMIN — Medication 100 GRAM(S): at 10:35

## 2023-05-08 RX ADMIN — Medication 5 MILLIGRAM(S): at 01:05

## 2023-05-08 RX ADMIN — HEPARIN SODIUM 5000 UNIT(S): 5000 INJECTION INTRAVENOUS; SUBCUTANEOUS at 21:03

## 2023-05-08 RX ADMIN — Medication 650 MILLIGRAM(S): at 02:04

## 2023-05-08 RX ADMIN — HEPARIN SODIUM 5000 UNIT(S): 5000 INJECTION INTRAVENOUS; SUBCUTANEOUS at 05:41

## 2023-05-08 RX ADMIN — Medication 650 MILLIGRAM(S): at 13:49

## 2023-05-08 RX ADMIN — HEPARIN SODIUM 5000 UNIT(S): 5000 INJECTION INTRAVENOUS; SUBCUTANEOUS at 13:27

## 2023-05-08 RX ADMIN — Medication 650 MILLIGRAM(S): at 20:18

## 2023-05-08 RX ADMIN — Medication 650 MILLIGRAM(S): at 07:44

## 2023-05-08 RX ADMIN — SODIUM CHLORIDE 1000 MILLILITER(S): 9 INJECTION, SOLUTION INTRAVENOUS at 17:13

## 2023-05-08 RX ADMIN — Medication 650 MILLIGRAM(S): at 08:44

## 2023-05-08 RX ADMIN — Medication 650 MILLIGRAM(S): at 21:18

## 2023-05-08 RX ADMIN — Medication 2 TABLET(S): at 18:21

## 2023-05-08 RX ADMIN — Medication 145 MILLIGRAM(S): at 13:27

## 2023-05-08 RX ADMIN — SODIUM CHLORIDE 1000 MILLILITER(S): 9 INJECTION, SOLUTION INTRAVENOUS at 13:27

## 2023-05-08 RX ADMIN — Medication 650 MILLIGRAM(S): at 14:49

## 2023-05-08 RX ADMIN — Medication 360 MILLIGRAM(S): at 05:41

## 2023-05-08 RX ADMIN — Medication 650 MILLIGRAM(S): at 01:04

## 2023-05-08 RX ADMIN — SODIUM CHLORIDE 1000 MILLILITER(S): 9 INJECTION, SOLUTION INTRAVENOUS at 19:26

## 2023-05-08 NOTE — PROVIDER CONTACT NOTE (MEDICATION) - BACKGROUND
56 yo male post op open total colectomy 4/25 and ex lap with ileostomy creation 5/2. Patient planned to get discharged today 5/8 and wanted to work with PT one more time before going home, patient states he has 30 stairs at home and wants to practice with help first

## 2023-05-08 NOTE — PROVIDER CONTACT NOTE (MEDICATION) - ASSESSMENT
patient vitals taken by PT during progression from sitting to standing to walking. laying /75 HR 82 BPM, sitting BP 96/69 HR 88 BPM, standing BP 77/52  BPM and patient oxygen dipped to 89% and patient became lightheaded. Patient sat back on bed with PT and BP increased to 144/77 with HR 65 BPM and O2 sat 96% after taking deep breaths. LUZ ROBIN contacted.

## 2023-05-08 NOTE — ADVANCED PRACTICE NURSE CONSULT - REASON FOR CONSULT
Ileostomy follow up 
Ileostomy follow up
Ostomy education follow up
New ostomy teaching
Ostomy education

## 2023-05-08 NOTE — ADVANCED PRACTICE NURSE CONSULT - RECOMMEDATIONS
Will follow for continued education. 
Will follow, plan for pouch change tomorrow  HHRN to follow at home  Supplies at bedside for discharge.   Home supply list placed on chart for prescription. 
Home health upon discharge set up by [facility]. 
Will continue to follow for education. 
Recommend HHRN upon discharge.  Discharge supplies at bedside  Home supply list on chart for prescription.  Provided written ileostomy booklet, step by step instructions and dietary instructions.

## 2023-05-08 NOTE — ADVANCED PRACTICE NURSE CONSULT - ASSESSMENT
Patient reports feeling well. Minimal pain, no n/v.  Tolerating low fiber diet. Nursing has been emptying per his request but he is able to do it. No pouch issues. High output (1.3L yesterday), on Lomotil.    Ileostomy: stoma red, protruding, 1 1/2".  Peristomal skin intact. Mucocutaneous junction intact.  Abdomen soft, nontender. Output thin, brown.   Midline incision with staples, few areas of superficial separation filling in.    DAVE drain is out, small scab to area.    Pouch changed with 2 piece 2 3/4" Aurora pouch and Adapt ring. Patient completed pouch change with minimal verbal assistance. Able to empty independently.   Reiterated education on : diet, hydration, home supplies, home health nurse.   Supplies have already been taken home by family members.  
Patient reports no issues with ileostomy. States he has been able to empty it independently.  He is agreeable to pouch change.  Mild abdominal pain, no nausea or vomiting.     Ileostomy: stoma red, protruding, 1 1/2". Peristomal skin intact without irritation. Mucocutaneous junction intact. Thin brown stool in pouch (1.5L out yesterday, surgery aware and repleting today).  Midline dressing CDI    Patient was able to change pouch with minimal hands on assist.  He plans to have wife do the cutting of the wafer as this is challenging for him.  We discussed emptying, pouch changes, diet, hydration, home health, supplies.  Discussed measuring output and when to call the surgeon.  All questions were answered.  
Patient reports pouch had to be changed overnight d/t too full and leaked. Discussed importance of emptying when no more than 1/2 full, emptying flatus.  Demonstrated how to burp pouch. Discussed diet and hydration extensively. Reviewed pouch changing and demonstrated emptying. Reminded patient that he needs to practice emptying and he is agreeable. Supplies at bedside.     Ileostomy: stoma red, budded. Liquid brown stool and flatus in pouch.    LLQ DAVE with serous drainage  Midline incision with staples, few scattered areas of separation (<1cm depth). Moderate serous drainage.  Dressing changed with NS moist gauze, dry gauze and tape. Patient reports surgery assessed this AM
Patient awake, alert and oriented. Denies pain or nausea/vomiting.  NG tube out.  On clear liquids without issues.    RLQ: Ileostomy with red round budded stoma, 1 1/2".  Peristomal skin intact. Liquid brown stool.  625cc out yesterday.  Pouch changed using 2 piece [place] 2 3/4 with Adapt Ring.  Abdomen soft, non tender.   Midline dressing CDI.   LLQ: DAVE drain with serous drainage.     Patient watched pouch change. He was able to practice opening and closing tail. He will practice emptying tonight.  He was actively involved in care and asked appropriate questions.  We discussed supplies, home health care, pouch emptying, pouch changing. Extra supplies at bedside. 
Per chart review, 58 y/o male patient with extensive colonic diverticulitis and rectal bleeding; comes in for colorectal surgery management and admission to surgery [facility]inos for perioperative care. Patient is now s/p Exploratory laparotomy with open creation of diverting loop ileostomy last night.     Patient awake, alert and oriented. RLQ ileostomy with red, budded stoma. No flatus or stool. Abdomen slightly distended, appropriately tender.  NG tube remains in place.   Initiated verbal education with patient - discussed pouch changes, emptying pouch.  Patient was able to visualize pouching system and asked appropriate questions.  Extra 2 3/4" supplies placed at bedside

## 2023-05-08 NOTE — PROVIDER CONTACT NOTE (MEDICATION) - ACTION/TREATMENT ORDERED:
LUZ ROBIN ordered 1L fluid bolus for patient. Patient in bed with bed alarm on and aware to call for assistance

## 2023-05-08 NOTE — PROGRESS NOTE ADULT - SUBJECTIVE AND OBJECTIVE BOX
SUBJECTIVE: Pt seen and examined at bedside with chief. Pt denies any complaints. Pain well controlled. Tolerating diet without N/V.     MEDICATIONS  (STANDING):  diltiazem    milliGRAM(s) Oral daily  diphenoxylate/atropine 2 Tablet(s) Oral every 12 hours  diphenoxylate/atropine 1 Tablet(s) Oral once  fenofibrate Tablet 145 milliGRAM(s) Oral daily  heparin   Injectable 5000 Unit(s) SubCutaneous every 8 hours  melatonin 5 milliGRAM(s) Oral at bedtime  pantoprazole  Injectable 40 milliGRAM(s) IV Push daily    MEDICATIONS  (PRN):  acetaminophen     Tablet .. 650 milliGRAM(s) Oral every 6 hours PRN Mild Pain (1 - 3)  naloxone Injectable 0.1 milliGRAM(s) IV Push every 3 minutes PRN For ANY of the following changes in patient status:  A. RR LESS THAN 10 breaths per minute, B. Oxygen saturation LESS THAN 90%, C. Sedation score of 6  ondansetron Injectable 4 milliGRAM(s) IV Push every 6 hours PRN Nausea and/or Vomiting  oxyCODONE    IR 2.5 milliGRAM(s) Oral every 6 hours PRN Moderate Pain (4 - 6)  oxyCODONE    IR 5 milliGRAM(s) Oral every 6 hours PRN Severe Pain (7 - 10)      Vital Signs Last 24 Hrs  T(C): 36.7 (08 May 2023 04:57), Max: 37 (07 May 2023 16:29)  T(F): 98 (08 May 2023 04:57), Max: 98.6 (07 May 2023 16:29)  HR: 76 (08 May 2023 04:57) (76 - 88)  BP: 115/79 (08 May 2023 04:57) (110/69 - 129/84)  BP(mean): --  RR: 18 (08 May 2023 04:57) (17 - 18)  SpO2: 95% (08 May 2023 04:57) (94% - 96%)    Parameters below as of 08 May 2023 04:57  Patient On (Oxygen Delivery Method): room air        PHYSICAL EXAM:      Constitutional: A&Ox3    Respiratory: non labored breathing, no respiratory distress    Cardiovascular: NSR, RRR    Gastrointestinal: soft ND, NT                 Incision: CDI w/ dry gauze over it, stoma pink and patent with stool in bag.     Genitourinary: voiding     Extremities: (-) edema                  I&O's Detail    07 May 2023 07:01  -  08 May 2023 07:00  --------------------------------------------------------  IN:    Lactated Ringers Bolus: 1000 mL    Oral Fluid: 980 mL  Total IN: 1980 mL    OUT:    Ileostomy (mL): 1395 mL    Voided (mL): 1675 mL  Total OUT: 3070 mL    Total NET: -1090 mL          LABS:                        10.0   8.85  )-----------( 835      ( 08 May 2023 05:30 )             33.6     05-07    138  |  109<H>  |  15  ----------------------------<  102<H>  4.1   |  19<L>  |  0.76    Ca    8.3<L>      07 May 2023 05:30  Phos  2.7     05-07  Mg     2.1     05-07            RADIOLOGY & ADDITIONAL STUDIES:

## 2023-05-09 VITALS
SYSTOLIC BLOOD PRESSURE: 123 MMHG | RESPIRATION RATE: 17 BRPM | TEMPERATURE: 98 F | HEART RATE: 90 BPM | OXYGEN SATURATION: 96 % | DIASTOLIC BLOOD PRESSURE: 82 MMHG

## 2023-05-09 LAB
ANION GAP SERPL CALC-SCNC: 9 MMOL/L — SIGNIFICANT CHANGE UP (ref 5–17)
BUN SERPL-MCNC: 12 MG/DL — SIGNIFICANT CHANGE UP (ref 7–23)
CALCIUM SERPL-MCNC: 9.3 MG/DL — SIGNIFICANT CHANGE UP (ref 8.4–10.5)
CHLORIDE SERPL-SCNC: 107 MMOL/L — SIGNIFICANT CHANGE UP (ref 96–108)
CO2 SERPL-SCNC: 21 MMOL/L — LOW (ref 22–31)
CREAT SERPL-MCNC: 0.86 MG/DL — SIGNIFICANT CHANGE UP (ref 0.5–1.3)
EGFR: 100 ML/MIN/1.73M2 — SIGNIFICANT CHANGE UP
GLUCOSE SERPL-MCNC: 99 MG/DL — SIGNIFICANT CHANGE UP (ref 70–99)
HCT VFR BLD CALC: 30.6 % — LOW (ref 39–50)
HGB BLD-MCNC: 9.1 G/DL — LOW (ref 13–17)
MAGNESIUM SERPL-MCNC: 2 MG/DL — SIGNIFICANT CHANGE UP (ref 1.6–2.6)
MCHC RBC-ENTMCNC: 21.9 PG — LOW (ref 27–34)
MCHC RBC-ENTMCNC: 29.7 GM/DL — LOW (ref 32–36)
MCV RBC AUTO: 73.6 FL — LOW (ref 80–100)
NRBC # BLD: 0 /100 WBCS — SIGNIFICANT CHANGE UP (ref 0–0)
PHOSPHATE SERPL-MCNC: 3.6 MG/DL — SIGNIFICANT CHANGE UP (ref 2.5–4.5)
PLATELET # BLD AUTO: 798 K/UL — HIGH (ref 150–400)
POTASSIUM SERPL-MCNC: 4 MMOL/L — SIGNIFICANT CHANGE UP (ref 3.5–5.3)
POTASSIUM SERPL-SCNC: 4 MMOL/L — SIGNIFICANT CHANGE UP (ref 3.5–5.3)
RBC # BLD: 4.16 M/UL — LOW (ref 4.2–5.8)
RBC # FLD: 21.5 % — HIGH (ref 10.3–14.5)
SODIUM SERPL-SCNC: 137 MMOL/L — SIGNIFICANT CHANGE UP (ref 135–145)
WBC # BLD: 7.72 K/UL — SIGNIFICANT CHANGE UP (ref 3.8–10.5)
WBC # FLD AUTO: 7.72 K/UL — SIGNIFICANT CHANGE UP (ref 3.8–10.5)

## 2023-05-09 PROCEDURE — 88307 TISSUE EXAM BY PATHOLOGIST: CPT

## 2023-05-09 PROCEDURE — 86901 BLOOD TYPING SEROLOGIC RH(D): CPT

## 2023-05-09 PROCEDURE — 36415 COLL VENOUS BLD VENIPUNCTURE: CPT

## 2023-05-09 PROCEDURE — 74177 CT ABD & PELVIS W/CONTRAST: CPT

## 2023-05-09 PROCEDURE — 74176 CT ABD & PELVIS W/O CONTRAST: CPT

## 2023-05-09 PROCEDURE — C1729: CPT

## 2023-05-09 PROCEDURE — 82962 GLUCOSE BLOOD TEST: CPT

## 2023-05-09 PROCEDURE — 97530 THERAPEUTIC ACTIVITIES: CPT

## 2023-05-09 PROCEDURE — 71045 X-RAY EXAM CHEST 1 VIEW: CPT

## 2023-05-09 PROCEDURE — 82247 BILIRUBIN TOTAL: CPT

## 2023-05-09 PROCEDURE — 87075 CULTR BACTERIA EXCEPT BLOOD: CPT

## 2023-05-09 PROCEDURE — 82150 ASSAY OF AMYLASE: CPT

## 2023-05-09 PROCEDURE — 85610 PROTHROMBIN TIME: CPT

## 2023-05-09 PROCEDURE — P9045: CPT

## 2023-05-09 PROCEDURE — 88304 TISSUE EXAM BY PATHOLOGIST: CPT

## 2023-05-09 PROCEDURE — C1769: CPT

## 2023-05-09 PROCEDURE — 85730 THROMBOPLASTIN TIME PARTIAL: CPT

## 2023-05-09 PROCEDURE — 87205 SMEAR GRAM STAIN: CPT

## 2023-05-09 PROCEDURE — 86803 HEPATITIS C AB TEST: CPT

## 2023-05-09 PROCEDURE — 49406 IMAGE CATH FLUID PERI/RETRO: CPT

## 2023-05-09 PROCEDURE — 84100 ASSAY OF PHOSPHORUS: CPT

## 2023-05-09 PROCEDURE — C1889: CPT

## 2023-05-09 PROCEDURE — 97161 PT EVAL LOW COMPLEX 20 MIN: CPT

## 2023-05-09 PROCEDURE — 86900 BLOOD TYPING SEROLOGIC ABO: CPT

## 2023-05-09 PROCEDURE — 85027 COMPLETE CBC AUTOMATED: CPT

## 2023-05-09 PROCEDURE — 80048 BASIC METABOLIC PNL TOTAL CA: CPT

## 2023-05-09 PROCEDURE — 87070 CULTURE OTHR SPECIMN AEROBIC: CPT

## 2023-05-09 PROCEDURE — S2900: CPT

## 2023-05-09 PROCEDURE — 80053 COMPREHEN METABOLIC PANEL: CPT

## 2023-05-09 PROCEDURE — 83735 ASSAY OF MAGNESIUM: CPT

## 2023-05-09 PROCEDURE — 87186 SC STD MICRODIL/AGAR DIL: CPT

## 2023-05-09 PROCEDURE — 86850 RBC ANTIBODY SCREEN: CPT

## 2023-05-09 RX ORDER — OXYCODONE HYDROCHLORIDE 5 MG/1
2.5 TABLET ORAL ONCE
Refills: 0 | Status: DISCONTINUED | OUTPATIENT
Start: 2023-05-09 | End: 2023-05-09

## 2023-05-09 RX ORDER — DIPHENOXYLATE HCL/ATROPINE 2.5-.025MG
2 TABLET ORAL
Qty: 100 | Refills: 0
Start: 2023-05-09

## 2023-05-09 RX ADMIN — Medication 5 MILLIGRAM(S): at 02:19

## 2023-05-09 RX ADMIN — Medication 2 TABLET(S): at 05:52

## 2023-05-09 RX ADMIN — Medication 650 MILLIGRAM(S): at 02:19

## 2023-05-09 RX ADMIN — Medication 650 MILLIGRAM(S): at 03:19

## 2023-05-09 RX ADMIN — Medication 650 MILLIGRAM(S): at 09:50

## 2023-05-09 RX ADMIN — Medication 145 MILLIGRAM(S): at 12:22

## 2023-05-09 RX ADMIN — OXYCODONE HYDROCHLORIDE 2.5 MILLIGRAM(S): 5 TABLET ORAL at 15:19

## 2023-05-09 RX ADMIN — HEPARIN SODIUM 5000 UNIT(S): 5000 INJECTION INTRAVENOUS; SUBCUTANEOUS at 05:52

## 2023-05-09 RX ADMIN — Medication 360 MILLIGRAM(S): at 05:52

## 2023-05-09 RX ADMIN — PANTOPRAZOLE SODIUM 40 MILLIGRAM(S): 20 TABLET, DELAYED RELEASE ORAL at 12:21

## 2023-05-09 RX ADMIN — HEPARIN SODIUM 5000 UNIT(S): 5000 INJECTION INTRAVENOUS; SUBCUTANEOUS at 12:21

## 2023-05-09 RX ADMIN — Medication 650 MILLIGRAM(S): at 08:49

## 2023-05-09 NOTE — PROGRESS NOTE ADULT - SUBJECTIVE AND OBJECTIVE BOX
INTERVAL HPI/OVERNIGHT EVENTS: 1L LR bolus, KRISHNA    STATUS POST:  Exploratory laparotomy with open creation of diverting loop ileostomy    POST OPERATIVE DAY #: 7    SUBJECTIVE:  pt seen at bedside, feeling good. denies abdominal pain, nausea, vomiting. denies episodes of dizziness    MEDICATIONS  (STANDING):  diltiazem    milliGRAM(s) Oral daily  diphenoxylate/atropine 2 Tablet(s) Oral every 12 hours  fenofibrate Tablet 145 milliGRAM(s) Oral daily  heparin   Injectable 5000 Unit(s) SubCutaneous every 8 hours  melatonin 5 milliGRAM(s) Oral at bedtime  pantoprazole  Injectable 40 milliGRAM(s) IV Push daily    MEDICATIONS  (PRN):  acetaminophen     Tablet .. 650 milliGRAM(s) Oral every 6 hours PRN Mild Pain (1 - 3)  naloxone Injectable 0.1 milliGRAM(s) IV Push every 3 minutes PRN For ANY of the following changes in patient status:  A. RR LESS THAN 10 breaths per minute, B. Oxygen saturation LESS THAN 90%, C. Sedation score of 6  ondansetron Injectable 4 milliGRAM(s) IV Push every 6 hours PRN Nausea and/or Vomiting  oxyCODONE    IR 2.5 milliGRAM(s) Oral every 6 hours PRN Moderate Pain (4 - 6)      Vital Signs Last 24 Hrs  T(C): 36.7 (09 May 2023 05:54), Max: 37.3 (08 May 2023 20:25)  T(F): 98.1 (09 May 2023 05:54), Max: 99.1 (08 May 2023 20:25)  HR: 77 (09 May 2023 05:54) (62 - 89)  BP: 136/83 (09 May 2023 05:54) (114/64 - 144/77)  BP(mean): 99 (08 May 2023 11:57) (99 - 99)  RR: 18 (09 May 2023 05:54) (17 - 18)  SpO2: 94% (09 May 2023 05:54) (94% - 96%)    Parameters below as of 09 May 2023 05:54  Patient On (Oxygen Delivery Method): room air        PHYSICAL EXAM:      Constitutional: A&Ox3    Respiratory: non labored breathing, no respiratory distress    Cardiovascular: NSR, RRR    Gastrointestinal: soft, nontender, nondistended, incisions c/d/i    Extremities: (-) edema                  I&O's Detail    08 May 2023 07:01  -  09 May 2023 07:00  --------------------------------------------------------  IN:    Lactated Ringers Bolus: 3000 mL    Oral Fluid: 1120 mL  Total IN: 4120 mL    OUT:    Ileostomy (mL): 800 mL    Voided (mL): 2750 mL  Total OUT: 3550 mL    Total NET: 570 mL          LABS:                        10.0   8.85  )-----------( 835      ( 08 May 2023 05:30 )             33.6     05-08    136  |  106  |  14  ----------------------------<  92  4.1   |  22  |  0.82    Ca    9.2      08 May 2023 05:30  Phos  3.4     05-08  Mg     1.9     05-08            RADIOLOGY & ADDITIONAL STUDIES:

## 2023-05-09 NOTE — PROGRESS NOTE ADULT - SUBJECTIVE AND OBJECTIVE BOX
INTERVAL HPI/OVERNIGHT EVENTS:     STATUS POST: 4/25: diagnostic laparoscopy converted to open total colectomy  5/2: Exploratory laparotomy with open creation of diverting loop ileostomy     POST OPERATIVE DAY #: 7    SUBJECTIVE:      diltiazem    milliGRAM(s) Oral daily  heparin   Injectable 5000 Unit(s) SubCutaneous every 8 hours      Vital Signs Last 24 Hrs  T(C): 36.7 (09 May 2023 05:54), Max: 37.3 (08 May 2023 20:25)  T(F): 98.1 (09 May 2023 05:54), Max: 99.1 (08 May 2023 20:25)  HR: 77 (09 May 2023 05:54) (62 - 89)  BP: 136/83 (09 May 2023 05:54) (114/64 - 144/77)  BP(mean): 99 (08 May 2023 11:57) (99 - 99)  RR: 18 (09 May 2023 05:54) (17 - 18)  SpO2: 94% (09 May 2023 05:54) (94% - 96%)    Parameters below as of 09 May 2023 05:54  Patient On (Oxygen Delivery Method): room air      I&O's Detail    08 May 2023 07:01  -  09 May 2023 07:00  --------------------------------------------------------  IN:    Lactated Ringers Bolus: 3000 mL    Oral Fluid: 1120 mL  Total IN: 4120 mL    OUT:    Ileostomy (mL): 800 mL    Voided (mL): 2750 mL  Total OUT: 3550 mL    Total NET: 570 mL          General: NAD, resting comfortably in bed  C/V: NSR  Pulm: Nonlabored breathing, no respiratory distress  Abd: soft, NT/ND.  Extrem: WWP, no edema, SCDs in place  Drains:  [place]:      LABS:                        10.0   8.85  )-----------( 835      ( 08 May 2023 05:30 )             33.6     05-08    136  |  106  |  14  ----------------------------<  92  4.1   |  22  |  0.82    Ca    9.2      08 May 2023 05:30  Phos  3.4     05-08  Mg     1.9     05-08            RADIOLOGY & ADDITIONAL STUDIES:

## 2023-05-09 NOTE — PROGRESS NOTE ADULT - PROVIDER SPECIALTY LIST ADULT
Intervent Radiology
Surgery
Colorectal Surgery
Colorectal Surgery
Surgery

## 2023-05-09 NOTE — PROGRESS NOTE ADULT - REASON FOR ADMISSION
56 y/o male patient with extensive colonic diverticulitis and rectal bleeding; comes in for colorectal surgery management and admission to surgery [facility] for perioperative care.
58 y/o male patient with extensive colonic diverticulitis and rectal bleeding; comes in for colorectal surgery management and admission to surgery [facility] for perioperative care.
56 y/o male patient with extensive colonic diverticulitis and rectal bleeding; comes in for colorectal surgery management and admission to surgery [facility] for perioperative care.
58 y/o male patient with extensive colonic diverticulitis and rectal bleeding; comes in for colorectal surgery management and admission to surgery [facility] for perioperative care.
s/p colectomy
56 y/o male patient with extensive colonic diverticulitis and rectal bleeding; comes in for colorectal surgery management and admission to surgery [facility] for perioperative care.
56 y/o male patient with extensive colonic diverticulitis and rectal bleeding; comes in for colorectal surgery management and admission to surgery [facility] for perioperative care.
58 y/o male patient with extensive colonic diverticulitis and rectal bleeding; comes in for colorectal surgery management and admission to surgery [facility] for perioperative care.
58 y/o male patient with extensive colonic diverticulitis and rectal bleeding; comes in for colorectal surgery management and admission to surgery [facility] for perioperative care.
56 y/o male patient with extensive colonic diverticulitis and rectal bleeding; comes in for colorectal surgery management and admission to surgery [facility] for perioperative care.

## 2023-05-09 NOTE — PROGRESS NOTE ADULT - ASSESSMENT
57M with PMH of HTN, HLD, JOSE (no CPAP), diverticulitis presents for colon resection now s/p dx lap converted to open total colectomy 4/25.    LFD/IVF @ 40  PO pain meds   SQH/SCDs  OOBA/IS  Home Cardizem   1L LR Bolus to replete diarrheal losses 
57M with PMH of HTN, HLD, JOSE (no CPAP), diverticulitis presents for colon resection now s/p dx lap converted to open total colectomy 4/25.    LRD/IVF  SQH/SCDs  OOBA/IS  Home Cardizem 
57M with PMH of HTN, HLD, JOSE (no CPAP), diverticulitis presents for colon resection now s/p dx lap converted to open total colectomy 4/25. C/b leak and now s/p ex lap w/ creation of DLI 5/2.    NPO/IVF  Pain/nausea control PRN  Zosyn (5/1-)  NGT to [facility]  Mcmanus removed, TOV pending   DAVE x1  HSQ/SCDs  IS/OOBA  Home cardizem
57M with PMH of HTN, HLD, JOSE (no CPAP), diverticulitis presents for colon resection now s/p dx lap converted to open total colectomy 4/25.    CLD/IVF  PCA for pain control  Mcmanus  SQH/SCDs  OOBA/IS  Mcmanus  Home Cardizem 
57M with PMH of HTN, HLD, JOSE (no CPAP), diverticulitis presents for colon resection now s/p dx lap converted to open total colectomy 4/25.    LFD/IVF @ 40  1L bolus q4 for diarrhea  1xdose Lomotil  PO pain meds   SQH/SCDs  OOBA/IS  Home Cardizem   
57M with PMH of HTN, HLD, JOSE (no CPAP), diverticulitis presents for colon resection now s/p dx lap converted to open total colectomy 4/25. C/b leak and now s/p ex lap w/ creation of DLI 5/2.    LRD  Pain/nausea control PRN  Zosyn (5/1-)  DAVE x1  HSQ/SCDs  Lomotil BID  IS/OOBA  Home cardizem  Dispo: PT - no skilled PT needs; home care for ostomy mgmt 
57M with PMH of HTN, HLD, JOSE (no CPAP), diverticulitis presents for colon resection now s/p dx lap converted to open total colectomy 4/25. C/b leak and now s/p ex lap w/ creation of DLI 5/2.    d/c home w/ home care today  LRD  Pain/nausea control PRN  s/p Zosyn (5/1-5/6)  HSQ/SCDs  Lomotil BID  IS/OOBA  Home cardizem  Dispo: HPT w/ rolling walker, stoma nurse
57M with PMH of HTN, HLD, JOSE (no CPAP), diverticulitis presents for colon resection now s/p dx lap converted to open total colectomy 4/25.    CT A/P IV/PO   LFD/IVF @ 40  PO pain meds   SQH/SCDs  OOBA/IS  Home Cardizem   
57M with PMH of HTN, HLD, JOSE (no CPAP), diverticulitis presents for colon resection now s/p dx lap converted to open total colectomy 4/25. C/b leak and now s/p ex lap w/ creation of DLI 5/2.    1LR bolus  LRD/ IVHL  Pain/nausea control PRN  Zosyn (5/1-)  DAVE x1  HSQ/SCDs  Lomotil BID  IS/OOBA  Home cardizem  Dispo: PT - no skilled PT needs; home care for ostomy mgmt 
57M with PMH of HTN, HLD, JOSE (no CPAP), diverticulitis presents for colon resection now s/p dx lap converted to open total colectomy 4/25. C/b leak and now s/p ex lap w/ creation of DLI 5/2.    FLD/IVF  Pain/nausea control PRN  Zosyn (5/1-)  DAVE x1  HSQ/SCDs  IS/OOBA  f/u ostomy RN   Home cardizem  Dispo: PT - no skilled PT needs; home care for ostomy mgmt 
57M with PMH of HTN, HLD, JOSE (no CPAP), diverticulitis presents for colon resection now s/p dx lap converted to open total colectomy 4/25. C/b leak and now s/p ex lap w/ creation of DLI 5/2.    LRD  500cc bolus for output  Pain/nausea control PRN  Zosyn (5/1-)  DAVE x1  HSQ/SCDs  Lomotil BID  IS/OOBA  Home cardizem  Dispo: PT - no skilled PT needs; home care for ostomy mgmt 
57M with PMH of HTN, HLD, JOSE (no CPAP), diverticulitis presents for colon resection now s/p dx lap converted to open total colectomy 4/25. C/b leak and now s/p ex lap w/ creation of DLI 5/2.    d/c home w/ home care  LRD  Pain/nausea control PRN  s/p Zosyn (5/1-5/6)  HSQ/SCDs  Lomotil BID  IS/OOBA  Home cardizem  Dispo: HPT w/ rolling walker, stoma nurse

## 2023-05-11 DIAGNOSIS — K57.90 DIVERTICULOSIS OF INTESTINE, PART UNSPECIFIED, WITHOUT PERFORATION OR ABSCESS WITHOUT BLEEDING: ICD-10-CM

## 2023-05-11 DIAGNOSIS — K57.31 DIVERTICULOSIS OF LARGE INTESTINE WITHOUT PERFORATION OR ABSCESS WITH BLEEDING: ICD-10-CM

## 2023-05-11 DIAGNOSIS — Z90.49 ACQUIRED ABSENCE OF OTHER SPECIFIED PARTS OF DIGESTIVE TRACT: ICD-10-CM

## 2023-05-11 DIAGNOSIS — Z87.442 PERSONAL HISTORY OF URINARY CALCULI: ICD-10-CM

## 2023-05-11 DIAGNOSIS — Z79.899 OTHER LONG TERM (CURRENT) DRUG THERAPY: ICD-10-CM

## 2023-05-11 DIAGNOSIS — K66.0 PERITONEAL ADHESIONS (POSTPROCEDURAL) (POSTINFECTION): ICD-10-CM

## 2023-05-11 DIAGNOSIS — I95.1 ORTHOSTATIC HYPOTENSION: ICD-10-CM

## 2023-05-11 DIAGNOSIS — Z86.010 PERSONAL HISTORY OF COLONIC POLYPS: ICD-10-CM
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
## 2023-05-11 DIAGNOSIS — I10 ESSENTIAL (PRIMARY) HYPERTENSION: ICD-10-CM

## 2023-05-11 DIAGNOSIS — T85.598A OTHER MECHANICAL COMPLICATION OF OTHER GASTROINTESTINAL PROSTHETIC DEVICES, IMPLANTS AND GRAFTS, INITIAL ENCOUNTER: ICD-10-CM

## 2023-05-11 DIAGNOSIS — E78.5 HYPERLIPIDEMIA, UNSPECIFIED: ICD-10-CM
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
## 2023-05-11 DIAGNOSIS — R18.8 OTHER ASCITES: ICD-10-CM

## 2023-05-11 DIAGNOSIS — G47.33 OBSTRUCTIVE SLEEP APNEA (ADULT) (PEDIATRIC): ICD-10-CM

## 2023-05-11 DIAGNOSIS — G47.00 INSOMNIA, UNSPECIFIED: ICD-10-CM

## 2023-05-15 ENCOUNTER — APPOINTMENT (OUTPATIENT)
Dept: COLORECTAL SURGERY | Facility: CLINIC | Age: 58
End: 2023-05-15
Payer: COMMERCIAL

## 2023-05-15 VITALS
HEIGHT: 74 IN | DIASTOLIC BLOOD PRESSURE: 87 MMHG | HEART RATE: 114 BPM | TEMPERATURE: 98.7 F | WEIGHT: 181 LBS | BODY MASS INDEX: 23.23 KG/M2 | SYSTOLIC BLOOD PRESSURE: 115 MMHG

## 2023-05-15 PROCEDURE — 99024 POSTOP FOLLOW-UP VISIT: CPT

## 2023-05-15 NOTE — ASSESSMENT
[FreeTextEntry1] : Recovering well from surgery.\par \par Advised increasing Lomotil to 2 pills 3 times a day.  Increasing hydration.\par \par Follow-up 3 weeks for sigmoidoscopy and planning for ileostomy closure

## 2023-05-15 NOTE — PHYSICAL EXAM
[Abdomen Masses] : No abdominal masses [Abdomen Tenderness] : ~T No ~M abdominal tenderness [No HSM] : no hepatosplenomegaly [JVD] : no jugular venous distention  [Normal Breath Sounds] : Normal breath sounds [Normal Heart Sounds] : normal heart sounds [No Rash or Lesion] : No rash or lesion [Alert] : alert [de-identified] : Abdomen is soft, nontender, nondistended. Incisions are well healed. No hernia or masses\par Ileostomy functional.  Formed effluent.

## 2023-05-15 NOTE — HISTORY OF PRESENT ILLNESS
[FreeTextEntry1] : 56 y/o M with history of pancolonic diverticulosis s/p laparoscopic converted to open total colectomy with ileorectal anastomosis on 4/25/23, c/b anastomotic leak requiring RTOR for exploratory laparotomy with open creation of diverting loop ileostomy 5/2/23. Presents for post operative evaluation \par \par H/o HTN, HLD, sleep apnea, kidney stones\par PSH hernia repair, partial colon resection, RBL x 2 (1990s, and recently w/dr. Deleon)\par \par Surgical Pathology Report - Auth (Verified)\par \par Specimen(s) Submitted\par 1  Terminal Ileum colon anastomosis\par 2  Anastomosis donut\par \par Final Diagnosis\par 1.  Terminal ileum and colon with anastomosis, resection:\par -   Diverticulosis with mild acute inflammation (acute\par diverticulitis)\par \par -   Inflammatory pseudopolyp, 1.7 cm greatest dimension\par -   Terminal ileum with lymphoid hyperplasia\par -   Mild acute appendicitis with transmural inflammation and acute\par serositis\par -   Anastomosis site identified\par -   4 benign lymph nodes\par \par Note: Inflammatory pseudopolyp such as this can be seen in multiple\par conditions.  They can develop in response to diverticulosis (diverticular\par disease associated polyp).  Additionally, inflammatory pseudopolyps can\par develop at/near an anastomosis site.  The polyp is entirely submitted and\par there is no dysplasia or malignancy.\par \par 2.  Anastomosis donut, excision:\par -   Unremarkable colonic and terminal ileal mucosa (anastomosis site)\par Verified by: Prasanth Colby M.D.\par (Electronic Signature)\par Reported on: 05/04/23 11:30 EDT, [facility], [address],\par [place] [id number]\par Phone: [phone]   Fax: [phone]\par _________________________________________________________________\par \par \par Clinical Information\par Diverticulitis\par \par Gross Description\par 1.  Received: Fresh labeled  "terminal ileum, colon anastomosis"\par Integrity:  Intact\par Orientation:  Oriented by anatomic landmarks\par Proximal:  Open ended, 5.5 cm in circumference\par Distal:  Stapled, 5.0 cm in circumference\par Terminal Ileum\par Length:  12.5 cm\par Circumference:  4.0 cm\par Wall Thickness:  0.2-0.4 cm, line\par Mesenteric Width:  6.0 cm\par No definitive anastomotic staple line is seen. There is a possible area\par of anastomosis: 3.0 x 2.0 cm area of possible small bowel adjacent to\par ascending colon\par Colon\par Length:  120 cm\par Cecum Circumference:  5.5 cm\par Colonic Circumference:   3.0-13.0 cm\par Mesenteric Width:  16.0 cm\par Wall Thickness:  0.1-0.4 cm\par Pedicle:  Not present\par Appendix: Size:  7.0 cm in length, 0.7 cm in diameter\par Appendix Appearance:   Unremarkable\par polyp\par Area of Interest -\par Size:  1.7 x 1.5 x 1.0 cm, focally red grey-pink\par Location:  Ascending colon\par Cut Surface:  White and firm\par Extension:  Into but not through wall\par Distance to Proximal Margin:   15.0 cm\par Distance to Distal Margin:   115 cm\par Serosa:  Pink-tan and smooth with attached adipose tissue\par Mucosa:  Tan-pink with thickened folds and diverticula\par Diverticula:  Numerous\par Perforation:  Not present\par Abscess:  Not present\par Lumen:  No abnormality\par Attached Fat:  Unremarkable\par Possible Lymph Node(s):   0.1 cm, 0.3 cm\par Additional Findings:   Also received are two portions of aguilera-yellow\par finely lobulated soft omental tissue measuring 27.0 x 15.0 x 2.0 cm and\par 16.5 x 6.0 x 2.5 cm\par Submitted:  Representative sections in 18 cassettes:\par 1A: Proximal margin\par 1B: Distal margin\par 1C: Appendix\par 1D: Unremarkable ileum\par 1E-1G: Polyp\par 1H-1L: Colon, diverticula\par 1M-1N: Possible anastomosis (one section, divided, conjoining\par sides=orange)\par 1O: Unremarkable\par 1P: Omentum, larger portion\par 1Q: Omentum, smaller portion\par 1R: Lymph nodes\par \par 2.  Received: In formalin labeled  "donuts anastomosis"\par Size:  Two, 2.0 x 1.7 x 0.7 cm and 2.5 x 2.0 x 0.5 cm\par Description:  Pink-tan, congested, annular\par Cut Surface:  Tan, soft with numerous staples and sutures\par Anvil:  4.7 x 0.5 cm with a 2.7 cm disc\par Submitted:  Representative sections in one cassette: 2A\par Trista Daniel 05/02/2023 12:05 PM\par \par Patient reports slow improvement in his energy and activity.  Good appetite.  Drinking plenty of fluids.  Ileostomy output intermittently formed/pastelike\par \par No abdominal pain.\par  \par

## 2023-05-17 RX ORDER — OXYCODONE 5 MG/1
5 TABLET ORAL
Qty: 20 | Refills: 0 | Status: ACTIVE | COMMUNITY
Start: 2023-05-17 | End: 1900-01-01

## 2023-05-17 RX ORDER — DIPHENOXYLATE HYDROCHLORIDE AND ATROPINE SULFATE 2.5; .025 MG/1; MG/1
2.5-0.025 TABLET ORAL 3 TIMES DAILY
Qty: 120 | Refills: 2 | Status: ACTIVE | COMMUNITY
Start: 2023-05-17 | End: 1900-01-01

## 2023-05-18 RX ORDER — DIPHENOXYLATE HYDROCHLORIDE AND ATROPINE SULFATE 2.5; .025 MG/1; MG/1
2.5-0.025 TABLET ORAL 4 TIMES DAILY
Qty: 300 | Refills: 3 | Status: ACTIVE | COMMUNITY
Start: 2023-05-18 | End: 1900-01-01

## 2023-06-05 ENCOUNTER — APPOINTMENT (OUTPATIENT)
Dept: COLORECTAL SURGERY | Facility: CLINIC | Age: 58
End: 2023-06-05
Payer: COMMERCIAL

## 2023-06-05 VITALS
HEIGHT: 74 IN | WEIGHT: 172 LBS | HEART RATE: 101 BPM | BODY MASS INDEX: 22.07 KG/M2 | DIASTOLIC BLOOD PRESSURE: 83 MMHG | TEMPERATURE: 98.3 F | SYSTOLIC BLOOD PRESSURE: 113 MMHG

## 2023-06-05 DIAGNOSIS — K57.10 DIVERTICULOSIS OF SMALL INTESTINE W/OUT PERFORATION OR ABSCESS W/OUT BLEEDING: ICD-10-CM

## 2023-06-05 PROCEDURE — 99024 POSTOP FOLLOW-UP VISIT: CPT

## 2023-06-05 NOTE — ASSESSMENT
[FreeTextEntry1] : Superficial wound sinuses.  Local wound care\par \par I stressed the patient the need for local wound care including wet-to-dry dressing changes daily.\par \par In addition I have counseled the patient extensively about continuing with antidiarrheal medications but increasing his oral hydration intake.  He is not drinking adequately.  I have discussed with him that if he continues to not take in enough p.o. water intake he would be at risk for dehydration and secondary complications including kidney injury, fall and others.

## 2023-06-05 NOTE — PHYSICAL EXAM
[Abdomen Masses] : No abdominal masses [Abdomen Tenderness] : ~T No ~M abdominal tenderness [No HSM] : no hepatosplenomegaly [JVD] : no jugular venous distention  [Normal Breath Sounds] : Normal breath sounds [Normal Heart Sounds] : normal heart sounds [No Rash or Lesion] : No rash or lesion [Alert] : alert [de-identified] : Abdomen is soft, nontender, nondistended.  Ileostomy functional.  Formed effluent.  Multiple inferior incision sinuses.  Area prepped and draped.  Local given.  Wound open superficial sinuses connected and evacuated.  Serous drainage.  Wet-to-dry dressing placed.

## 2023-06-05 NOTE — HISTORY OF PRESENT ILLNESS
[FreeTextEntry1] : 56 yo M with history of pancolonic diverticulosis s/p laparoscopic converted to open total colectomy with ileorectal anastomosis on 4/25/23, c/b anastomotic leak requiring RTOR for exploratory laparotomy with open creation of diverting loop ileostomy 5/2/23.\par \par Last seen 5/15/23 for post op follow up. Exam noted soft, nontender abdomen, incisions well healed.\par Pt recommended to increase Lomotil to 2 pills three times daily.\par \par Recommend f/u in 3 weeks for sigmoidoscopy and ileostomy closure planning\par \par Pt has been taking Lomotil 2 tabs four times daily, he reports it makes him drowsy.\par \par This last Friday, pt took a shower and noticed a bump to lower half of midline incision which opened up and pt noticed large quantity of white discharge all over bath robe, down his leg and in a puddle on the ground. He called on call service and was advised to continue hot showers. On Saturday, VNS inspected wound, per pt concerned about at abscess above ostomy site. Pt reports continue white discharge and he applies gauze. \par Today he didn't have ostomy output from 730 AM to 130 PM, now productive. Yesterday had 1000 cc output and he continues 4 tabs Lomotil daily. Typically has output within 30-45 min of eating\par Denies fever or chills

## 2023-06-19 ENCOUNTER — APPOINTMENT (OUTPATIENT)
Dept: COLORECTAL SURGERY | Facility: CLINIC | Age: 58
End: 2023-06-19
Payer: COMMERCIAL

## 2023-06-19 VITALS
BODY MASS INDEX: 21.82 KG/M2 | SYSTOLIC BLOOD PRESSURE: 110 MMHG | DIASTOLIC BLOOD PRESSURE: 80 MMHG | HEART RATE: 95 BPM | HEIGHT: 74 IN | WEIGHT: 170 LBS | TEMPERATURE: 98.2 F

## 2023-06-19 DIAGNOSIS — Z93.2 ILEOSTOMY STATUS: ICD-10-CM

## 2023-06-19 PROCEDURE — 45330 DIAGNOSTIC SIGMOIDOSCOPY: CPT | Mod: 58

## 2023-06-19 PROCEDURE — 99024 POSTOP FOLLOW-UP VISIT: CPT

## 2023-06-19 NOTE — PHYSICAL EXAM
[Abdomen Masses] : No abdominal masses [Abdomen Tenderness] : ~T No ~M abdominal tenderness [No HSM] : no hepatosplenomegaly [Normal] : was normal [None] : there was no rectal mass  [JVD] : no jugular venous distention  [Normal Breath Sounds] : Normal breath sounds [Normal Heart Sounds] : normal heart sounds [No Rash or Lesion] : No rash or lesion [Alert] : alert [Calm] : calm [de-identified] : Abdomen is soft, nontender, nondistended.  Ileostomy functional.  Formed effluent.   [FreeTextEntry1] : The risks , benefits and alternatives of the procedure were reviewed with the patient. The patient consents to the planned procedure.\par \par The flexible sigmoidoscope was passed through the anus into the rectum. The scope was passed to  25   cm from the anal verge.\par \par The findings revealed:\par \par Well-formed anastomosis at 15 cm.  Scope advanced through the anastomosis into the ileum without difficulty.

## 2023-06-19 NOTE — ASSESSMENT
[FreeTextEntry1] : I had extensive discussion with the patient (45 minutes) regarding the diagnosis and treatment options. I recommended that he consider proceeding with a closure of ileostomy- small bowel resection..\par \par The associated risks, benefits, alternatives of the procedure have been outlined discussed and reviewed with the patient'. These risks including but not limited to bleeding, infection, anastomotic leak, need for secondary surgery, need for ileostomy or colostomy creation, change in bowel habits, as well as the risk of heart and lung complications infection and death were detailed. The patient understands these risks and consents the planned procedure. Appropriate  literature regarding surgery and post operative treatment/complications and enhanced recovery pathway has been detailed and reviewed. Consent was obtained. All questions were answered.\par \par \par I reviewed with the patient that he will have increased to frequency.  The need for long-term use of antidiarrheal agents was outlined.

## 2023-06-19 NOTE — HISTORY OF PRESENT ILLNESS
[FreeTextEntry1] : 56 y/o M with history of pancolonic diverticulosis, s/p diverticulosis s/p laparoscopic converted to open total colectomy with ileorectal anastomosis on 4/25/23, c/b anastomotic leak requiring RTOR for exploratory laparotomy with open creation of diverting loop ileostomy 5/2/23.\par \par Seen 5/15/23 for post op follow up. Exam noted soft, nontender abdomen, incisions well healed.\par Pt recommended to increase Lomotil to 2 pills three times daily.\par \par Recommend f/u in 3 weeks for sigmoidoscopy and ileostomy closure planning\par \par Most recently seen in follow up 6/5/23, Patient reports (6/2) he  took a shower and noticed a bump to lower half of midline incision which opened up and pt noticed large quantity of white discharge all over bath robe, down his leg and in a puddle on the ground. He called on call service and was advised to continue hot showers. On Saturday, VNS inspected wound, per pt concerned about at abscess above ostomy site. Pt reports continue white discharge and he applies gauze.  Today he didn't have ostomy output from 730 AM to 130 PM, now productive. Yesterday had 1000 cc output and he continues 4 tabs Lomotil daily. Typically has output within 30-45 min of eating. \par \par On exam, no abdominal masses, no abdominal tenderness. Abdomen is soft, non distended. Ileostomy functional. Formed effluent, multiple inferior incision sinuses. Area prepped and draped, local anesthetic give, wound open superficial sinuses connected and evacuated. Serous drainage. Wet- dry dressing applied. Reinforced the importance of proper hydration, if he continues not to take enough PO water, he poses high risk for dehydration, and secondary complications. \par \par Ostomy output around 900cc per day, currently taking Lomotil 2 tabs at 8 AM, 1 tab Lomotil 1 PM, 1 tab Imodium at 6 PM and 2 tabs Lomotil at 11 PM. Reports Lomotil makes him exhausted and feels "like a zombie."  Output varies from watery to thicker depending on food/fluids. Appetite slowly improving and has been eating slightly more including starches, proteins, some fruits. Drinks diluted Gatorade or cranberry juice, some water.\par

## 2023-07-05 VITALS
HEART RATE: 90 BPM | DIASTOLIC BLOOD PRESSURE: 81 MMHG | OXYGEN SATURATION: 99 % | HEIGHT: 75 IN | WEIGHT: 161.6 LBS | RESPIRATION RATE: 17 BRPM | TEMPERATURE: 97 F | SYSTOLIC BLOOD PRESSURE: 117 MMHG

## 2023-07-05 NOTE — DISCHARGE NOTE NURSING/CASE MANAGEMENT/SOCIAL WORK - NSSCCONTNUM_GEN_ALL_CORE
[FreeTextEntry1] : 2/15/23\par Plan - skin sub. applied/veil over, change above veil, xtrasorb/dynaflex\par discussed doing repeat vascular testing, last ultrasound 2 yrs ago, maybe changes in leg that is contributing to this wound not closing\par follow up 2 weeks\par nurse orders given, nurse to send in picture after 1 week to see if product still over wound bed\par \par 7/5/23\par Plan:\par LLE Apply Ioplex/Xtrasorb/Multilayer compression dressings\par RLE Today applied multilayer compression dressing to reduce swelling. Nurse will put on compression garment on next visit.\par Change dressing  3 times per week.\par Leg elevation as tolerated\par Encouraged ambulation or exercise.\par Optimization of nutrition.\par Offloading to the wound site.\par Home care orders in place\par Follow up appointment scheduled for 2-3 weeks\par \par  [phone]

## 2023-07-05 NOTE — PATIENT PROFILE ADULT - HOW PATIENT ADDRESSED, PROFILE
Case Management Discharge Planning    Admission Date: 10/23/2022  GMLOS: 2.2  ALOS: 1    6-Clicks ADL Score: 24  6-Clicks Mobility Score: 21      Anticipated Discharge Dispo: Discharge Disposition: Discharged to home/self care (01)    DME Needed: No    Action(s) Taken: RNCM attended IDT rounds and reviewed chart. No CM needs noted at this time.    Escalations Completed: None    Medically Clear: No    Next Steps:  f/u with medical team to discuss DC needs and barriers    Barriers to Discharge: Medical clearance           Nick

## 2023-07-06 ENCOUNTER — APPOINTMENT (OUTPATIENT)
Dept: COLORECTAL SURGERY | Facility: HOSPITAL | Age: 58
End: 2023-07-06

## 2023-07-17 ENCOUNTER — TRANSCRIPTION ENCOUNTER (OUTPATIENT)
Age: 58
End: 2023-07-17

## 2023-07-17 RX ORDER — DILTIAZEM HCL 120 MG
1 CAPSULE, EXT RELEASE 24 HR ORAL
Refills: 0 | DISCHARGE

## 2023-07-17 RX ORDER — OMEPRAZOLE 10 MG/1
1 CAPSULE, DELAYED RELEASE ORAL
Refills: 0 | DISCHARGE

## 2023-07-17 RX ORDER — TRIAMTERENE/HYDROCHLOROTHIAZID 75 MG-50MG
1 TABLET ORAL
Refills: 0 | DISCHARGE

## 2023-07-17 RX ORDER — FENOFIBRATE,MICRONIZED 130 MG
1 CAPSULE ORAL
Refills: 0 | DISCHARGE

## 2023-07-18 ENCOUNTER — APPOINTMENT (OUTPATIENT)
Dept: COLORECTAL SURGERY | Facility: HOSPITAL | Age: 58
End: 2023-07-18

## 2023-07-18 ENCOUNTER — INPATIENT (INPATIENT)
Facility: HOSPITAL | Age: 58
LOS: 6 days | Discharge: HOME HEALTH | DRG: 329 | End: 2023-07-25
Attending: SURGERY | Admitting: SURGERY
Payer: COMMERCIAL

## 2023-07-18 ENCOUNTER — RESULT REVIEW (OUTPATIENT)
Age: 58
End: 2023-07-18

## 2023-07-18 DIAGNOSIS — Z98.890 OTHER SPECIFIED POSTPROCEDURAL STATES: Chronic | ICD-10-CM

## 2023-07-18 DIAGNOSIS — Z90.49 ACQUIRED ABSENCE OF OTHER SPECIFIED PARTS OF DIGESTIVE TRACT: Chronic | ICD-10-CM

## 2023-07-18 DIAGNOSIS — Z93.3 COLOSTOMY STATUS: Chronic | ICD-10-CM

## 2023-07-18 PROCEDURE — 44625 REPAIR BOWEL OPENING: CPT | Mod: GC,58

## 2023-07-18 PROCEDURE — 88304 TISSUE EXAM BY PATHOLOGIST: CPT | Mod: 26

## 2023-07-18 DEVICE — STAPLER COVIDIEN TRI-STAPLE 60MM PURPLE RELOAD: Type: IMPLANTABLE DEVICE | Status: FUNCTIONAL

## 2023-07-18 RX ORDER — KETOROLAC TROMETHAMINE 30 MG/ML
15 SYRINGE (ML) INJECTION EVERY 6 HOURS
Refills: 0 | Status: DISCONTINUED | OUTPATIENT
Start: 2023-07-18 | End: 2023-07-19

## 2023-07-18 RX ORDER — OXYCODONE HYDROCHLORIDE 5 MG/1
5 TABLET ORAL EVERY 4 HOURS
Refills: 0 | Status: DISCONTINUED | OUTPATIENT
Start: 2023-07-18 | End: 2023-07-25

## 2023-07-18 RX ORDER — HYDROMORPHONE HYDROCHLORIDE 2 MG/ML
0.5 INJECTION INTRAMUSCULAR; INTRAVENOUS; SUBCUTANEOUS ONCE
Refills: 0 | Status: DISCONTINUED | OUTPATIENT
Start: 2023-07-18 | End: 2023-07-18

## 2023-07-18 RX ORDER — HEPARIN SODIUM 5000 [USP'U]/ML
5000 INJECTION INTRAVENOUS; SUBCUTANEOUS ONCE
Refills: 0 | Status: DISCONTINUED | OUTPATIENT
Start: 2023-07-18 | End: 2023-07-18

## 2023-07-18 RX ORDER — ACETAMINOPHEN 500 MG
1000 TABLET ORAL ONCE
Refills: 0 | Status: DISCONTINUED | OUTPATIENT
Start: 2023-07-18 | End: 2023-07-19

## 2023-07-18 RX ORDER — ACETAMINOPHEN 500 MG
1000 TABLET ORAL EVERY 6 HOURS
Refills: 0 | Status: COMPLETED | OUTPATIENT
Start: 2023-07-18 | End: 2023-07-19

## 2023-07-18 RX ORDER — OXYCODONE HYDROCHLORIDE 5 MG/1
2.5 TABLET ORAL EVERY 4 HOURS
Refills: 0 | Status: DISCONTINUED | OUTPATIENT
Start: 2023-07-18 | End: 2023-07-25

## 2023-07-18 RX ORDER — SODIUM CHLORIDE 9 MG/ML
1000 INJECTION, SOLUTION INTRAVENOUS
Refills: 0 | Status: DISCONTINUED | OUTPATIENT
Start: 2023-07-18 | End: 2023-07-19

## 2023-07-18 RX ORDER — HEPARIN SODIUM 5000 [USP'U]/ML
5000 INJECTION INTRAVENOUS; SUBCUTANEOUS EVERY 8 HOURS
Refills: 0 | Status: DISCONTINUED | OUTPATIENT
Start: 2023-07-18 | End: 2023-07-25

## 2023-07-18 RX ADMIN — Medication 1000 MILLIGRAM(S): at 19:46

## 2023-07-18 RX ADMIN — Medication 400 MILLIGRAM(S): at 19:31

## 2023-07-18 RX ADMIN — SODIUM CHLORIDE 40 MILLILITER(S): 9 INJECTION, SOLUTION INTRAVENOUS at 19:33

## 2023-07-18 RX ADMIN — HYDROMORPHONE HYDROCHLORIDE 0.5 MILLIGRAM(S): 2 INJECTION INTRAMUSCULAR; INTRAVENOUS; SUBCUTANEOUS at 20:28

## 2023-07-18 RX ADMIN — Medication 15 MILLIGRAM(S): at 23:31

## 2023-07-18 RX ADMIN — HYDROMORPHONE HYDROCHLORIDE 0.5 MILLIGRAM(S): 2 INJECTION INTRAMUSCULAR; INTRAVENOUS; SUBCUTANEOUS at 20:45

## 2023-07-18 RX ADMIN — HEPARIN SODIUM 5000 UNIT(S): 5000 INJECTION INTRAVENOUS; SUBCUTANEOUS at 23:19

## 2023-07-18 NOTE — PHYSICAL THERAPY INITIAL EVALUATION ADULT - DID THE PATIENT HAVE SURGERY?
Valve is a bit better and I know symptoms do not seem to be too bad , but you are young and valve is quite significant. My personal feeling would be to allow the symptoms to worsen and/or echo to worsen before another procedure , but let's see the [facility] to just get an opinion.I believe the valve is too calcified to do a balloon so likely a valve replacement would be needed  if we intervene.    Please contact me if you have any additional concerns.    Sincerely,    Eugene Cordero Total abdominal colectomy with ileoproctostomy ;  Proctoscopy/yes

## 2023-07-18 NOTE — PROGRESS NOTE ADULT - SUBJECTIVE AND OBJECTIVE BOX
Procedure: Ileostomy reversal  Surgeon: Dr. Villegas    S: Pt seen and examined bedside. He has no acute complaints. He reports some soreness of his abdomen, but states that pain is well controlled with medication. He is tolerating the clear liquid diet. -BF. Denies CP, SOB, VELASCO, calf tenderness or edema, nausea, emesis, or fevers.    O:  T(C): --  T(F): --  HR: --  BP: --  RR: --  SpO2: --  Wt(kg): --      Gen: NAD, resting comfortably in bed  C/V: NSR  Pulm: Nonlabored breathing, no respiratory distress  Abd: Soft, NTND Incision: c/d/i  : Mcmanus draining slightly concentrated yellow urine to gravity  Extrem: WWP, no calf edema or tenderness, SCDs in place      A/P: 57 year old male with PMHx of extensive colonic diverticulitis, HTN, HLD, JOSE and nephrolithiasis PSHx hernia repair, total abdominal colectomy with ileoproctostomy (Dr. Villegas 04/25/23) c/b small leak RTOR for open creation of DLI (5/2/23). Now s/p ileostomy reversal (7/18).    CLD/IVF  Pain/nausea control PRN  Mcmanus  Dressing change on POD2  HSQ/SCDs  IS/OOB

## 2023-07-18 NOTE — H&P ADULT - HISTORY OF PRESENT ILLNESS
57 year old male with PMHx of extensive colonic diverticulitis and rectal bleeding, HTN, HLD, JOSE and nephrolithiasis. PSHx hernia repair, total abdominal colectomy with ileoproctostomy (Dr. Villegas 04/25/23) c/b small leak RTOR for open creation of DLI (5/2/23). Now presents for ileostomy reversal (7/18).

## 2023-07-18 NOTE — H&P ADULT - ASSESSMENT
57 year old male with PMHx of extensive colonic diverticulitis and rectal bleeding, HTN, HLD, JOSE and nephrolithiasis. PSHx hernia repair, total abdominal colectomy with ileoproctostomy (Dr. Villegas 04/25/23) c/b small leak RTOR for open creation of DLI (5/2/23). Now presents for ileostomy reversal (7/18).    Plan:  OR for ileostomy reversal 7/18

## 2023-07-18 NOTE — H&P ADULT - NSHPPHYSICALEXAM_GEN_ALL_CORE
T(C): 36.4 (07-18-23 @ 23:06), Max: 36.4 (07-18-23 @ 23:06)  HR: 66 (07-18-23 @ 23:06) (56 - 90)  BP: 144/84 (07-18-23 @ 23:06) (105/77 - 152/67)  RR: 17 (07-18-23 @ 23:06) (13 - 29)  SpO2: 98% (07-18-23 @ 23:06) (98% - 100%)    CONSTITUTIONAL: Well groomed, no apparent distress  EYES: PERRLA and symmetric, EOMI, No conjunctival or scleral injection, non-icteric  ENMT: Oral mucosa with moist membranes. Normal dentition; no pharyngeal injection or exudates  RESP: No respiratory distress, no use of accessory muscles; CTA b/l, no WRR  CV: RRR, +S1S2, no MRG; no JVD; no peripheral edema  GI: Soft, NT, ND, no rebound, no guarding. Stoma pink and patent. Ostomy with gas and stool.  MSK: Normal gait; No digital clubbing or cyanosis; examination of the (head/neck/spine/ribs/pelvis, RUE, LUE, RLE, LLE) without misalignment,   SKIN: No rashes or ulcers noted; no subcutaneous nodules or induration palpable  NEURO: CN II-XII intact; normal reflexes in upper and lower extremities, sensation intact in upper and lower extremities b/l to light touch   PSYCH: Appropriate insight/judgment; A+O x 3, mood and affect appropriate, recent/remote memory intact

## 2023-07-18 NOTE — PRE-ANESTHESIA EVALUATION ADULT - HEART RATE (BEATS/MIN)
September 12, 2022      To Whom It May Concern:      Kit Goncalves was seen in our Emergency Department today, 09/12/22.  I expect his condition to improve over the next 7 days.  He may return to work when improved.    Sincerely,        Jim Chavarria MD         88

## 2023-07-18 NOTE — BRIEF OPERATIVE NOTE - OPERATION/FINDINGS
Reversal of loop ileostomy with side to side functional end to end anastomosis in Justo fashion. Fascia closed with interrupted 0 PDS in figure of eight fashion. 4-0 monocryl pursestring to tighten aperture. Betadine soaked guaze left.

## 2023-07-19 ENCOUNTER — TRANSCRIPTION ENCOUNTER (OUTPATIENT)
Age: 58
End: 2023-07-19

## 2023-07-19 LAB
ANION GAP SERPL CALC-SCNC: 8 MMOL/L — SIGNIFICANT CHANGE UP (ref 5–17)
BUN SERPL-MCNC: 22 MG/DL — SIGNIFICANT CHANGE UP (ref 7–23)
CALCIUM SERPL-MCNC: 8.9 MG/DL — SIGNIFICANT CHANGE UP (ref 8.4–10.5)
CHLORIDE SERPL-SCNC: 106 MMOL/L — SIGNIFICANT CHANGE UP (ref 96–108)
CO2 SERPL-SCNC: 22 MMOL/L — SIGNIFICANT CHANGE UP (ref 22–31)
CREAT SERPL-MCNC: 0.99 MG/DL — SIGNIFICANT CHANGE UP (ref 0.5–1.3)
EGFR: 88 ML/MIN/1.73M2 — SIGNIFICANT CHANGE UP
GLUCOSE SERPL-MCNC: 108 MG/DL — HIGH (ref 70–99)
HCT VFR BLD CALC: 31 % — LOW (ref 39–50)
HGB BLD-MCNC: 9.6 G/DL — LOW (ref 13–17)
MAGNESIUM SERPL-MCNC: 1.8 MG/DL — SIGNIFICANT CHANGE UP (ref 1.6–2.6)
MCHC RBC-ENTMCNC: 22.6 PG — LOW (ref 27–34)
MCHC RBC-ENTMCNC: 31 GM/DL — LOW (ref 32–36)
MCV RBC AUTO: 73.1 FL — LOW (ref 80–100)
NRBC # BLD: 0 /100 WBCS — SIGNIFICANT CHANGE UP (ref 0–0)
PHOSPHATE SERPL-MCNC: 4.1 MG/DL — SIGNIFICANT CHANGE UP (ref 2.5–4.5)
PLATELET # BLD AUTO: 415 K/UL — HIGH (ref 150–400)
POTASSIUM SERPL-MCNC: 3.6 MMOL/L — SIGNIFICANT CHANGE UP (ref 3.5–5.3)
POTASSIUM SERPL-SCNC: 3.6 MMOL/L — SIGNIFICANT CHANGE UP (ref 3.5–5.3)
RBC # BLD: 4.24 M/UL — SIGNIFICANT CHANGE UP (ref 4.2–5.8)
RBC # FLD: 24.7 % — HIGH (ref 10.3–14.5)
SODIUM SERPL-SCNC: 136 MMOL/L — SIGNIFICANT CHANGE UP (ref 135–145)
WBC # BLD: 7.07 K/UL — SIGNIFICANT CHANGE UP (ref 3.8–10.5)
WBC # FLD AUTO: 7.07 K/UL — SIGNIFICANT CHANGE UP (ref 3.8–10.5)

## 2023-07-19 RX ORDER — SODIUM CHLORIDE 9 MG/ML
1000 INJECTION, SOLUTION INTRAVENOUS
Refills: 0 | Status: DISCONTINUED | OUTPATIENT
Start: 2023-07-19 | End: 2023-07-20

## 2023-07-19 RX ORDER — MAGNESIUM SULFATE 500 MG/ML
1 VIAL (ML) INJECTION ONCE
Refills: 0 | Status: COMPLETED | OUTPATIENT
Start: 2023-07-19 | End: 2023-07-19

## 2023-07-19 RX ORDER — POTASSIUM CHLORIDE 20 MEQ
40 PACKET (EA) ORAL ONCE
Refills: 0 | Status: COMPLETED | OUTPATIENT
Start: 2023-07-19 | End: 2023-07-19

## 2023-07-19 RX ORDER — ACETAMINOPHEN 500 MG
650 TABLET ORAL EVERY 6 HOURS
Refills: 0 | Status: DISCONTINUED | OUTPATIENT
Start: 2023-07-19 | End: 2023-07-20

## 2023-07-19 RX ORDER — FENOFIBRATE,MICRONIZED 130 MG
145 CAPSULE ORAL DAILY
Refills: 0 | Status: DISCONTINUED | OUTPATIENT
Start: 2023-07-19 | End: 2023-07-25

## 2023-07-19 RX ADMIN — HEPARIN SODIUM 5000 UNIT(S): 5000 INJECTION INTRAVENOUS; SUBCUTANEOUS at 13:23

## 2023-07-19 RX ADMIN — Medication 400 MILLIGRAM(S): at 05:53

## 2023-07-19 RX ADMIN — Medication 15 MILLIGRAM(S): at 12:11

## 2023-07-19 RX ADMIN — OXYCODONE HYDROCHLORIDE 5 MILLIGRAM(S): 5 TABLET ORAL at 02:19

## 2023-07-19 RX ADMIN — Medication 650 MILLIGRAM(S): at 21:45

## 2023-07-19 RX ADMIN — HEPARIN SODIUM 5000 UNIT(S): 5000 INJECTION INTRAVENOUS; SUBCUTANEOUS at 05:42

## 2023-07-19 RX ADMIN — Medication 15 MILLIGRAM(S): at 18:35

## 2023-07-19 RX ADMIN — Medication 400 MILLIGRAM(S): at 13:23

## 2023-07-19 RX ADMIN — SODIUM CHLORIDE 40 MILLILITER(S): 9 INJECTION, SOLUTION INTRAVENOUS at 07:33

## 2023-07-19 RX ADMIN — HEPARIN SODIUM 5000 UNIT(S): 5000 INJECTION INTRAVENOUS; SUBCUTANEOUS at 21:45

## 2023-07-19 RX ADMIN — OXYCODONE HYDROCHLORIDE 5 MILLIGRAM(S): 5 TABLET ORAL at 03:19

## 2023-07-19 RX ADMIN — Medication 40 MILLIEQUIVALENT(S): at 08:29

## 2023-07-19 RX ADMIN — Medication 15 MILLIGRAM(S): at 05:42

## 2023-07-19 RX ADMIN — Medication 145 MILLIGRAM(S): at 12:10

## 2023-07-19 RX ADMIN — Medication 100 GRAM(S): at 08:29

## 2023-07-19 NOTE — PHYSICAL THERAPY INITIAL EVALUATION ADULT - GAIT DEVIATIONS NOTED, PT EVAL
fairly steady gait, no LOB/knee buckling noted; increased time required with turning; good negotiation through hallway obstacles without gait disturbances noted/decreased nicole/decreased step length

## 2023-07-19 NOTE — DISCHARGE NOTE PROVIDER - NSDCFUADDINST_GEN_ALL_CORE_FT
WOUND CARE;  Please place wet to dry dressing onto the wound. A wet 4x4 guaze with a syringe or normal saline is placed into the wound with a sterile q-tip. A dry 4x4 guaze is placed overtop the wound and secured with paper tape. This is exchanged once a day.    General Discharge Instructions:  Please resume all regular home medications unless specifically advised not to take a particular medication. Also, please take any new medications as prescribed.  Please get plenty of rest, continue to ambulate several times per day, and drink adequate amounts of fluids. Avoid lifting weights greater than 5-10 lbs until you follow-up with your surgeon, who will instruct you further regarding activity restrictions.  Avoid driving or operating heavy machinery while taking pain medications.  YOU WERE PRESCRIBED _____. Please take colace when you are taking this medication to prevent constipation.   ************************************************************************************  Please follow-up with your surgeon and Primary Care Provider (PCP) as advised.  Incision Care:  *Please call your doctor or nurse practitioner if you have increased pain, swelling, redness, or drainage from the incision site.  *Avoid swimming and baths until your follow-up appointment.  *You may shower, and wash surgical incisions with a mild soap and warm water. Gently pat the area dry.    Warning Signs:  Please call your doctor if you experience the following:  *You experience new chest pain, pressure, squeezing or tightness.  *New or worsening cough, shortness of breath, or wheeze.  *If you are vomiting and cannot keep down fluids or your medications.  *You are getting dehydrated due to continued vomiting, diarrhea, or other reasons. Signs of dehydration include dry mouth, rapid heartbeat, or feeling dizzy or faint when standing.  *You see blood or dark/black material when you vomit or have a bowel movement.  *Your pain is not improving within 8-12 hours or is not gone within 24 hours. Call or return immediately if your pain is getting worse, changes location, or moves to your chest or back.  *You have shaking chills, or fever greater than 101.5 degrees Fahrenheit or 38 degrees Celsius.  *Any change in your symptoms, or any new symptoms that concern you.    Please continue a LOW-FIBER DIET. Listed below are some foods you may eat and those you should avoid.   --Allowed foods:  White bread without nuts and seeds  White rice, plain white pasta, and crackers  Refined hot cereals, such as Cream of Wheat, or cold cereals with less than 1 gram of fiber per serving  Pancakes or waffles made from white refined flour  Most canned or well-cooked vegetables and fruits without skins or seeds  Fruit and vegetable juice with little or no pulp, fruit-flavored drinks, and flavored casillas  Tender meat, poultry, fish, eggs and tofu  Milk and foods made from milk — such as yogurt, pudding, ice cream, cheeses and sour cream — if tolerated  Butter, margarine, oils and salad dressings without seeds  --Foods to avoid:  Whole-wheat or whole-grain breads, cereals and pasta  Brown or wild rice and other whole grains, such as oats, kasha, barley and quinoa  Dried fruits and prune juice  Raw fruit, including those with seeds, skin or membranes, such as berries  Raw or undercooked vegetables, including corn  Dried beans, peas and lentils  Seeds and nuts and foods containing them, including peanut butter and other nut butters  Coconut  Popcorn     WOUND CARE;  Please place a dry dressing onto the wound. A dry 4x4 guaze  is placed into the wound with a sterile q-tip. A dry 4x4 guaze is placed overtop the wound and secured with paper tape. This is exchanged once a day.    General Discharge Instructions:  Please resume all regular home medications unless specifically advised not to take a particular medication. Also, please take any new medications as prescribed.  Please get plenty of rest, continue to ambulate several times per day, and drink adequate amounts of fluids. Avoid lifting weights greater than 5-10 lbs until you follow-up with your surgeon, who will instruct you further regarding activity restrictions.  Avoid driving or operating heavy machinery while taking pain medications.  YOU WERE PRESCRIBED oxycodone. Please take colace when you are taking this medication to prevent constipation.   ************************************************************************************  Please follow-up with your surgeon and Primary Care Provider (PCP) as advised.  Incision Care:  *Please call your doctor or nurse practitioner if you have increased pain, swelling, redness, or drainage from the incision site.  *Avoid swimming and baths until your follow-up appointment.  *You may shower, and wash surgical incisions with a mild soap and warm water. Gently pat the area dry.    Warning Signs:  Please call your doctor if you experience the following:  *You experience new chest pain, pressure, squeezing or tightness.  *New or worsening cough, shortness of breath, or wheeze.  *If you are vomiting and cannot keep down fluids or your medications.  *You are getting dehydrated due to continued vomiting, diarrhea, or other reasons. Signs of dehydration include dry mouth, rapid heartbeat, or feeling dizzy or faint when standing.  *You see blood or dark/black material when you vomit or have a bowel movement.  *Your pain is not improving within 8-12 hours or is not gone within 24 hours. Call or return immediately if your pain is getting worse, changes location, or moves to your chest or back.  *You have shaking chills, or fever greater than 101.5 degrees Fahrenheit or 38 degrees Celsius.  *Any change in your symptoms, or any new symptoms that concern you.    Please continue a LOW-FIBER DIET. Listed below are some foods you may eat and those you should avoid.   --Allowed foods:  White bread without nuts and seeds  White rice, plain white pasta, and crackers  Refined hot cereals, such as Cream of Wheat, or cold cereals with less than 1 gram of fiber per serving  Pancakes or waffles made from white refined flour  Most canned or well-cooked vegetables and fruits without skins or seeds  Fruit and vegetable juice with little or no pulp, fruit-flavored drinks, and flavored casillas  Tender meat, poultry, fish, eggs and tofu  Milk and foods made from milk — such as yogurt, pudding, ice cream, cheeses and sour cream — if tolerated  Butter, margarine, oils and salad dressings without seeds  --Foods to avoid:  Whole-wheat or whole-grain breads, cereals and pasta  Brown or wild rice and other whole grains, such as oats, kasha, barley and quinoa  Dried fruits and prune juice  Raw fruit, including those with seeds, skin or membranes, such as berries  Raw or undercooked vegetables, including corn  Dried beans, peas and lentils  Seeds and nuts and foods containing them, including peanut butter and other nut butters  Coconut  Popcorn     WOUND CARE;  Please place a dry dressing onto the wound. A dry 4x4 gauze should be placed into wound bed with a sterile q-tip. Please place dry 4x4 gauze overtop the wound and secure with paper tape. Please change packing once daily.     General Discharge Instructions:  Please resume all regular home medications unless specifically advised n ot to take a particular medication. Also, please take any new medications as prescribed.  Please get plenty of rest, continue to ambulate several times per day, and drink adequate amounts of fluids. Avoid lifting weights greater than 5-10 lbs until you follow-up with your surgeon, who will instruct you further regarding activity restrictions.  Avoid driving or operating heavy machinery while taking pain medications.  YOU WERE PRESCRIBED oxycodone.     ************************************************************************************    Please follow-up with Dr. Villegas in 2 weeks from discharge and Primary Care Provider (PCP) as advised. Call the office to schedule an appt.  Incision Care:  *Please call your doctor or nurse practitioner if you have increased pain, swelling, redness, or drainage from the incision site.  *Avoid swimming and baths until your follow-up appointment.  *You may shower, and wash surgical incisions with a mild soap and warm water. Gently pat the area dry.    Warning Signs:  Please call your doctor if you experience the following:  *You experience new chest pain, pressure, squeezing or tightness.  *New or worsening cough, shortness of breath, or wheeze.  *If you are vomiting and cannot keep down fluids or your medications.  *You are getting dehydrated due to continued vomiting, diarrhea, or other reasons. Signs of dehydration include dry mouth, rapid heartbeat, or feeling dizzy or faint when standing.  *You see blood or dark/black material when you vomit or have a bowel movement.  *Your pain is not improving within 8-12 hours or is not gone within 24 hours. Call or return immediately if your pain is getting worse, changes location, or moves to your chest or back.  *You have shaking chills, or fever greater than 101.5 degrees Fahrenheit or 38 degrees Celsius.  *Any change in your symptoms, or any new symptoms that concern you.    Please continue a LOW-FIBER DIET. Listed below are some foods you may eat and those you should avoid.   --Allowed foods:  White bread without nuts and seeds  White rice, plain white pasta, and crackers  Refined hot cereals, such as Cream of Wheat, or cold cereals with less than 1 gram of fiber per serving  Pancakes or waffles made from white refined flour  Most canned or well-cooked vegetables and fruits without skins or seeds  Fruit and vegetable juice with little or no pulp, fruit-flavored drinks, and flavored casillas  Tender meat, poultry, fish, eggs and tofu  Milk and foods made from milk — such as yogurt, pudding, ice cream, cheeses and sour cream — if tolerated  Butter, margarine, oils and salad dressings without seeds  --Foods to avoid:  Whole-wheat or whole-grain breads, cereals and pasta  Brown or wild rice and other whole grains, such as oats, kasha, barley and quinoa  Dried fruits and prune juice  Raw fruit, including those with seeds, skin or membranes, such as berries  Raw or undercooked vegetables, including corn  Dried beans, peas and lentils  Seeds and nuts and foods containing them, including peanut butter and other nut butters  Coconut  Popcorn

## 2023-07-19 NOTE — PHYSICAL THERAPY INITIAL EVALUATION ADULT - GENERAL OBSERVATIONS, REHAB EVAL
PT IE completed. Chart reviewed. Patient without complaints of pain at rest, agreeable to PT. Patient received semi-supine, NAD, +(L)IV, +abd dressing C/D/I, +ho, +B/L SCDs, TRIP Jarvis cleared patient for treatment.

## 2023-07-19 NOTE — PHYSICAL THERAPY INITIAL EVALUATION ADULT - PERTINENT HX OF CURRENT PROBLEM, REHAB EVAL
57 year old male with PMHx of extensive colonic diverticulitis and rectal bleeding, HTN, HLD, JOSE and nephrolithiasis. PSHx hernia repair, total abdominal colectomy with ileoproctostomy (Dr. Villegas 04/25/23) c/b small leak RTOR for open creation of DLI (5/2/23). Now presents for ileostomy reversal (7/18). Please refer to H&P on [place] for remaining.

## 2023-07-19 NOTE — DISCHARGE NOTE PROVIDER - HOSPITAL COURSE
57 year old male with PMHx of extensive colonic diverticulitis, HTN, HLD, JOSE and nephrolithiasis PSHx hernia repair, total abdominal colectomy with ileoproctostomy (Dr. Villegas 04/25/23) c/b small leak RTOR for open creation of DLI (5/2/23). Now s/p ileostomy reversal (7/18). PT was consulted and evaluated with recommendations of ___. Patient was educated on wound care, to place wet to dry dressings on the surgical site. His postoperative course was unremarkable with advancement of diet w/o nausea or vomtiting, return of bowel function, ambulating, passing trial of void, and pain control. On day of discharge patient was stable to be d/c'd home.      INCOMPLETE; last updated 7/19 57 year old male with PMHx of extensive colonic diverticulitis, HTN, HLD, JOSE and nephrolithiasis PSHx hernia repair, total abdominal colectomy with ileoproctostomy (Dr. Villegas 04/25/23) c/b small leak RTOR for open creation of DLI (5/2/23). Now s/p ileostomy reversal (7/18). PT was consulted and evaluated with recommendations of home with no PT needs. Patient was educated on wound care, to place wet to dry dressings on the surgical site. Patient developed an ileus which was confirmed with abdominal X-ray and resolved on POD 4. His postoperative course was unremarkable with advancement of diet w/o nausea or vomtiting, return of bowel function, ambulating, passing trial of void, and pain control. On day of discharge patient was stable to be d/c'd home.   57 year old male with PMHx of extensive colonic diverticulitis, HTN, HLD, JOSE and nephrolithiasis PSHx hernia repair, total abdominal colectomy with ileoproctostomy (Dr. Villegas 04/25/23) c/b small leak RTOR for open creation of DLI (5/2/23). Now s/p ileostomy reversal (7/18). PT was consulted and evaluated with recommendations of home with no PT needs. Patient was educated on wound care -change ileostomy reversal site daily (dry 4x4 gauze packing with overlying dry gauze and paper tape to secure). Patient developed an ileus which was confirmed with abdominal X-ray and resolved on POD 4. His postoperative course was unremarkable with advancement of diet w/o nausea or vomiting, return of bowel function, ambulating, passing trial of void, and pain control. On day of discharge patient was stable to be discharged home.

## 2023-07-19 NOTE — PROGRESS NOTE ADULT - SUBJECTIVE AND OBJECTIVE BOX
STATUS POST:  7/18: Ileostomy reversal    ON; S/p ileostomy reversal, POC wnl,      SUBJECTIVE: Patient seen and examined bedside by chief resident, patient c/o incisional pain near site. Tolerating ho, -n/-v/-f/-bm. Patient min OOBA. Denies sob/cat/dizziness/ha.    heparin   Injectable 5000 Unit(s) SubCutaneous every 8 hours      Vital Signs Last 24 Hrs  T(C): 36.9 (19 Jul 2023 04:33), Max: 36.9 (19 Jul 2023 04:33)  T(F): 98.4 (19 Jul 2023 04:33), Max: 98.4 (19 Jul 2023 04:33)  HR: 63 (19 Jul 2023 04:33) (56 - 90)  BP: 128/80 (19 Jul 2023 04:33) (105/77 - 152/67)  BP(mean): 87 (18 Jul 2023 22:00) (85 - 105)  RR: 18 (19 Jul 2023 04:33) (13 - 29)  SpO2: 97% (19 Jul 2023 04:33) (97% - 100%)    Parameters below as of 19 Jul 2023 04:33  Patient On (Oxygen Delivery Method): room air      I&O's Detail    18 Jul 2023 07:01  -  19 Jul 2023 07:00  --------------------------------------------------------  IN:    IV PiggyBack: 100 mL    Lactated Ringers: 980 mL    Oral Fluid: 240 mL  Total IN: 1320 mL    OUT:    Indwelling Catheter - Urethral (mL): 285 mL  Total OUT: 285 mL    Total NET: 1035 mL          General: NAD, resting comfortably in bed  C/V: NSR  Pulm: Nonlabored breathing, no respiratory distress, room air  Abd: soft, mildly distended, appropriately ttp near incisional site. No rebound or guarding.  Incisions; c/d/i, wound packing  Ho; yellow urine  Extrem: WWP, no edema, SCDs in place        LABS:                        9.6    7.07  )-----------( 415      ( 19 Jul 2023 05:30 )             31.0                 RADIOLOGY & ADDITIONAL STUDIES:

## 2023-07-19 NOTE — PHYSICAL THERAPY INITIAL EVALUATION ADULT - ADDITIONAL COMMENTS
Patient reports previously independent with most ADLs/IADLs prior to admission. No HHA. Denies history of mechanical falls within the past 6 months. Patient endorses upon initial discharge home from [facility] in April 2023, patient was utilizing rollator 2/2 symptoms of fatigue and dizziness. Patient currently still owns rollator and reports his wife is currently working from home and available to assist patient as needed upon discharge from [facility].

## 2023-07-19 NOTE — DISCHARGE NOTE PROVIDER - NSDCCPCAREPLAN_GEN_ALL_CORE_FT
PRINCIPAL DISCHARGE DIAGNOSIS  Diagnosis: Diverticulitis, colon  Assessment and Plan of Treatment:

## 2023-07-19 NOTE — DISCHARGE NOTE PROVIDER - NSDCFUADDAPPT_GEN_ALL_CORE_FT
Please schedule a followup with your primary care provider within 1 week Please schedule a followup with your primary care provider within 1 week.

## 2023-07-19 NOTE — DISCHARGE NOTE PROVIDER - CARE PROVIDER_API CALL
Onesimo Villegas  Surgery  [facility]  [place] [address]  Phone: [phone]  Fax: [phone]  Follow Up Time:

## 2023-07-19 NOTE — PHYSICAL THERAPY INITIAL EVALUATION ADULT - THERAPY FREQUENCY, PT EVAL
1-2 more PT sessions; Patient educated on frequency of inpatient physical therapy at [facility], patient verbalized understanding.

## 2023-07-19 NOTE — DISCHARGE NOTE PROVIDER - NSDCMRMEDTOKEN_GEN_ALL_CORE_FT
DilTIAZem (Eqv-Cardizem CD) 360 mg/24 hours oral capsule, extended release: 1 orally once a day  omeprazole 40 mg oral delayed release capsule: 1 orally once a day  triamterene-hydrochlorothiazide 37.5 mg-25 mg oral capsule: 1 orally once a day  TriCor 160 mg oral tablet: 1 orally once a day   DilTIAZem (Eqv-Cardizem CD) 360 mg/24 hours oral capsule, extended release: 1 orally once a day  omeprazole 40 mg oral delayed release capsule: 1 orally once a day  Oxaydo 5 mg oral tablet: 1 tab(s) orally every 4 hours As needed Severe Pain (7 - 10)  triamterene-hydrochlorothiazide 37.5 mg-25 mg oral capsule: 1 orally once a day  TriCor 160 mg oral tablet: 1 orally once a day   omeprazole 40 mg oral delayed release capsule: 1 orally once a day  oxyCODONE 5 mg oral tablet: 1 tab(s) orally every 6 hours as needed for  severe pain MDD: 4 tablets  TriCor 160 mg oral tablet: 1 orally once a day

## 2023-07-19 NOTE — DISCHARGE NOTE PROVIDER - DETAILS OF MALNUTRITION DIAGNOSIS/DIAGNOSES
This patient has been assessed with a concern for Malnutrition and was treated during this hospitalization for the following Nutrition diagnosis/diagnoses:     -  07/24/2023: Severe protein-calorie malnutrition

## 2023-07-20 LAB
ANION GAP SERPL CALC-SCNC: 10 MMOL/L — SIGNIFICANT CHANGE UP (ref 5–17)
BUN SERPL-MCNC: 16 MG/DL — SIGNIFICANT CHANGE UP (ref 7–23)
CALCIUM SERPL-MCNC: 8.9 MG/DL — SIGNIFICANT CHANGE UP (ref 8.4–10.5)
CHLORIDE SERPL-SCNC: 101 MMOL/L — SIGNIFICANT CHANGE UP (ref 96–108)
CO2 SERPL-SCNC: 21 MMOL/L — LOW (ref 22–31)
CREAT SERPL-MCNC: 1.02 MG/DL — SIGNIFICANT CHANGE UP (ref 0.5–1.3)
EGFR: 85 ML/MIN/1.73M2 — SIGNIFICANT CHANGE UP
GLUCOSE SERPL-MCNC: 108 MG/DL — HIGH (ref 70–99)
HCT VFR BLD CALC: 32 % — LOW (ref 39–50)
HGB BLD-MCNC: 9.8 G/DL — LOW (ref 13–17)
MAGNESIUM SERPL-MCNC: 1.9 MG/DL — SIGNIFICANT CHANGE UP (ref 1.6–2.6)
MCHC RBC-ENTMCNC: 22.1 PG — LOW (ref 27–34)
MCHC RBC-ENTMCNC: 30.6 GM/DL — LOW (ref 32–36)
MCV RBC AUTO: 72.2 FL — LOW (ref 80–100)
NRBC # BLD: 0 /100 WBCS — SIGNIFICANT CHANGE UP (ref 0–0)
PHOSPHATE SERPL-MCNC: 3.3 MG/DL — SIGNIFICANT CHANGE UP (ref 2.5–4.5)
PLATELET # BLD AUTO: 424 K/UL — HIGH (ref 150–400)
POTASSIUM SERPL-MCNC: 3.7 MMOL/L — SIGNIFICANT CHANGE UP (ref 3.5–5.3)
POTASSIUM SERPL-SCNC: 3.7 MMOL/L — SIGNIFICANT CHANGE UP (ref 3.5–5.3)
RBC # BLD: 4.43 M/UL — SIGNIFICANT CHANGE UP (ref 4.2–5.8)
RBC # FLD: 24.6 % — HIGH (ref 10.3–14.5)
SODIUM SERPL-SCNC: 132 MMOL/L — LOW (ref 135–145)
WBC # BLD: 8.05 K/UL — SIGNIFICANT CHANGE UP (ref 3.8–10.5)
WBC # FLD AUTO: 8.05 K/UL — SIGNIFICANT CHANGE UP (ref 3.8–10.5)

## 2023-07-20 RX ORDER — ACETAMINOPHEN 500 MG
650 TABLET ORAL EVERY 6 HOURS
Refills: 0 | Status: DISCONTINUED | OUTPATIENT
Start: 2023-07-20 | End: 2023-07-25

## 2023-07-20 RX ORDER — POTASSIUM CHLORIDE 20 MEQ
40 PACKET (EA) ORAL ONCE
Refills: 0 | Status: COMPLETED | OUTPATIENT
Start: 2023-07-20 | End: 2023-07-20

## 2023-07-20 RX ORDER — LIDOCAINE 4 G/100G
1 CREAM TOPICAL ONCE
Refills: 0 | Status: COMPLETED | OUTPATIENT
Start: 2023-07-20 | End: 2023-07-20

## 2023-07-20 RX ORDER — ONDANSETRON 8 MG/1
4 TABLET, FILM COATED ORAL ONCE
Refills: 0 | Status: COMPLETED | OUTPATIENT
Start: 2023-07-20 | End: 2023-07-20

## 2023-07-20 RX ORDER — PANTOPRAZOLE SODIUM 20 MG/1
40 TABLET, DELAYED RELEASE ORAL
Refills: 0 | Status: DISCONTINUED | OUTPATIENT
Start: 2023-07-21 | End: 2023-07-25

## 2023-07-20 RX ORDER — PANTOPRAZOLE SODIUM 20 MG/1
40 TABLET, DELAYED RELEASE ORAL ONCE
Refills: 0 | Status: COMPLETED | OUTPATIENT
Start: 2023-07-20 | End: 2023-07-20

## 2023-07-20 RX ADMIN — OXYCODONE HYDROCHLORIDE 5 MILLIGRAM(S): 5 TABLET ORAL at 11:10

## 2023-07-20 RX ADMIN — HEPARIN SODIUM 5000 UNIT(S): 5000 INJECTION INTRAVENOUS; SUBCUTANEOUS at 22:23

## 2023-07-20 RX ADMIN — Medication 650 MILLIGRAM(S): at 19:54

## 2023-07-20 RX ADMIN — HEPARIN SODIUM 5000 UNIT(S): 5000 INJECTION INTRAVENOUS; SUBCUTANEOUS at 05:41

## 2023-07-20 RX ADMIN — ONDANSETRON 4 MILLIGRAM(S): 8 TABLET, FILM COATED ORAL at 14:15

## 2023-07-20 RX ADMIN — Medication 650 MILLIGRAM(S): at 10:38

## 2023-07-20 RX ADMIN — OXYCODONE HYDROCHLORIDE 5 MILLIGRAM(S): 5 TABLET ORAL at 19:23

## 2023-07-20 RX ADMIN — OXYCODONE HYDROCHLORIDE 5 MILLIGRAM(S): 5 TABLET ORAL at 10:40

## 2023-07-20 RX ADMIN — HEPARIN SODIUM 5000 UNIT(S): 5000 INJECTION INTRAVENOUS; SUBCUTANEOUS at 14:15

## 2023-07-20 RX ADMIN — Medication 145 MILLIGRAM(S): at 12:39

## 2023-07-20 RX ADMIN — LIDOCAINE 1 PATCH: 4 CREAM TOPICAL at 12:40

## 2023-07-20 RX ADMIN — PANTOPRAZOLE SODIUM 40 MILLIGRAM(S): 20 TABLET, DELAYED RELEASE ORAL at 14:15

## 2023-07-20 RX ADMIN — Medication 40 MILLIEQUIVALENT(S): at 12:40

## 2023-07-20 NOTE — PROGRESS NOTE ADULT - SUBJECTIVE AND OBJECTIVE BOX
INTERVAL HPI/OVERNIGHT EVENTS: another loose mucous brown BM. slava CLD.     STATUS POST:  7/18: Ileostomy reversal    POST OPERATIVE DAY #: 2    SUBJECTIVE: Pt seen and examined at bedside this am by surgery team. No acute complaints. +F/+BMs. Tolerating diet, pain well controlled. Denies f/n/v/cp/sob.    MEDICATIONS  (STANDING):  acetaminophen     Tablet .. 650 milliGRAM(s) Oral every 6 hours  dextrose 5% + sodium chloride 0.45%. 1000 milliLiter(s) (40 mL/Hr) IV Continuous <Continuous>  fenofibrate Tablet 145 milliGRAM(s) Oral daily  heparin   Injectable 5000 Unit(s) SubCutaneous every 8 hours    MEDICATIONS  (PRN):  oxyCODONE    IR 2.5 milliGRAM(s) Oral every 4 hours PRN Moderate Pain (4 - 6)  oxyCODONE    IR 5 milliGRAM(s) Oral every 4 hours PRN Severe Pain (7 - 10)    Vital Signs Last 24 Hrs  T(C): 36.8 (20 Jul 2023 05:07), Max: 36.9 (19 Jul 2023 20:15)  T(F): 98.2 (20 Jul 2023 05:07), Max: 98.5 (19 Jul 2023 20:15)  HR: 96 (20 Jul 2023 05:07) (65 - 96)  BP: 117/81 (20 Jul 2023 05:07) (100/60 - 120/75)  BP(mean): --  RR: 19 (20 Jul 2023 05:07) (17 - 19)  SpO2: 93% (20 Jul 2023 05:07) (93% - 96%)    Parameters below as of 19 Jul 2023 20:15  Patient On (Oxygen Delivery Method): room air    PHYSICAL EXAM:    Constitutional: A&Ox3, NAD    Respiratory: non labored breathing, no respiratory distress    Cardiovascular: NSR, RRR    Gastrointestinal: abdomen soft, nd, appropriately ttp to surgical site. Dressing changed to wet to dry packing. c/d/i. No rebound or guarding     Genitourinary: Mcmanus in place w/ clear yellow urine     Extremities: wwp, no calf tenderness or edema. SCDs in place     I&O's Detail    19 Jul 2023 07:01  -  20 Jul 2023 07:00  --------------------------------------------------------  IN:    dextrose 5% + sodium chloride 0.45%: 910 mL    IV PiggyBack: 200 mL    Oral Fluid: 400 mL  Total IN: 1510 mL    OUT:    Indwelling Catheter - Urethral (mL): 1025 mL  Total OUT: 1025 mL    Total NET: 485 mL          LABS:                        9.8    8.05  )-----------( 424      ( 20 Jul 2023 05:30 )             32.0     07-20    x   |  101  |  16  ----------------------------<  108<H>  3.7   |  x   |  1.02    Ca    8.9      20 Jul 2023 05:30  Phos  3.3     07-20  Mg     1.9     07-20        Urinalysis Basic - ( 20 Jul 2023 05:30 )    Color: x / Appearance: x / SG: x / pH: x  Gluc: 108 mg/dL / Ketone: x  / Bili: x / Urobili: x   Blood: x / Protein: x / Nitrite: x   Leuk Esterase: x / RBC: x / WBC x   Sq Epi: x / Non Sq Epi: x / Bacteria: x        RADIOLOGY & ADDITIONAL STUDIES:

## 2023-07-21 LAB
ANION GAP SERPL CALC-SCNC: 11 MMOL/L — SIGNIFICANT CHANGE UP (ref 5–17)
BUN SERPL-MCNC: 16 MG/DL — SIGNIFICANT CHANGE UP (ref 7–23)
CALCIUM SERPL-MCNC: 9.6 MG/DL — SIGNIFICANT CHANGE UP (ref 8.4–10.5)
CHLORIDE SERPL-SCNC: 105 MMOL/L — SIGNIFICANT CHANGE UP (ref 96–108)
CO2 SERPL-SCNC: 22 MMOL/L — SIGNIFICANT CHANGE UP (ref 22–31)
CREAT SERPL-MCNC: 0.92 MG/DL — SIGNIFICANT CHANGE UP (ref 0.5–1.3)
EGFR: 96 ML/MIN/1.73M2 — SIGNIFICANT CHANGE UP
GLUCOSE SERPL-MCNC: 132 MG/DL — HIGH (ref 70–99)
HCT VFR BLD CALC: 34.9 % — LOW (ref 39–50)
HGB BLD-MCNC: 10.7 G/DL — LOW (ref 13–17)
MAGNESIUM SERPL-MCNC: 1.8 MG/DL — SIGNIFICANT CHANGE UP (ref 1.6–2.6)
MCHC RBC-ENTMCNC: 22.6 PG — LOW (ref 27–34)
MCHC RBC-ENTMCNC: 30.7 GM/DL — LOW (ref 32–36)
MCV RBC AUTO: 73.8 FL — LOW (ref 80–100)
NRBC # BLD: 0 /100 WBCS — SIGNIFICANT CHANGE UP (ref 0–0)
PHOSPHATE SERPL-MCNC: 2.9 MG/DL — SIGNIFICANT CHANGE UP (ref 2.5–4.5)
PLATELET # BLD AUTO: 489 K/UL — HIGH (ref 150–400)
POTASSIUM SERPL-MCNC: 4.2 MMOL/L — SIGNIFICANT CHANGE UP (ref 3.5–5.3)
POTASSIUM SERPL-SCNC: 4.2 MMOL/L — SIGNIFICANT CHANGE UP (ref 3.5–5.3)
RBC # BLD: 4.73 M/UL — SIGNIFICANT CHANGE UP (ref 4.2–5.8)
RBC # FLD: 24.4 % — HIGH (ref 10.3–14.5)
SODIUM SERPL-SCNC: 138 MMOL/L — SIGNIFICANT CHANGE UP (ref 135–145)
WBC # BLD: 6.97 K/UL — SIGNIFICANT CHANGE UP (ref 3.8–10.5)
WBC # FLD AUTO: 6.97 K/UL — SIGNIFICANT CHANGE UP (ref 3.8–10.5)

## 2023-07-21 PROCEDURE — 74018 RADEX ABDOMEN 1 VIEW: CPT | Mod: 26

## 2023-07-21 RX ORDER — SODIUM CHLORIDE 9 MG/ML
500 INJECTION, SOLUTION INTRAVENOUS ONCE
Refills: 0 | Status: COMPLETED | OUTPATIENT
Start: 2023-07-21 | End: 2023-07-21

## 2023-07-21 RX ORDER — LIDOCAINE 4 G/100G
1 CREAM TOPICAL ONCE
Refills: 0 | Status: COMPLETED | OUTPATIENT
Start: 2023-07-21 | End: 2023-07-21

## 2023-07-21 RX ORDER — SODIUM CHLORIDE 9 MG/ML
1000 INJECTION, SOLUTION INTRAVENOUS
Refills: 0 | Status: DISCONTINUED | OUTPATIENT
Start: 2023-07-21 | End: 2023-07-24

## 2023-07-21 RX ORDER — SODIUM CHLORIDE 9 MG/ML
1000 INJECTION, SOLUTION INTRAVENOUS ONCE
Refills: 0 | Status: COMPLETED | OUTPATIENT
Start: 2023-07-21 | End: 2023-07-21

## 2023-07-21 RX ORDER — ONDANSETRON 8 MG/1
4 TABLET, FILM COATED ORAL EVERY 6 HOURS
Refills: 0 | Status: DISCONTINUED | OUTPATIENT
Start: 2023-07-21 | End: 2023-07-25

## 2023-07-21 RX ORDER — LIDOCAINE 4 G/100G
1 CREAM TOPICAL DAILY
Refills: 0 | Status: DISCONTINUED | OUTPATIENT
Start: 2023-07-21 | End: 2023-07-25

## 2023-07-21 RX ORDER — MAGNESIUM SULFATE 500 MG/ML
1 VIAL (ML) INJECTION ONCE
Refills: 0 | Status: COMPLETED | OUTPATIENT
Start: 2023-07-21 | End: 2023-07-21

## 2023-07-21 RX ADMIN — Medication 650 MILLIGRAM(S): at 13:35

## 2023-07-21 RX ADMIN — LIDOCAINE 1 PATCH: 4 CREAM TOPICAL at 18:51

## 2023-07-21 RX ADMIN — HEPARIN SODIUM 5000 UNIT(S): 5000 INJECTION INTRAVENOUS; SUBCUTANEOUS at 13:35

## 2023-07-21 RX ADMIN — SODIUM CHLORIDE 120 MILLILITER(S): 9 INJECTION, SOLUTION INTRAVENOUS at 09:19

## 2023-07-21 RX ADMIN — OXYCODONE HYDROCHLORIDE 5 MILLIGRAM(S): 5 TABLET ORAL at 23:18

## 2023-07-21 RX ADMIN — Medication 650 MILLIGRAM(S): at 01:08

## 2023-07-21 RX ADMIN — LIDOCAINE 1 PATCH: 4 CREAM TOPICAL at 00:57

## 2023-07-21 RX ADMIN — SODIUM CHLORIDE 500 MILLILITER(S): 9 INJECTION, SOLUTION INTRAVENOUS at 03:59

## 2023-07-21 RX ADMIN — Medication 650 MILLIGRAM(S): at 07:03

## 2023-07-21 RX ADMIN — HEPARIN SODIUM 5000 UNIT(S): 5000 INJECTION INTRAVENOUS; SUBCUTANEOUS at 07:03

## 2023-07-21 RX ADMIN — OXYCODONE HYDROCHLORIDE 5 MILLIGRAM(S): 5 TABLET ORAL at 22:18

## 2023-07-21 RX ADMIN — HEPARIN SODIUM 5000 UNIT(S): 5000 INJECTION INTRAVENOUS; SUBCUTANEOUS at 22:17

## 2023-07-21 RX ADMIN — OXYCODONE HYDROCHLORIDE 5 MILLIGRAM(S): 5 TABLET ORAL at 17:24

## 2023-07-21 RX ADMIN — LIDOCAINE 1 PATCH: 4 CREAM TOPICAL at 07:02

## 2023-07-21 RX ADMIN — OXYCODONE HYDROCHLORIDE 5 MILLIGRAM(S): 5 TABLET ORAL at 00:40

## 2023-07-21 RX ADMIN — LIDOCAINE 1 PATCH: 4 CREAM TOPICAL at 07:03

## 2023-07-21 RX ADMIN — Medication 650 MILLIGRAM(S): at 18:59

## 2023-07-21 RX ADMIN — PANTOPRAZOLE SODIUM 40 MILLIGRAM(S): 20 TABLET, DELAYED RELEASE ORAL at 07:03

## 2023-07-21 RX ADMIN — OXYCODONE HYDROCHLORIDE 5 MILLIGRAM(S): 5 TABLET ORAL at 01:40

## 2023-07-21 RX ADMIN — SODIUM CHLORIDE 100 MILLILITER(S): 9 INJECTION, SOLUTION INTRAVENOUS at 07:00

## 2023-07-21 RX ADMIN — Medication 145 MILLIGRAM(S): at 13:36

## 2023-07-21 RX ADMIN — SODIUM CHLORIDE 1000 MILLILITER(S): 9 INJECTION, SOLUTION INTRAVENOUS at 17:24

## 2023-07-21 RX ADMIN — ONDANSETRON 4 MILLIGRAM(S): 8 TABLET, FILM COATED ORAL at 09:28

## 2023-07-21 RX ADMIN — ONDANSETRON 4 MILLIGRAM(S): 8 TABLET, FILM COATED ORAL at 17:24

## 2023-07-21 RX ADMIN — Medication 100 GRAM(S): at 13:36

## 2023-07-21 NOTE — PROGRESS NOTE ADULT - SUBJECTIVE AND OBJECTIVE BOX
SUBJECTIVE:  Patient was seen at bedside this AM with chief resident. Patient is doing well this AM, currently denying pain. Patient reports one episode of flatus and one loose BM overnight. Denies nausea or vomiting but reports sputum spit-up.    MEDICATIONS  (STANDING):  acetaminophen     Tablet .. 650 milliGRAM(s) Oral every 6 hours  dextrose 5% + sodium chloride 0.45%. 1000 milliLiter(s) (120 mL/Hr) IV Continuous <Continuous>  fenofibrate Tablet 145 milliGRAM(s) Oral daily  heparin   Injectable 5000 Unit(s) SubCutaneous every 8 hours  pantoprazole    Tablet 40 milliGRAM(s) Oral before breakfast    MEDICATIONS  (PRN):  ondansetron Injectable 4 milliGRAM(s) IV Push every 6 hours PRN Nausea and/or Vomiting  oxyCODONE    IR 2.5 milliGRAM(s) Oral every 4 hours PRN Moderate Pain (4 - 6)  oxyCODONE    IR 5 milliGRAM(s) Oral every 4 hours PRN Severe Pain (7 - 10)      Vital Signs Last 24 Hrs  T(C): 36.7 (21 Jul 2023 13:33), Max: 37.7 (21 Jul 2023 00:21)  T(F): 98 (21 Jul 2023 13:33), Max: 99.9 (21 Jul 2023 00:21)  HR: 87 (21 Jul 2023 13:33) (76 - 98)  BP: 124/83 (21 Jul 2023 13:33) (124/83 - 133/92)  BP(mean): 96 (21 Jul 2023 09:17) (96 - 96)  RR: 17 (21 Jul 2023 13:33) (17 - 18)  SpO2: 95% (21 Jul 2023 13:33) (93% - 96%)    Parameters below as of 21 Jul 2023 13:33  Patient On (Oxygen Delivery Method): room air        Physical Exam:  General: NAD, resting comfortably in bed  Pulmonary: Nonlabored breathing, no respiratory distress  Cardiovascular: NSR  Abdominal: soft, NT/ND, ileostomy reversal site dressing changed to dry guaze  Extremities: WWP, normal strength  Neuro: A/O x 3, CNs II-XII grossly intact, no focal deficits, normal motor/sensation  Pulses: palpable distal pulses    I&O's Summary    20 Jul 2023 07:01  -  21 Jul 2023 07:00  --------------------------------------------------------  IN: 1280 mL / OUT: 376 mL / NET: 904 mL    21 Jul 2023 07:01  -  21 Jul 2023 13:54  --------------------------------------------------------  IN: 450 mL / OUT: 0 mL / NET: 450 mL        LABS:                        9.8    8.05  )-----------( 424      ( 20 Jul 2023 05:30 )             32.0     07-21    138  |  105  |  16  ----------------------------<  132<H>  4.2   |  22  |  0.92    Ca    9.6      21 Jul 2023 06:05  Phos  2.9     07-21  Mg     1.8     07-21        Urinalysis Basic - ( 21 Jul 2023 06:05 )    Color: x / Appearance: x / SG: x / pH: x  Gluc: 132 mg/dL / Ketone: x  / Bili: x / Urobili: x   Blood: x / Protein: x / Nitrite: x   Leuk Esterase: x / RBC: x / WBC x   Sq Epi: x / Non Sq Epi: x / Bacteria: x      CAPILLARY BLOOD GLUCOSE            RADIOLOGY & ADDITIONAL STUDIES:

## 2023-07-22 LAB
ANION GAP SERPL CALC-SCNC: 8 MMOL/L — SIGNIFICANT CHANGE UP (ref 5–17)
BUN SERPL-MCNC: 12 MG/DL — SIGNIFICANT CHANGE UP (ref 7–23)
CALCIUM SERPL-MCNC: 8.9 MG/DL — SIGNIFICANT CHANGE UP (ref 8.4–10.5)
CHLORIDE SERPL-SCNC: 106 MMOL/L — SIGNIFICANT CHANGE UP (ref 96–108)
CO2 SERPL-SCNC: 22 MMOL/L — SIGNIFICANT CHANGE UP (ref 22–31)
CREAT SERPL-MCNC: 0.81 MG/DL — SIGNIFICANT CHANGE UP (ref 0.5–1.3)
EGFR: 102 ML/MIN/1.73M2 — SIGNIFICANT CHANGE UP
GLUCOSE SERPL-MCNC: 116 MG/DL — HIGH (ref 70–99)
HCT VFR BLD CALC: 31.7 % — LOW (ref 39–50)
HGB BLD-MCNC: 9.9 G/DL — LOW (ref 13–17)
MAGNESIUM SERPL-MCNC: 1.9 MG/DL — SIGNIFICANT CHANGE UP (ref 1.6–2.6)
MCHC RBC-ENTMCNC: 23.2 PG — LOW (ref 27–34)
MCHC RBC-ENTMCNC: 31.2 GM/DL — LOW (ref 32–36)
MCV RBC AUTO: 74.4 FL — LOW (ref 80–100)
NRBC # BLD: 0 /100 WBCS — SIGNIFICANT CHANGE UP (ref 0–0)
PHOSPHATE SERPL-MCNC: 2.1 MG/DL — LOW (ref 2.5–4.5)
PLATELET # BLD AUTO: 418 K/UL — HIGH (ref 150–400)
POTASSIUM SERPL-MCNC: 4 MMOL/L — SIGNIFICANT CHANGE UP (ref 3.5–5.3)
POTASSIUM SERPL-SCNC: 4 MMOL/L — SIGNIFICANT CHANGE UP (ref 3.5–5.3)
RBC # BLD: 4.26 M/UL — SIGNIFICANT CHANGE UP (ref 4.2–5.8)
RBC # FLD: 23.9 % — HIGH (ref 10.3–14.5)
SODIUM SERPL-SCNC: 136 MMOL/L — SIGNIFICANT CHANGE UP (ref 135–145)
WBC # BLD: 5.51 K/UL — SIGNIFICANT CHANGE UP (ref 3.8–10.5)
WBC # FLD AUTO: 5.51 K/UL — SIGNIFICANT CHANGE UP (ref 3.8–10.5)

## 2023-07-22 RX ORDER — SODIUM CHLORIDE 9 MG/ML
1000 INJECTION, SOLUTION INTRAVENOUS ONCE
Refills: 0 | Status: COMPLETED | OUTPATIENT
Start: 2023-07-22 | End: 2023-07-22

## 2023-07-22 RX ADMIN — SODIUM CHLORIDE 120 MILLILITER(S): 9 INJECTION, SOLUTION INTRAVENOUS at 02:37

## 2023-07-22 RX ADMIN — LIDOCAINE 1 PATCH: 4 CREAM TOPICAL at 15:25

## 2023-07-22 RX ADMIN — SODIUM CHLORIDE 120 MILLILITER(S): 9 INJECTION, SOLUTION INTRAVENOUS at 10:11

## 2023-07-22 RX ADMIN — SODIUM CHLORIDE 120 MILLILITER(S): 9 INJECTION, SOLUTION INTRAVENOUS at 19:03

## 2023-07-22 RX ADMIN — HEPARIN SODIUM 5000 UNIT(S): 5000 INJECTION INTRAVENOUS; SUBCUTANEOUS at 15:21

## 2023-07-22 RX ADMIN — Medication 650 MILLIGRAM(S): at 13:22

## 2023-07-22 RX ADMIN — Medication 650 MILLIGRAM(S): at 07:03

## 2023-07-22 RX ADMIN — HEPARIN SODIUM 5000 UNIT(S): 5000 INJECTION INTRAVENOUS; SUBCUTANEOUS at 22:17

## 2023-07-22 RX ADMIN — SODIUM CHLORIDE 1000 MILLILITER(S): 9 INJECTION, SOLUTION INTRAVENOUS at 10:11

## 2023-07-22 RX ADMIN — LIDOCAINE 1 PATCH: 4 CREAM TOPICAL at 01:10

## 2023-07-22 RX ADMIN — Medication 650 MILLIGRAM(S): at 01:10

## 2023-07-22 RX ADMIN — OXYCODONE HYDROCHLORIDE 5 MILLIGRAM(S): 5 TABLET ORAL at 19:05

## 2023-07-22 RX ADMIN — OXYCODONE HYDROCHLORIDE 5 MILLIGRAM(S): 5 TABLET ORAL at 20:05

## 2023-07-22 RX ADMIN — OXYCODONE HYDROCHLORIDE 5 MILLIGRAM(S): 5 TABLET ORAL at 14:21

## 2023-07-22 RX ADMIN — LIDOCAINE 1 PATCH: 4 CREAM TOPICAL at 08:26

## 2023-07-22 RX ADMIN — Medication 145 MILLIGRAM(S): at 13:22

## 2023-07-22 RX ADMIN — Medication 650 MILLIGRAM(S): at 19:03

## 2023-07-22 RX ADMIN — HEPARIN SODIUM 5000 UNIT(S): 5000 INJECTION INTRAVENOUS; SUBCUTANEOUS at 07:03

## 2023-07-22 RX ADMIN — PANTOPRAZOLE SODIUM 40 MILLIGRAM(S): 20 TABLET, DELAYED RELEASE ORAL at 07:03

## 2023-07-22 RX ADMIN — OXYCODONE HYDROCHLORIDE 5 MILLIGRAM(S): 5 TABLET ORAL at 13:21

## 2023-07-22 NOTE — PROGRESS NOTE ADULT - SUBJECTIVE AND OBJECTIVE BOX
ON:       SUBJECTIVE: Pt seen and examined at bedside this am by ICU team. Patient is lying comfortably in bed with no complaints. Tolerating diet, pain well controlled with current regimen. Patient denies fever, nausea, vomiting, chest pain, and shortness of breath. Patient is passing gas and having bowel movements.       MEDICATIONS  (STANDING):  acetaminophen     Tablet .. 650 milliGRAM(s) Oral every 6 hours  dextrose 5% + sodium chloride 0.45%. 1000 milliLiter(s) (120 mL/Hr) IV Continuous <Continuous>  fenofibrate Tablet 145 milliGRAM(s) Oral daily  heparin   Injectable 5000 Unit(s) SubCutaneous every 8 hours  lidocaine   4% Patch 1 Patch Transdermal daily  pantoprazole    Tablet 40 milliGRAM(s) Oral before breakfast    MEDICATIONS  (PRN):  ondansetron Injectable 4 milliGRAM(s) IV Push every 6 hours PRN Nausea and/or Vomiting  oxyCODONE    IR 2.5 milliGRAM(s) Oral every 4 hours PRN Moderate Pain (4 - 6)  oxyCODONE    IR 5 milliGRAM(s) Oral every 4 hours PRN Severe Pain (7 - 10)      Drips:     ICU Vital Signs Last 24 Hrs  T(C): 36.6 (22 Jul 2023 05:13), Max: 37.4 (21 Jul 2023 23:46)  T(F): 97.8 (22 Jul 2023 05:13), Max: 99.4 (21 Jul 2023 23:46)  HR: 75 (22 Jul 2023 05:13) (67 - 88)  BP: 112/76 (22 Jul 2023 05:13) (112/76 - 135/75)  BP(mean): 96 (21 Jul 2023 09:17) (96 - 96)  ABP: --  ABP(mean): --  RR: 17 (22 Jul 2023 05:13) (17 - 17)  SpO2: 94% (22 Jul 2023 05:13) (94% - 96%)    O2 Parameters below as of 22 Jul 2023 05:13  Patient On (Oxygen Delivery Method): room air            Physical Exam:  General: NAD  HEENT: NC/AT, EOMI, PERRLA, normal hearing, no oral lesions, neck supple w/o LAD  Pulmonary: Nonlabored breathing, no respiratory distress, CTA-B  Cardiovascular: NSR, no murmurs  Abdominal: soft, NT/ND, +BS, no organomegaly  Extremities: WWP, 5/5 strength x 4, no clubbing/cyanosis/edema  Neuro: A/O x3, CNs II-XII grossly intact, normal motor/sensation, no focal deficits  Pulses: palpable distal pulses    Lines/tubes/drains:  Mcmanus:	      Vent settings:      I&O's Summary    20 Jul 2023 07:01  -  21 Jul 2023 07:00  --------------------------------------------------------  IN: 1280 mL / OUT: 376 mL / NET: 904 mL    21 Jul 2023 07:01  -  22 Jul 2023 06:42  --------------------------------------------------------  IN: 3480 mL / OUT: 450 mL / NET: 3030 mL        LABS:                        10.7   6.97  )-----------( 489      ( 21 Jul 2023 12:59 )             34.9     07-21    138  |  105  |  16  ----------------------------<  132<H>  4.2   |  22  |  0.92    Ca    9.6      21 Jul 2023 06:05  Phos  2.9     07-21  Mg     1.8     07-21        Urinalysis Basic - ( 21 Jul 2023 06:05 )    Color: x / Appearance: x / SG: x / pH: x  Gluc: 132 mg/dL / Ketone: x  / Bili: x / Urobili: x   Blood: x / Protein: x / Nitrite: x   Leuk Esterase: x / RBC: x / WBC x   Sq Epi: x / Non Sq Epi: x / Bacteria: x      CAPILLARY BLOOD GLUCOSE            Cultures:    RADIOLOGY & ADDITIONAL STUDIES:     ON:  -N/-V. feeling better. +F/-BM.     7/22: 1L LR bolus, Liquid diet, only hot tea, multiple liquid/ mucus Bms through the day, stomach handling 1/2 cup of hot tea at a time, not out of bed today due to pain.     SUBJECTIVE: Pt seen and examined at bedside this am by team. Patient is lying comfortably in bed with no complaints. Tolerating liquid diet, pain well controlled with current regimen. Patient denies fever, nausea, vomiting, chest pain, and shortness of breath. Patient is passing gas and having liquid, mucus like bowel movements. No well formed BMS at this time.       MEDICATIONS  (STANDING):  acetaminophen     Tablet .. 650 milliGRAM(s) Oral every 6 hours  dextrose 5% + sodium chloride 0.45%. 1000 milliLiter(s) (120 mL/Hr) IV Continuous <Continuous>  fenofibrate Tablet 145 milliGRAM(s) Oral daily  heparin   Injectable 5000 Unit(s) SubCutaneous every 8 hours  lidocaine   4% Patch 1 Patch Transdermal daily  pantoprazole    Tablet 40 milliGRAM(s) Oral before breakfast    MEDICATIONS  (PRN):  ondansetron Injectable 4 milliGRAM(s) IV Push every 6 hours PRN Nausea and/or Vomiting  oxyCODONE    IR 2.5 milliGRAM(s) Oral every 4 hours PRN Moderate Pain (4 - 6)  oxyCODONE    IR 5 milliGRAM(s) Oral every 4 hours PRN Severe Pain (7 - 10)      Drips:     ICU Vital Signs Last 24 Hrs  T(C): 36.6 (22 Jul 2023 05:13), Max: 37.4 (21 Jul 2023 23:46)  T(F): 97.8 (22 Jul 2023 05:13), Max: 99.4 (21 Jul 2023 23:46)  HR: 75 (22 Jul 2023 05:13) (67 - 88)  BP: 112/76 (22 Jul 2023 05:13) (112/76 - 135/75)  BP(mean): 96 (21 Jul 2023 09:17) (96 - 96)  ABP: --  ABP(mean): --  RR: 17 (22 Jul 2023 05:13) (17 - 17)  SpO2: 94% (22 Jul 2023 05:13) (94% - 96%)    O2 Parameters below as of 22 Jul 2023 05:13  Patient On (Oxygen Delivery Method): room air            Physical Exam:    General: NAD, resting comfortably in bed  Pulmonary: Nonlabored breathing, no respiratory distress  Cardiovascular: NSR  Abdominal: soft, NT/ND, ileostomy reversal site dressing changed to dry guaze  Extremities: WWP, normal strength  Neuro: A/O x 3, CNs II-XII grossly intact, no focal deficits, normal motor/sensation  Pulses: palpable distal pulses      Lines/tubes/drains:  Mcmanus:	      Vent settings:      I&O's Summary    20 Jul 2023 07:01  -  21 Jul 2023 07:00  --------------------------------------------------------  IN: 1280 mL / OUT: 376 mL / NET: 904 mL    21 Jul 2023 07:01  -  22 Jul 2023 06:42  --------------------------------------------------------  IN: 3480 mL / OUT: 450 mL / NET: 3030 mL        LABS:                        10.7   6.97  )-----------( 489      ( 21 Jul 2023 12:59 )             34.9     07-21    138  |  105  |  16  ----------------------------<  132<H>  4.2   |  22  |  0.92    Ca    9.6      21 Jul 2023 06:05  Phos  2.9     07-21  Mg     1.8     07-21        Urinalysis Basic - ( 21 Jul 2023 06:05 )    Color: x / Appearance: x / SG: x / pH: x  Gluc: 132 mg/dL / Ketone: x  / Bili: x / Urobili: x   Blood: x / Protein: x / Nitrite: x   Leuk Esterase: x / RBC: x / WBC x   Sq Epi: x / Non Sq Epi: x / Bacteria: x      CAPILLARY BLOOD GLUCOSE            Cultures:    RADIOLOGY & ADDITIONAL STUDIES:

## 2023-07-23 LAB
ANION GAP SERPL CALC-SCNC: 8 MMOL/L — SIGNIFICANT CHANGE UP (ref 5–17)
BUN SERPL-MCNC: 7 MG/DL — SIGNIFICANT CHANGE UP (ref 7–23)
CALCIUM SERPL-MCNC: 8.5 MG/DL — SIGNIFICANT CHANGE UP (ref 8.4–10.5)
CHLORIDE SERPL-SCNC: 103 MMOL/L — SIGNIFICANT CHANGE UP (ref 96–108)
CO2 SERPL-SCNC: 23 MMOL/L — SIGNIFICANT CHANGE UP (ref 22–31)
CREAT SERPL-MCNC: 0.83 MG/DL — SIGNIFICANT CHANGE UP (ref 0.5–1.3)
EGFR: 101 ML/MIN/1.73M2 — SIGNIFICANT CHANGE UP
GLUCOSE SERPL-MCNC: 112 MG/DL — HIGH (ref 70–99)
HCT VFR BLD CALC: 29.1 % — LOW (ref 39–50)
HGB BLD-MCNC: 9.1 G/DL — LOW (ref 13–17)
MAGNESIUM SERPL-MCNC: 1.4 MG/DL — LOW (ref 1.6–2.6)
MCHC RBC-ENTMCNC: 22.9 PG — LOW (ref 27–34)
MCHC RBC-ENTMCNC: 31.3 GM/DL — LOW (ref 32–36)
MCV RBC AUTO: 73.1 FL — LOW (ref 80–100)
NRBC # BLD: 0 /100 WBCS — SIGNIFICANT CHANGE UP (ref 0–0)
PHOSPHATE SERPL-MCNC: 2.5 MG/DL — SIGNIFICANT CHANGE UP (ref 2.5–4.5)
PLATELET # BLD AUTO: 425 K/UL — HIGH (ref 150–400)
POTASSIUM SERPL-MCNC: 3.8 MMOL/L — SIGNIFICANT CHANGE UP (ref 3.5–5.3)
POTASSIUM SERPL-SCNC: 3.8 MMOL/L — SIGNIFICANT CHANGE UP (ref 3.5–5.3)
RBC # BLD: 3.98 M/UL — LOW (ref 4.2–5.8)
RBC # FLD: 23.7 % — HIGH (ref 10.3–14.5)
SODIUM SERPL-SCNC: 134 MMOL/L — LOW (ref 135–145)
WBC # BLD: 4.24 K/UL — SIGNIFICANT CHANGE UP (ref 3.8–10.5)
WBC # FLD AUTO: 4.24 K/UL — SIGNIFICANT CHANGE UP (ref 3.8–10.5)

## 2023-07-23 RX ORDER — MAGNESIUM SULFATE 500 MG/ML
2 VIAL (ML) INJECTION EVERY 6 HOURS
Refills: 0 | Status: COMPLETED | OUTPATIENT
Start: 2023-07-23 | End: 2023-07-23

## 2023-07-23 RX ORDER — POTASSIUM PHOSPHATE, MONOBASIC POTASSIUM PHOSPHATE, DIBASIC 236; 224 MG/ML; MG/ML
15 INJECTION, SOLUTION INTRAVENOUS ONCE
Refills: 0 | Status: COMPLETED | OUTPATIENT
Start: 2023-07-23 | End: 2023-07-23

## 2023-07-23 RX ADMIN — LIDOCAINE 1 PATCH: 4 CREAM TOPICAL at 13:10

## 2023-07-23 RX ADMIN — POTASSIUM PHOSPHATE, MONOBASIC POTASSIUM PHOSPHATE, DIBASIC 62.5 MILLIMOLE(S): 236; 224 INJECTION, SOLUTION INTRAVENOUS at 11:19

## 2023-07-23 RX ADMIN — PANTOPRAZOLE SODIUM 40 MILLIGRAM(S): 20 TABLET, DELAYED RELEASE ORAL at 06:55

## 2023-07-23 RX ADMIN — Medication 650 MILLIGRAM(S): at 06:55

## 2023-07-23 RX ADMIN — HEPARIN SODIUM 5000 UNIT(S): 5000 INJECTION INTRAVENOUS; SUBCUTANEOUS at 14:03

## 2023-07-23 RX ADMIN — LIDOCAINE 1 PATCH: 4 CREAM TOPICAL at 00:34

## 2023-07-23 RX ADMIN — HEPARIN SODIUM 5000 UNIT(S): 5000 INJECTION INTRAVENOUS; SUBCUTANEOUS at 22:05

## 2023-07-23 RX ADMIN — Medication 25 GRAM(S): at 16:48

## 2023-07-23 RX ADMIN — HEPARIN SODIUM 5000 UNIT(S): 5000 INJECTION INTRAVENOUS; SUBCUTANEOUS at 06:55

## 2023-07-23 RX ADMIN — SODIUM CHLORIDE 120 MILLILITER(S): 9 INJECTION, SOLUTION INTRAVENOUS at 22:05

## 2023-07-23 RX ADMIN — Medication 25 GRAM(S): at 09:42

## 2023-07-23 RX ADMIN — LIDOCAINE 1 PATCH: 4 CREAM TOPICAL at 06:04

## 2023-07-23 RX ADMIN — SODIUM CHLORIDE 120 MILLILITER(S): 9 INJECTION, SOLUTION INTRAVENOUS at 16:49

## 2023-07-23 RX ADMIN — SODIUM CHLORIDE 120 MILLILITER(S): 9 INJECTION, SOLUTION INTRAVENOUS at 09:43

## 2023-07-23 RX ADMIN — Medication 650 MILLIGRAM(S): at 00:34

## 2023-07-23 NOTE — PROGRESS NOTE ADULT - SUBJECTIVE AND OBJECTIVE BOX
SUBJECTIVE:      MEDICATIONS  (STANDING):  acetaminophen     Tablet .. 650 milliGRAM(s) Oral every 6 hours  dextrose 5% + sodium chloride 0.45%. 1000 milliLiter(s) (120 mL/Hr) IV Continuous <Continuous>  fenofibrate Tablet 145 milliGRAM(s) Oral daily  heparin   Injectable 5000 Unit(s) SubCutaneous every 8 hours  lidocaine   4% Patch 1 Patch Transdermal daily  pantoprazole    Tablet 40 milliGRAM(s) Oral before breakfast    MEDICATIONS  (PRN):  ondansetron Injectable 4 milliGRAM(s) IV Push every 6 hours PRN Nausea and/or Vomiting  oxyCODONE    IR 2.5 milliGRAM(s) Oral every 4 hours PRN Moderate Pain (4 - 6)  oxyCODONE    IR 5 milliGRAM(s) Oral every 4 hours PRN Severe Pain (7 - 10)      Vital Signs Last 24 Hrs  T(C): 37.4 (23 Jul 2023 04:58), Max: 37.4 (22 Jul 2023 13:41)  T(F): 99.4 (23 Jul 2023 04:58), Max: 99.4 (22 Jul 2023 13:41)  HR: 88 (23 Jul 2023 04:58) (87 - 103)  BP: 128/88 (23 Jul 2023 04:58) (117/77 - 143/85)  BP(mean): --  RR: 17 (23 Jul 2023 04:58) (17 - 18)  SpO2: 94% (23 Jul 2023 04:58) (93% - 99%)    Parameters below as of 23 Jul 2023 04:58  Patient On (Oxygen Delivery Method): room air        Physical Exam:  General: NAD, resting comfortably in bed  Pulmonary: Nonlabored breathing, no respiratory distress  Cardiovascular: NSR  Abdominal: soft, NT/ND  Extremities: WWP, normal strength  Neuro: A/O x 3, CNs II-XII grossly intact, no focal deficits    I&O's Summary    22 Jul 2023 07:01  -  23 Jul 2023 07:00  --------------------------------------------------------  IN: 4240 mL / OUT: 450 mL / NET: 3790 mL        LABS:                        9.1    4.24  )-----------( 425      ( 23 Jul 2023 05:30 )             29.1     07-23    134<L>  |  103  |  7   ----------------------------<  112<H>  3.8   |  23  |  0.83    Ca    8.5      23 Jul 2023 05:30  Phos  2.5     07-23  Mg     1.4     07-23        Urinalysis Basic - ( 23 Jul 2023 05:30 )    Color: x / Appearance: x / SG: x / pH: x  Gluc: 112 mg/dL / Ketone: x  / Bili: x / Urobili: x   Blood: x / Protein: x / Nitrite: x   Leuk Esterase: x / RBC: x / WBC x   Sq Epi: x / Non Sq Epi: x / Bacteria: x      CAPILLARY BLOOD GLUCOSE            RADIOLOGY & ADDITIONAL STUDIES:   SUBJECTIVE:  KRISHNA o/n. Pt seen on bedside rounds this AM. Pt tolerating PO intake; denies nausea/vomiting. +f/-BM. Denies CP/SOB.     MEDICATIONS  (STANDING):  acetaminophen     Tablet .. 650 milliGRAM(s) Oral every 6 hours  dextrose 5% + sodium chloride 0.45%. 1000 milliLiter(s) (120 mL/Hr) IV Continuous <Continuous>  fenofibrate Tablet 145 milliGRAM(s) Oral daily  heparin   Injectable 5000 Unit(s) SubCutaneous every 8 hours  lidocaine   4% Patch 1 Patch Transdermal daily  pantoprazole    Tablet 40 milliGRAM(s) Oral before breakfast    MEDICATIONS  (PRN):  ondansetron Injectable 4 milliGRAM(s) IV Push every 6 hours PRN Nausea and/or Vomiting  oxyCODONE    IR 2.5 milliGRAM(s) Oral every 4 hours PRN Moderate Pain (4 - 6)  oxyCODONE    IR 5 milliGRAM(s) Oral every 4 hours PRN Severe Pain (7 - 10)      Vital Signs Last 24 Hrs  T(C): 37.4 (23 Jul 2023 04:58), Max: 37.4 (22 Jul 2023 13:41)  T(F): 99.4 (23 Jul 2023 04:58), Max: 99.4 (22 Jul 2023 13:41)  HR: 88 (23 Jul 2023 04:58) (87 - 103)  BP: 128/88 (23 Jul 2023 04:58) (117/77 - 143/85)  BP(mean): --  RR: 17 (23 Jul 2023 04:58) (17 - 18)  SpO2: 94% (23 Jul 2023 04:58) (93% - 99%)    Parameters below as of 23 Jul 2023 04:58  Patient On (Oxygen Delivery Method): room air        Physical Exam:  General: NAD, resting comfortably in bed  Pulmonary: Nonlabored breathing, no respiratory distress  Cardiovascular: NSR  Abdominal: soft, NT/ND  Extremities: WWP, normal strength  Neuro: A/O x 3, CNs II-XII grossly intact, no focal deficits    I&O's Summary    22 Jul 2023 07:01  -  23 Jul 2023 07:00  --------------------------------------------------------  IN: 4240 mL / OUT: 450 mL / NET: 3790 mL        LABS:                        9.1    4.24  )-----------( 425      ( 23 Jul 2023 05:30 )             29.1     07-23    134<L>  |  103  |  7   ----------------------------<  112<H>  3.8   |  23  |  0.83    Ca    8.5      23 Jul 2023 05:30  Phos  2.5     07-23  Mg     1.4     07-23        Urinalysis Basic - ( 23 Jul 2023 05:30 )    Color: x / Appearance: x / SG: x / pH: x  Gluc: 112 mg/dL / Ketone: x  / Bili: x / Urobili: x   Blood: x / Protein: x / Nitrite: x   Leuk Esterase: x / RBC: x / WBC x   Sq Epi: x / Non Sq Epi: x / Bacteria: x      CAPILLARY BLOOD GLUCOSE            RADIOLOGY & ADDITIONAL STUDIES:

## 2023-07-24 LAB
ANION GAP SERPL CALC-SCNC: 11 MMOL/L — SIGNIFICANT CHANGE UP (ref 5–17)
BUN SERPL-MCNC: 7 MG/DL — SIGNIFICANT CHANGE UP (ref 7–23)
CALCIUM SERPL-MCNC: 8.4 MG/DL — SIGNIFICANT CHANGE UP (ref 8.4–10.5)
CHLORIDE SERPL-SCNC: 104 MMOL/L — SIGNIFICANT CHANGE UP (ref 96–108)
CO2 SERPL-SCNC: 22 MMOL/L — SIGNIFICANT CHANGE UP (ref 22–31)
CREAT SERPL-MCNC: 0.82 MG/DL — SIGNIFICANT CHANGE UP (ref 0.5–1.3)
EGFR: 101 ML/MIN/1.73M2 — SIGNIFICANT CHANGE UP
GLUCOSE SERPL-MCNC: 109 MG/DL — HIGH (ref 70–99)
HCT VFR BLD CALC: 28.5 % — LOW (ref 39–50)
HGB BLD-MCNC: 8.8 G/DL — LOW (ref 13–17)
MAGNESIUM SERPL-MCNC: 2.2 MG/DL — SIGNIFICANT CHANGE UP (ref 1.6–2.6)
MCHC RBC-ENTMCNC: 22.6 PG — LOW (ref 27–34)
MCHC RBC-ENTMCNC: 30.9 GM/DL — LOW (ref 32–36)
MCV RBC AUTO: 73.1 FL — LOW (ref 80–100)
NRBC # BLD: 0 /100 WBCS — SIGNIFICANT CHANGE UP (ref 0–0)
PHOSPHATE SERPL-MCNC: 2.3 MG/DL — LOW (ref 2.5–4.5)
PLATELET # BLD AUTO: 422 K/UL — HIGH (ref 150–400)
POTASSIUM SERPL-MCNC: 3.6 MMOL/L — SIGNIFICANT CHANGE UP (ref 3.5–5.3)
POTASSIUM SERPL-SCNC: 3.6 MMOL/L — SIGNIFICANT CHANGE UP (ref 3.5–5.3)
RBC # BLD: 3.9 M/UL — LOW (ref 4.2–5.8)
RBC # FLD: 24.1 % — HIGH (ref 10.3–14.5)
SODIUM SERPL-SCNC: 137 MMOL/L — SIGNIFICANT CHANGE UP (ref 135–145)
WBC # BLD: 5.28 K/UL — SIGNIFICANT CHANGE UP (ref 3.8–10.5)
WBC # FLD AUTO: 5.28 K/UL — SIGNIFICANT CHANGE UP (ref 3.8–10.5)

## 2023-07-24 RX ORDER — SODIUM CHLORIDE 9 MG/ML
1000 INJECTION, SOLUTION INTRAVENOUS ONCE
Refills: 0 | Status: COMPLETED | OUTPATIENT
Start: 2023-07-24 | End: 2023-07-24

## 2023-07-24 RX ORDER — SODIUM,POTASSIUM PHOSPHATES 278-250MG
2 POWDER IN PACKET (EA) ORAL ONCE
Refills: 0 | Status: COMPLETED | OUTPATIENT
Start: 2023-07-24 | End: 2023-07-24

## 2023-07-24 RX ORDER — OXYCODONE HYDROCHLORIDE 5 MG/1
1 TABLET ORAL
Qty: 0 | Refills: 0 | DISCHARGE
Start: 2023-07-24

## 2023-07-24 RX ADMIN — HEPARIN SODIUM 5000 UNIT(S): 5000 INJECTION INTRAVENOUS; SUBCUTANEOUS at 21:28

## 2023-07-24 RX ADMIN — SODIUM CHLORIDE 1000 MILLILITER(S): 9 INJECTION, SOLUTION INTRAVENOUS at 08:19

## 2023-07-24 RX ADMIN — HEPARIN SODIUM 5000 UNIT(S): 5000 INJECTION INTRAVENOUS; SUBCUTANEOUS at 06:04

## 2023-07-24 RX ADMIN — Medication 145 MILLIGRAM(S): at 14:00

## 2023-07-24 RX ADMIN — PANTOPRAZOLE SODIUM 40 MILLIGRAM(S): 20 TABLET, DELAYED RELEASE ORAL at 06:04

## 2023-07-24 RX ADMIN — Medication 2 PACKET(S): at 08:59

## 2023-07-24 RX ADMIN — HEPARIN SODIUM 5000 UNIT(S): 5000 INJECTION INTRAVENOUS; SUBCUTANEOUS at 13:59

## 2023-07-24 NOTE — DIETITIAN INITIAL EVALUATION ADULT - ADD RECOMMEND
1. Continue with current diet order  >>Ensure Max protein oral nutrition supplement 3x/day (150 calories, 30g protein per serving) for additional nutrients and to optimize PO intakes  2. Encourage pt to meet nutritional needs as able  3. Monitor PO intakes, trend weights (weekly), monitor skin integrity, monitor labs (electrolytes, CMP), monitor GI fxn   4. Encourage adherence to diet education (reinforce as able)   5. Recommend MVI and thiamine supplementation  6. Pain and bowel regimen per team   7. Will continue to assess/honor preferences as able   8. Align nutrition interventions with GOC at all times  1. Continue with current diet order  >>Ensure Max protein oral nutrition supplement 3x/day (150 calories, 30g protein per serving) for additional nutrients and to optimize PO intakes  *IF pt's PO intakes do not improve, and since he has had <25-<50% PO intakes (majority of PO intakes clear liquids) x 5 days, recommend TPN via central line:  434g Dex, 102g AA, 50g 20% Lipids to provide in total 2384Kcal, 102g protein, GIR of 4.1, 1.4g/kg Actual Bodyweight protein  Recommend checking TG before starting TPN and then check TG weekly. Check Mg, Phos, K daily and POC BG Q6hrs. Trend daily weights. Fluids and lytes per MD discretion. Start at 150g Dex on Day 1, 250g Dex on Day 2, 350g Dex on Day 3, and advance to goal of 434g Dex on Day 4.   2. Encourage pt to meet nutritional needs as able  3. Monitor PO intakes, trend weights (weekly), monitor skin integrity, monitor labs (electrolytes, CMP), monitor GI fxn   4. Encourage adherence to diet education (reinforce as able)   5. Recommend MVI and thiamine supplementation  6. Pain and bowel regimen per team   7. Will continue to assess/honor preferences as able   8. Align nutrition interventions with GOC at all times 1. Continue with current diet order  >>Ensure Max protein oral nutrition supplement 3x/day (150 calories, 30g protein per serving) for additional nutrients and to optimize PO intakes  *IF pt's PO intakes do not improve, and since he has had <25-<50% PO intakes (majority of PO intakes clear liquids) x 5 days, recommend TPN via route per MD:  434g Dex, 102g AA, 50g 20% Lipids (pending central access) to provide in total 2384Kcal, 102g protein, GIR of 4.1, 1.4g/kg Actual Bodyweight protein  Recommend checking TG before starting TPN and then check TG weekly. Check Mg, Phos, K daily and POC BG Q6hrs. Trend daily weights. Fluids and lytes per MD discretion. Start at 150g Dex on Day 1, 250g Dex on Day 2, 350g Dex on Day 3, and advance to goal of 434g Dex on Day 4.   2. Encourage pt to meet nutritional needs as able  3. Monitor PO intakes, trend weights (weekly), monitor skin integrity, monitor labs (electrolytes, CMP), monitor GI fxn   4. Encourage adherence to diet education (reinforce as able)   5. Recommend MVI and thiamine supplementation  6. Pain and bowel regimen per team   7. Will continue to assess/honor preferences as able   8. Align nutrition interventions with GOC at all times

## 2023-07-24 NOTE — DIETITIAN INITIAL EVALUATION ADULT - PERSON TAUGHT/METHOD
Pt amenable to education; RD provided education in regards to the importance of adequate macro and micronutrients, as well as hydration to support ADLs, maintain energy levels and overall functional/nutritional status. General healthful education provided. Nutrient-dense foods promoted. Low fiber diet discussed and reintroduction of higher fiber foods discussed with pt. Pt was receptive and verbalized understanding. Pt was amenable to trying Ensure Max Protein oral nutrition supplement for additional means of nutrition./verbal instruction/patient instructed

## 2023-07-24 NOTE — DIETITIAN INITIAL EVALUATION ADULT - PERTINENT MEDS FT
MEDICATIONS  (STANDING):  acetaminophen     Tablet .. 650 milliGRAM(s) Oral every 6 hours  fenofibrate Tablet 145 milliGRAM(s) Oral daily  heparin   Injectable 5000 Unit(s) SubCutaneous every 8 hours  lidocaine   4% Patch 1 Patch Transdermal daily  pantoprazole    Tablet 40 milliGRAM(s) Oral before breakfast    MEDICATIONS  (PRN):  ondansetron Injectable 4 milliGRAM(s) IV Push every 6 hours PRN Nausea and/or Vomiting  oxyCODONE    IR 2.5 milliGRAM(s) Oral every 4 hours PRN Moderate Pain (4 - 6)  oxyCODONE    IR 5 milliGRAM(s) Oral every 4 hours PRN Severe Pain (7 - 10)

## 2023-07-24 NOTE — DIETITIAN INITIAL EVALUATION ADULT - OTHER CALCULATIONS
Based on Standards of Care pt within % ideal body weight, thus actual body weight used for all calculations. Needs adjusted based on age, malnutrition, clinical status.

## 2023-07-24 NOTE — DIETITIAN INITIAL EVALUATION ADULT - NUTRITIONGOAL OUTCOME1
Pt to consistently meet at least 75% of EEE via tolerated route that is consistent with GOC and pt will no longer exhibit s/s of malnutrition during hospital stay;

## 2023-07-24 NOTE — DIETITIAN INITIAL EVALUATION ADULT - PERTINENT LABORATORY DATA
07-24    137  |  104  |  7   ----------------------------<  109<H>  3.6   |  22  |  0.82    Ca    8.4      24 Jul 2023 07:11  Phos  2.3     07-24  Mg     2.2     07-24

## 2023-07-24 NOTE — DIETITIAN INITIAL EVALUATION ADULT - SIGNS/SYMPTOMS
as evidenced by moderate-severe muscle and fat loss, 19.5% wt loss x 3 months, </=75% PO x 1+ months

## 2023-07-24 NOTE — DIETITIAN NUTRITION RISK NOTIFICATION - ADDITIONAL COMMENTS/DIETITIAN RECOMMENDATIONS
Malnutrition of severe degree in the context of chronic illness/injury  related to inadequate PO intakes secondary to pt's condition  as evidenced by moderate-severe muscle and fat loss, 19.5% wt loss x 3 months, </=75% PO x 1+ months    1. Continue with current diet order (low fiber, kosher diet)  >>Ensure Max protein oral nutrition supplement 3x/day (150 calories, 30g protein per serving) for additional nutrients and to optimize PO intakes  2. Encourage pt to meet nutritional needs as able  3. Monitor PO intakes, trend weights (weekly), monitor skin integrity, monitor labs (electrolytes, CMP), monitor GI fxn   4. Encourage adherence to diet education (reinforce as able)   5. Recommend MVI and thiamine supplementation  6. Pain and bowel regimen per team   7. Will continue to assess/honor preferences as able   8. Align nutrition interventions with GOC at all times  Malnutrition of severe degree in the context of chronic illness/injury  related to inadequate PO intakes secondary to pt's condition  as evidenced by moderate-severe muscle and fat loss, 19.5% wt loss x 3 months, </=75% PO x 1+ months    1. Continue with current diet order (low fiber, kosher diet)  >>Ensure Max protein oral nutrition supplement 3x/day (150 calories, 30g protein per serving) for additional nutrients and to optimize PO intakes  *IF pt's PO intakes do not improve, and since he has had <25-<50% PO intakes (majority of PO intakes clear liquids) x 5 days, recommend TPN via route per MD:  434g Dex, 102g AA, 50g 20% Lipids (pending central access) to provide in total 2384Kcal, 102g protein, GIR of 4.1, 1.4g/kg Actual Bodyweight protein  Recommend checking TG before starting TPN and then check TG weekly. Check Mg, Phos, K daily and POC BG Q6hrs. Trend daily weights. Fluids and lytes per MD discretion. Start at 150g Dex on Day 1, 250g Dex on Day 2, 350g Dex on Day 3, and advance to goal of 434g Dex on Day 4.   2. Encourage pt to meet nutritional needs as able  3. Monitor PO intakes, trend weights (weekly), monitor skin integrity, monitor labs (electrolytes, CMP), monitor GI fxn   4. Encourage adherence to diet education (reinforce as able)   5. Recommend MVI and thiamine supplementation  6. Pain and bowel regimen per team   7. Will continue to assess/honor preferences as able   8. Align nutrition interventions with GOC at all times

## 2023-07-24 NOTE — DIETITIAN INITIAL EVALUATION ADULT - OTHER INFO
57 year old male with PMHx of extensive colonic diverticulitis, HTN, HLD, OJSE and nephrolithiasis PSHx hernia repair, total abdominal colectomy with ileoproctostomy (Dr. Villegas 04/25/23) c/b small leak RTOR for open creation of DLI (5/2/23). Now s/p ileostomy reversal (7/18).     Pt seen in room for nutrition assessment. Pt reports fair, sometimes poor, appetite PTA and during hospital stay. As per diet recall PTA: pt stated he was eating toast with peanut butter, cream cheese, pasta, rice, tuna, chicken, apples and applesauce, however pt was eating small portions, ~50% of his usual PO intakes. Currently on Low fiber + Kosher diet, was previously NPO and Clear Liquids diet x the past 5 days, except for 5h when pt was low fiber diet on 7/20/23; pt has been tolerating liquids and solids poorly, noted with <25-<50% PO intakes overall. Kosher food preferences noted. No known food allergies. Pt stated his usual body weight was ~203#, per electronic medical records data pt weighed ~200.64# at the end of April 2023. Dosing wt: ~161.5#, Ideal body weight: 196#, pt is 82% of ideal body weight. Pt noted with 19.5% wt loss x <3 months, this is clinically significant. Pt endorsed abdominal pain, some nausea last week, no nausea or vomiting currently, noted with liquid stools, green stools last night, last BM on 7/24/23; additionally, abdominal discomfort and cramping noted. No edema. Skin: abdominal surgical incisions. Nabor: 19. No issues chewing or swallowing noted. Mild (level 3) pain noted. Labs reviewed: elevated serum Glucose (109), low Phos (2.3); RD to continue to monitor trends. Nutritionally pertinent medications/supplements: IV fluids, zofran, protonix. Per nutrition focused physical exam, severe clavicle wasting, moderate temporal wasting, moderate subcutaneous fat loss to orbital region. Based on ASPEN guidelines, pt does meet criteria for severe malnutrition at this time. Pt amenable to education; RD provided education in regards to the importance of adequate macro and micronutrients, as well as hydration to support ADLs, maintain energy levels and overall functional/nutritional status. General healthful education provided. Nutrient-dense foods promoted. Low fiber diet discussed and reintroduction of higher fiber foods discussed with pt. Pt was receptive and verbalized understanding. Pt was amenable to trying Ensure Max Protein oral nutrition supplement for additional means of nutrition. No additional nutrition-related concerns. Will continue to follow per RD protocol. Additional nutrition recommendations below to follow.

## 2023-07-25 ENCOUNTER — TRANSCRIPTION ENCOUNTER (OUTPATIENT)
Age: 58
End: 2023-07-25

## 2023-07-25 VITALS
SYSTOLIC BLOOD PRESSURE: 115 MMHG | OXYGEN SATURATION: 94 % | HEART RATE: 84 BPM | RESPIRATION RATE: 17 BRPM | DIASTOLIC BLOOD PRESSURE: 80 MMHG | TEMPERATURE: 98 F

## 2023-07-25 LAB
ANION GAP SERPL CALC-SCNC: 11 MMOL/L — SIGNIFICANT CHANGE UP (ref 5–17)
BUN SERPL-MCNC: 11 MG/DL — SIGNIFICANT CHANGE UP (ref 7–23)
CALCIUM SERPL-MCNC: 8.7 MG/DL — SIGNIFICANT CHANGE UP (ref 8.4–10.5)
CHLORIDE SERPL-SCNC: 103 MMOL/L — SIGNIFICANT CHANGE UP (ref 96–108)
CO2 SERPL-SCNC: 25 MMOL/L — SIGNIFICANT CHANGE UP (ref 22–31)
CREAT SERPL-MCNC: 0.89 MG/DL — SIGNIFICANT CHANGE UP (ref 0.5–1.3)
EGFR: 99 ML/MIN/1.73M2 — SIGNIFICANT CHANGE UP
GLUCOSE SERPL-MCNC: 93 MG/DL — SIGNIFICANT CHANGE UP (ref 70–99)
HCT VFR BLD CALC: 29.8 % — LOW (ref 39–50)
HGB BLD-MCNC: 9.2 G/DL — LOW (ref 13–17)
MAGNESIUM SERPL-MCNC: 2 MG/DL — SIGNIFICANT CHANGE UP (ref 1.6–2.6)
MCHC RBC-ENTMCNC: 22.5 PG — LOW (ref 27–34)
MCHC RBC-ENTMCNC: 30.9 GM/DL — LOW (ref 32–36)
MCV RBC AUTO: 72.9 FL — LOW (ref 80–100)
NRBC # BLD: 0 /100 WBCS — SIGNIFICANT CHANGE UP (ref 0–0)
PHOSPHATE SERPL-MCNC: 3.6 MG/DL — SIGNIFICANT CHANGE UP (ref 2.5–4.5)
PLATELET # BLD AUTO: 472 K/UL — HIGH (ref 150–400)
POTASSIUM SERPL-MCNC: 3.3 MMOL/L — LOW (ref 3.5–5.3)
POTASSIUM SERPL-SCNC: 3.3 MMOL/L — LOW (ref 3.5–5.3)
RBC # BLD: 4.09 M/UL — LOW (ref 4.2–5.8)
RBC # FLD: 24.5 % — HIGH (ref 10.3–14.5)
SODIUM SERPL-SCNC: 139 MMOL/L — SIGNIFICANT CHANGE UP (ref 135–145)
WBC # BLD: 6.98 K/UL — SIGNIFICANT CHANGE UP (ref 3.8–10.5)
WBC # FLD AUTO: 6.98 K/UL — SIGNIFICANT CHANGE UP (ref 3.8–10.5)

## 2023-07-25 PROCEDURE — 97161 PT EVAL LOW COMPLEX 20 MIN: CPT

## 2023-07-25 PROCEDURE — 99221 1ST HOSP IP/OBS SF/LOW 40: CPT

## 2023-07-25 PROCEDURE — 74018 RADEX ABDOMEN 1 VIEW: CPT

## 2023-07-25 PROCEDURE — 83735 ASSAY OF MAGNESIUM: CPT

## 2023-07-25 PROCEDURE — 85027 COMPLETE CBC AUTOMATED: CPT

## 2023-07-25 PROCEDURE — 97116 GAIT TRAINING THERAPY: CPT

## 2023-07-25 PROCEDURE — 84100 ASSAY OF PHOSPHORUS: CPT

## 2023-07-25 PROCEDURE — C1889: CPT

## 2023-07-25 PROCEDURE — 36415 COLL VENOUS BLD VENIPUNCTURE: CPT

## 2023-07-25 PROCEDURE — 80048 BASIC METABOLIC PNL TOTAL CA: CPT

## 2023-07-25 PROCEDURE — 88304 TISSUE EXAM BY PATHOLOGIST: CPT

## 2023-07-25 RX ORDER — TRIAMTERENE/HYDROCHLOROTHIAZID 75 MG-50MG
1 TABLET ORAL
Refills: 0 | DISCHARGE

## 2023-07-25 RX ORDER — OXYCODONE HYDROCHLORIDE 5 MG/1
1 TABLET ORAL
Qty: 12 | Refills: 0
Start: 2023-07-25 | End: 2023-07-27

## 2023-07-25 RX ORDER — DILTIAZEM HCL 120 MG
1 CAPSULE, EXT RELEASE 24 HR ORAL
Refills: 0 | DISCHARGE

## 2023-07-25 RX ORDER — POTASSIUM CHLORIDE 20 MEQ
40 PACKET (EA) ORAL ONCE
Refills: 0 | Status: COMPLETED | OUTPATIENT
Start: 2023-07-25 | End: 2023-07-25

## 2023-07-25 RX ADMIN — HEPARIN SODIUM 5000 UNIT(S): 5000 INJECTION INTRAVENOUS; SUBCUTANEOUS at 06:02

## 2023-07-25 RX ADMIN — Medication 40 MILLIEQUIVALENT(S): at 09:54

## 2023-07-25 RX ADMIN — Medication 145 MILLIGRAM(S): at 11:12

## 2023-07-25 RX ADMIN — PANTOPRAZOLE SODIUM 40 MILLIGRAM(S): 20 TABLET, DELAYED RELEASE ORAL at 07:04

## 2023-07-25 NOTE — DISCHARGE NOTE NURSING/CASE MANAGEMENT/SOCIAL WORK - NSDCPEFALRISK_GEN_ALL_CORE
For information on Fall & Injury Prevention, visit: [email or web address] OR  [email or web address] OR  https://www.cdc.gov/steadi/patient.html

## 2023-07-25 NOTE — PROGRESS NOTE ADULT - NUTRITIONAL ASSESSMENT
This patient has been assessed with a concern for Malnutrition and has been determined to have a diagnosis/diagnoses of Severe protein-calorie malnutrition.    This patient is being managed with:   Diet Low Fiber-  Kosher  Supplement Feeding Modality:  Oral  Ensure Max Cans or Servings Per Day:  1       Frequency:  Three Times a day  Entered: Jul 24 2023 11:04AM    Diet Low Fiber-  Kosher  Entered: Jul 23 2023 11:08PM    The following pending diet order is being considered for treatment of Severe protein-calorie malnutrition:null

## 2023-07-25 NOTE — DISCHARGE NOTE NURSING/CASE MANAGEMENT/SOCIAL WORK - PATIENT PORTAL LINK FT
You can access the FollowMyHealth Patient Portal offered by Health system by registering at the following website: [email or web address]. By joining [facility]’s FollowMyHealth portal, you will also be able to view your health information using other applications (apps) compatible with our system.

## 2023-07-25 NOTE — PROGRESS NOTE ADULT - SUBJECTIVE AND OBJECTIVE BOX
INTERVAL HPI/OVERNIGHT EVENTS: slava diet, missed voids    STATUS POST:  7/18: Ileostomy reversal    POST OPERATIVE DAY #: 7    SUBJECTIVE: Pt seen and examined at bedside this am by surgery team. No acute complaints. Tolerating diet, pain well controlled. Denies f/n/v/cp/sob.    MEDICATIONS  (STANDING):  acetaminophen     Tablet .. 650 milliGRAM(s) Oral every 6 hours  fenofibrate Tablet 145 milliGRAM(s) Oral daily  heparin   Injectable 5000 Unit(s) SubCutaneous every 8 hours  lidocaine   4% Patch 1 Patch Transdermal daily  pantoprazole    Tablet 40 milliGRAM(s) Oral before breakfast    MEDICATIONS  (PRN):  ondansetron Injectable 4 milliGRAM(s) IV Push every 6 hours PRN Nausea and/or Vomiting  oxyCODONE    IR 2.5 milliGRAM(s) Oral every 4 hours PRN Moderate Pain (4 - 6)  oxyCODONE    IR 5 milliGRAM(s) Oral every 4 hours PRN Severe Pain (7 - 10)    Vital Signs Last 24 Hrs  T(C): 36.4 (25 Jul 2023 04:40), Max: 37 (24 Jul 2023 08:20)  T(F): 97.6 (25 Jul 2023 04:40), Max: 98.6 (24 Jul 2023 08:20)  HR: 73 (25 Jul 2023 04:40) (73 - 87)  BP: 123/84 (25 Jul 2023 04:40) (123/84 - 131/90)  BP(mean): --  RR: 17 (25 Jul 2023 04:40) (16 - 17)  SpO2: 94% (25 Jul 2023 04:40) (94% - 96%)    Parameters below as of 25 Jul 2023 04:40  Patient On (Oxygen Delivery Method): room air    PHYSICAL EXAM:    Constitutional: A&Ox3, NAD    Respiratory: non labored breathing, no respiratory distress    Cardiovascular: NSR, RRR    Gastrointestinal: abdomen soft, nd, nt. ileostomy reversal packing changed to dry dressing. No rebound or guarding.    Extremities: wwp, no calf tenderness or edema. SCDs in place     I&O's Detail    24 Jul 2023 07:01  -  25 Jul 2023 07:00  --------------------------------------------------------  IN:    Lactated Ringers Bolus: 1000 mL    Oral Fluid: 480 mL  Total IN: 1480 mL    OUT:    Voided (mL): 2 mL  Total OUT: 2 mL    Total NET: 1478 mL          LABS:                        9.2    6.98  )-----------( 472      ( 25 Jul 2023 05:30 )             29.8     07-25    139  |  103  |  11  ----------------------------<  93  3.3<L>   |  25  |  0.89    Ca    8.7      25 Jul 2023 05:30  Phos  3.6     07-25  Mg     2.0     07-25        Urinalysis Basic - ( 25 Jul 2023 05:30 )    Color: x / Appearance: x / SG: x / pH: x  Gluc: 93 mg/dL / Ketone: x  / Bili: x / Urobili: x   Blood: x / Protein: x / Nitrite: x   Leuk Esterase: x / RBC: x / WBC x   Sq Epi: x / Non Sq Epi: x / Bacteria: x        RADIOLOGY & ADDITIONAL STUDIES:

## 2023-07-25 NOTE — PROGRESS NOTE ADULT - ASSESSMENT
57 year old male with PMHx of extensive colonic diverticulitis, HTN, HLD, JOSE and nephrolithiasis PSHx hernia repair, total abdominal colectomy with ileoproctostomy (Dr. Villegas 04/25/23) c/b small leak RTOR for open creation of DLI (5/2/23). Now s/p ileostomy reversal (7/18).     LRD (kosher) w/ ensures   Pain/nausea control PRN  Home protonix and fenofibrate  HSQ/SCDs  IS/OOBA  Dispo: PT no needs, d/c home today w/ daily dry packing changes
57 year old male with PMHx of extensive colonic diverticulitis, HTN, HLD, JOSE and nephrolithiasis PSHx hernia repair, total abdominal colectomy with ileoproctostomy (Dr. Villegas 04/25/23) c/b small leak RTOR for open creation of DLI (5/2/23). Now s/p ileostomy reversal (7/18).    LFD  Pain/nausea control PRN  HSQ/SCDs  IS/OOBA  Mcmanus removed, TOV pending   Continue daily WTD packing changes   Dispo: no PT needs, likely d/c 7/20 w/ home care
57 year old male with PMHx of extensive colonic diverticulitis, HTN, HLD, JOSE and nephrolithiasis PSHx hernia repair, total abdominal colectomy with ileoproctostomy (Dr. Villegas 04/25/23) c/b small leak RTOR for open creation of DLI (5/2/23). Now s/p ileostomy reversal (7/18).     NPO/ IVF  KUB pending  Pain/nausea control PRN  Dry dressing change daily  HSQ/SCDs  IS/OOBA  Dispo: PT no needs
57 year old male with PMHx of extensive colonic diverticulitis, HTN, HLD, JOSE and nephrolithiasis PSHx hernia repair, total abdominal colectomy with ileoproctostomy (Dr. Villegas 04/25/23) c/b small leak RTOR for open creation of DLI (5/2/23). Now s/p ileostomy reversal (7/18). OOBA encourage    CLD/IVF  Pain/nausea control PRN  Mcmanus  Dressing change on POD2  HSQ/SCDs  IS/OOB
57 year old male with PMHx of extensive colonic diverticulitis, HTN, HLD, JOSE and nephrolithiasis PSHx hernia repair, total abdominal colectomy with ileoproctostomy (Dr. Villegas 04/25/23) c/b small leak RTOR for open creation of DLI (5/2/23). Now s/p ileostomy reversal (7/18).     LRD/IVF  Pain/nausea control PRN  Home protonix and fenofibrate  HSQ/SCDs  IS/OOBA  Dispo: PT no needs
57 year old male with PMHx of extensive colonic diverticulitis, HTN, HLD, JOSE and nephrolithiasis PSHx hernia repair, total abdominal colectomy with ileoproctostomy (Dr. Villegas 04/25/23) c/b small leak RTOR for open creation of DLI (5/2/23). Now s/p ileostomy reversal (7/18).     NPO/ IVF  Pain/nausea control PRN  HSQ/SCDs  IS/OOBA  Dispo: PT no needs
57 year old male with PMHx of extensive colonic diverticulitis, HTN, HLD, JOSE and nephrolithiasis PSHx hernia repair, total abdominal colectomy with ileoproctostomy (Dr. Villegas 04/25/23) c/b small leak RTOR for open creation of DLI (5/2/23). Now s/p ileostomy reversal (7/18). Pt tolerating PO intake and would like to eat; advancing to CLD.     CLD/IVF  Pain/nausea control PRN  HSQ/SCDs  IS/OOBA  Dispo: PT no needs

## 2023-07-26 LAB — SURGICAL PATHOLOGY STUDY: SIGNIFICANT CHANGE UP

## 2023-07-27 ENCOUNTER — TRANSCRIPTION ENCOUNTER (OUTPATIENT)
Age: 58
End: 2023-07-27

## 2023-08-01 DIAGNOSIS — K91.89 OTHER POSTPROCEDURAL COMPLICATIONS AND DISORDERS OF DIGESTIVE SYSTEM: ICD-10-CM

## 2023-08-01 DIAGNOSIS — K56.7 ILEUS, UNSPECIFIED: ICD-10-CM

## 2023-08-01 DIAGNOSIS — E78.5 HYPERLIPIDEMIA, UNSPECIFIED: ICD-10-CM

## 2023-08-01 DIAGNOSIS — I10 ESSENTIAL (PRIMARY) HYPERTENSION: ICD-10-CM

## 2023-08-01 DIAGNOSIS — K21.9 GASTRO-ESOPHAGEAL REFLUX DISEASE WITHOUT ESOPHAGITIS: ICD-10-CM

## 2023-08-01 DIAGNOSIS — K57.30 DIVERTICULOSIS OF LARGE INTESTINE WITHOUT PERFORATION OR ABSCESS WITHOUT BLEEDING: ICD-10-CM

## 2023-08-01 DIAGNOSIS — G47.33 OBSTRUCTIVE SLEEP APNEA (ADULT) (PEDIATRIC): ICD-10-CM

## 2023-08-01 DIAGNOSIS — Z43.2 ENCOUNTER FOR ATTENTION TO ILEOSTOMY: ICD-10-CM

## 2023-08-01 DIAGNOSIS — E43 UNSPECIFIED SEVERE PROTEIN-CALORIE MALNUTRITION: ICD-10-CM

## 2023-08-07 ENCOUNTER — APPOINTMENT (OUTPATIENT)
Dept: COLORECTAL SURGERY | Facility: CLINIC | Age: 58
End: 2023-08-07
Payer: COMMERCIAL

## 2023-08-07 VITALS
BODY MASS INDEX: 21.05 KG/M2 | WEIGHT: 164 LBS | TEMPERATURE: 98 F | DIASTOLIC BLOOD PRESSURE: 84 MMHG | SYSTOLIC BLOOD PRESSURE: 120 MMHG | HEIGHT: 74 IN | HEART RATE: 78 BPM

## 2023-08-07 PROBLEM — G47.33 OBSTRUCTIVE SLEEP APNEA (ADULT) (PEDIATRIC): Chronic | Status: ACTIVE | Noted: 2023-04-24

## 2023-08-07 PROBLEM — K57.92 DIVERTICULITIS OF INTESTINE, PART UNSPECIFIED, WITHOUT PERFORATION OR ABSCESS WITHOUT BLEEDING: Chronic | Status: ACTIVE | Noted: 2023-04-24

## 2023-08-07 PROCEDURE — 99024 POSTOP FOLLOW-UP VISIT: CPT

## 2023-08-07 NOTE — HISTORY OF PRESENT ILLNESS
[FreeTextEntry1] : 59 y/o M with history of pancolonic diverticulosis, s/p laparoscopic converted to open total colectomy with ileorectal anastomosis on 4/25/23, c/b anastomotic leak requiring RTOR for exploratory laparotomy with open creation of diverting loop ileostomy 5/2/23, presents in post operative evaluation of ileostomy reversal on 7/18, POD 4 patient developed ileus, confirmed by abdominal xray, subsequently resolved and pt dc'd home.    Collected Date/Time: 7/18/2023 18:17 EDT Received Date/Time:7/19/2023 08:18 EDT  Surgical Pathology Report - Auth (Verified)  Specimen(s) Submitted 1  Ileostomy  Final Diagnosis 1. Ileostomy: - Ileostomy stoma with fibrosis and serosal fibrous adhesions  Verified by: Peggy Mortensen MD (Electronic Signature) Reported on: 07/26/23 14:06 EDT, [facility], [facility] [address] [place] [address] Phone: [phone]   Fax: [phone] _________________________________________________________________  Clinical Information Diverticulitis   Gross Description Received:  In formalin labeled  "ileostomy" , consisting of a segment of small bowel with a stoma, surrounded by skin Small bowel:  7.0 cm length    3.5 cm circumference Serosa:  Pink-purple, smooth, focally hemorrhagic Mucosa:  Tan, grossly unremarkable; granular along the stoma Skin:  3.7 x 3.0 cm in greatest dimension ,  tan-gray, pale Submitted:  Representative sections in 2 cassettes: 1A: Shaved stapled margin 1B: Stoma with skin LUZ Paulson(ASCP) 07/25/2023 09:26 AM  Reports improved appetite.  No abdominal pain.  Occasional anal irritation with frequent bowel movements.  Bowel movements 3 times in the morning and 3 times in the evening.  Loose but occasionally formed small pieces.

## 2023-08-07 NOTE — ASSESSMENT
[FreeTextEntry1] : Recovering well from surgery.  Typical post colectomy bowel function.  Recommend fiber and bulking agents with Metamucil and applesauce daily.  Trial of Imodium as needed.  Continue local wound care.

## 2023-08-07 NOTE — PHYSICAL EXAM
[Abdomen Masses] : No abdominal masses [Abdomen Tenderness] : ~T No ~M abdominal tenderness [No HSM] : no hepatosplenomegaly [JVD] : no jugular venous distention  [Normal Breath Sounds] : Normal breath sounds [Normal Heart Sounds] : normal heart sounds [No Rash or Lesion] : No rash or lesion [Alert] : alert [Calm] : calm [de-identified] : Abdomen is soft, nontender, nondistended.  Ileostomy site clean.  Granulating.

## 2023-10-06 ENCOUNTER — NON-APPOINTMENT (OUTPATIENT)
Age: 58
End: 2023-10-06

## 2023-10-06 DIAGNOSIS — R10.2 PELVIC AND PERINEAL PAIN: ICD-10-CM

## 2023-10-16 ENCOUNTER — APPOINTMENT (OUTPATIENT)
Dept: CT IMAGING | Facility: CLINIC | Age: 58
End: 2023-10-16
Payer: COMMERCIAL

## 2023-10-16 ENCOUNTER — OUTPATIENT (OUTPATIENT)
Dept: OUTPATIENT SERVICES | Facility: HOSPITAL | Age: 58
LOS: 1 days | End: 2023-10-16

## 2023-10-16 DIAGNOSIS — Z90.49 ACQUIRED ABSENCE OF OTHER SPECIFIED PARTS OF DIGESTIVE TRACT: Chronic | ICD-10-CM

## 2023-10-16 DIAGNOSIS — Z98.890 OTHER SPECIFIED POSTPROCEDURAL STATES: Chronic | ICD-10-CM

## 2023-10-16 DIAGNOSIS — Z93.3 COLOSTOMY STATUS: Chronic | ICD-10-CM

## 2023-10-16 PROCEDURE — 72193 CT PELVIS W/DYE: CPT | Mod: 26

## 2023-10-23 ENCOUNTER — APPOINTMENT (OUTPATIENT)
Dept: COLORECTAL SURGERY | Facility: CLINIC | Age: 58
End: 2023-10-23
Payer: COMMERCIAL

## 2023-10-23 VITALS
TEMPERATURE: 98.1 F | HEIGHT: 74 IN | DIASTOLIC BLOOD PRESSURE: 87 MMHG | BODY MASS INDEX: 22.97 KG/M2 | WEIGHT: 179 LBS | SYSTOLIC BLOOD PRESSURE: 133 MMHG | HEART RATE: 93 BPM

## 2023-10-23 DIAGNOSIS — K57.30 DIVERTICULOSIS OF LARGE INTESTINE W/OUT PERFORATION OR ABSCESS W/OUT BLEEDING: ICD-10-CM

## 2023-10-23 PROCEDURE — 99214 OFFICE O/P EST MOD 30 MIN: CPT

## 2023-11-10 ENCOUNTER — APPOINTMENT (OUTPATIENT)
Dept: SURGERY | Facility: CLINIC | Age: 58
End: 2023-11-10
Payer: COMMERCIAL

## 2023-11-10 VITALS
OXYGEN SATURATION: 98 % | DIASTOLIC BLOOD PRESSURE: 89 MMHG | TEMPERATURE: 98.6 F | BODY MASS INDEX: 23.1 KG/M2 | HEIGHT: 74 IN | HEART RATE: 75 BPM | WEIGHT: 180 LBS | SYSTOLIC BLOOD PRESSURE: 138 MMHG

## 2023-11-10 PROCEDURE — 99204 OFFICE O/P NEW MOD 45 MIN: CPT

## 2023-12-13 NOTE — DATA REVIEWED
[FreeTextEntry1] : CT scan of pelvis with IV contrast: Discussed and reviewed imaging with patient.  IMPRESSION: Fat-containing right inguinal hernia with possible small hydrocele of the spermatic cord. Suggestion of bilateral complicated hydroceles; sonographic correlation is recommended. Negative for abdominal wall hernia, including at site of right ileostomy closure.

## 2023-12-13 NOTE — HISTORY OF PRESENT ILLNESS
[de-identified] : This is a 59 y/o male presenting to the office for evaluation and management of a Right inguinal hernia. Pmhx of diverticulosis s/p laparoscopic converted to open colectomy with ileorectal anastomosis (4/25/23) c/b anastomotic leak requiring RTOR for exploratory lap with open creation of diverting loop ileostomy (5/2/23), s/p ileostomy closure (7/18/23), hx of left inguinal hernia repair. Here today for evaluation of Right groin bulge. He reports he first noted the bulge x 3 months ago. He reports the bulge increases in size when straining to have a bowel movement. He denies significant pain, however, does have discomfort especially with prolonged standing. He underwent a CT scan of the pelvis which revealed a fat-containing Right inguinal herina. He denies any urinary or obstructive issues.

## 2023-12-13 NOTE — PHYSICAL EXAM
[Abdominal Masses] : No abdominal masses [Abdomen Tenderness] : ~T ~M No abdominal tenderness [Tender] : was nontender [Enlarged] : not enlarged [de-identified] : NAD, comfortable [de-identified] : Normocephalic, atraumatic. No scleral icterus.  [de-identified] : Supple, no JVD or cervical lymphadenopathy.  [de-identified] : No respiratory distress  [de-identified] : +BS soft, nontender, nondistended. Well healed prior incisions. There is a Right inguinal hernia, reducible, nontender.

## 2023-12-13 NOTE — ASSESSMENT
[FreeTextEntry1] : Pleasant 59 y/o male with hx of multiple abdominal surgeries now with Right inguinal hernia, symptomatic. Wishes to have this repaired.   We discussed the risk,benefits and alternatives to OPEN Right inguinal herina repair with possible mesh in detial including but not limited to bleeding, infection, death, disability, blood clots, cardiac, pulmonary, neurological problems, prolonged hospital course, need for additional procedures, need for implants, recurrence of the hernia, displeasure with cosmetic outcome and other issues. Patient expressed understanding and wishes to proceed with surgery. All questions were answered.   Plan: - Open Right inguinal hernia repair with possible mesh

## 2023-12-15 RX ORDER — FAMOTIDINE 10 MG/ML
1 INJECTION INTRAVENOUS
Refills: 0 | DISCHARGE

## 2023-12-15 NOTE — ASU PATIENT PROFILE, ADULT - NSICDXPASTMEDICALHX_GEN_ALL_CORE_FT
PAST MEDICAL HISTORY:  Diverticulitis diverticulosis    HLD (hyperlipidemia)     HTN (hypertension)     JOSE (obstructive sleep apnea) not using machine

## 2023-12-15 NOTE — ASU PATIENT PROFILE, ADULT - FALL HARM RISK - UNIVERSAL INTERVENTIONS
Bed in lowest position, wheels locked, appropriate side rails in place/Call bell, personal items and telephone in reach/Instruct patient to call for assistance before getting out of bed or chair/Non-slip footwear when patient is out of bed/[place] to call system/Physically safe environment - no spills, clutter or unnecessary equipment/Purposeful Proactive Rounding/Room/bathroom lighting operational, light cord in reach Bed in lowest position, wheels locked, appropriate side rails in place/Call bell, personal items and telephone in reach/Instruct patient to call for assistance before getting out of bed or chair/Non-slip footwear when patient is out of bed/[place] to call system/Physically safe environment - no spills, clutter or unnecessary equipment/Purposeful Proactive Rounding/Room/bathroom lighting operational, light cord in reach

## 2023-12-15 NOTE — ASU PATIENT PROFILE, ADULT - NSICDXPASTSURGICALHX_GEN_ALL_CORE_FT
PAST SURGICAL HISTORY:  Colostomy in place     H/O hernia repair groin left    H/O left hemicolectomy 2021    History of colon surgery 4/25/23     PAST SURGICAL HISTORY:  Colostomy in place may 2023 removed    H/O hernia repair groin left    H/O left hemicolectomy 2021    History of colon surgery 4/25/23

## 2023-12-17 ENCOUNTER — TRANSCRIPTION ENCOUNTER (OUTPATIENT)
Age: 58
End: 2023-12-17

## 2023-12-18 ENCOUNTER — APPOINTMENT (OUTPATIENT)
Dept: SURGERY | Facility: HOSPITAL | Age: 58
End: 2023-12-18

## 2023-12-18 ENCOUNTER — TRANSCRIPTION ENCOUNTER (OUTPATIENT)
Age: 58
End: 2023-12-18

## 2023-12-18 ENCOUNTER — OUTPATIENT (OUTPATIENT)
Dept: OUTPATIENT SERVICES | Facility: HOSPITAL | Age: 58
LOS: 1 days | Discharge: HOME | End: 2023-12-18
Payer: COMMERCIAL

## 2023-12-18 VITALS
RESPIRATION RATE: 16 BRPM | DIASTOLIC BLOOD PRESSURE: 83 MMHG | HEIGHT: 75 IN | SYSTOLIC BLOOD PRESSURE: 134 MMHG | WEIGHT: 185.19 LBS | HEART RATE: 74 BPM | OXYGEN SATURATION: 100 % | TEMPERATURE: 97 F

## 2023-12-18 VITALS
DIASTOLIC BLOOD PRESSURE: 82 MMHG | SYSTOLIC BLOOD PRESSURE: 127 MMHG | HEART RATE: 65 BPM | RESPIRATION RATE: 26 BRPM | OXYGEN SATURATION: 100 %

## 2023-12-18 DIAGNOSIS — Z90.49 ACQUIRED ABSENCE OF OTHER SPECIFIED PARTS OF DIGESTIVE TRACT: Chronic | ICD-10-CM

## 2023-12-18 DIAGNOSIS — Z98.890 OTHER SPECIFIED POSTPROCEDURAL STATES: Chronic | ICD-10-CM

## 2023-12-18 DIAGNOSIS — Z93.3 COLOSTOMY STATUS: Chronic | ICD-10-CM

## 2023-12-18 PROCEDURE — C1781: CPT

## 2023-12-18 PROCEDURE — S2900 ROBOTIC SURGICAL SYSTEM: CPT | Mod: GC

## 2023-12-18 PROCEDURE — 49505 PRP I/HERN INIT REDUC >5 YR: CPT | Mod: GC

## 2023-12-18 PROCEDURE — 49507 PRP I/HERN INIT BLOCK >5 YR: CPT | Mod: RT

## 2023-12-18 DEVICE — PLUG AND PATCH PHASIX 1.6X1.3IN MED: Type: IMPLANTABLE DEVICE | Status: FUNCTIONAL

## 2023-12-18 RX ORDER — TRIAMTERENE/HYDROCHLOROTHIAZID 75 MG-50MG
1 TABLET ORAL
Refills: 0 | DISCHARGE

## 2023-12-18 RX ORDER — ONDANSETRON 8 MG/1
4 TABLET, FILM COATED ORAL EVERY 6 HOURS
Refills: 0 | Status: DISCONTINUED | OUTPATIENT
Start: 2023-12-18 | End: 2023-12-18

## 2023-12-18 RX ORDER — OMEPRAZOLE 10 MG/1
1 CAPSULE, DELAYED RELEASE ORAL
Refills: 0 | DISCHARGE

## 2023-12-18 RX ORDER — DILTIAZEM HCL 120 MG
1 CAPSULE, EXT RELEASE 24 HR ORAL
Refills: 0 | DISCHARGE

## 2023-12-18 RX ORDER — FENOFIBRATE,MICRONIZED 130 MG
1 CAPSULE ORAL
Refills: 0 | DISCHARGE

## 2023-12-18 NOTE — BRIEF OPERATIVE NOTE - OPERATION/FINDINGS
Procedure: Open Right Inguinal Hernia Repair    Open Repair of right Inguinal Hernia with Bard Plug and Patch. Hemostasis ensured. Incision closed in layers w. Vicryl suture

## 2023-12-18 NOTE — ASU DISCHARGE PLAN (ADULT/PEDIATRIC) - CARE PROVIDER_API CALL
Ritchie Ding  Surgery  [address]  [place] [address]  Phone: [phone]  Fax: [phone]  Follow Up Time:    Ritchie Ding  Surgery  [address], [address]  [place] [address]  Phone: [phone]  Fax: [phone]  Follow Up Time:

## 2023-12-18 NOTE — BRIEF OPERATIVE NOTE - NSICDXBRIEFPROCEDURE_GEN_ALL_CORE_FT
PROCEDURES:  Open repair of inguinal hernia using mesh in adult 18-Dec-2023 17:31:58  Andi Greenfield

## 2023-12-18 NOTE — ASU DISCHARGE PLAN (ADULT/PEDIATRIC) - ASU DC SPECIAL INSTRUCTIONSFT
Follow up with  in 1-2 weeks. Call the office 7 to schedule your appointment. Instructions for follow-up, activity and diet. Please resume all regular home medications unless specifically advised not to take a particular medication. Also, please take any new medications as prescribed.    Please get plenty of rest, continue to ambulate several times per day, and drink adequate amounts of fluids. Avoid lifting weights greater than 5-10 lbs until you follow-up with your surgeon, who will instruct you further regarding activity restrictions. Avoid driving or operating heavy machinery while taking pain medications. You may shower letting soap and water run over incisions. Pat dry with clean towel when finished.    Call the office if you experience increasing abdominal pain, nausea, vomiting, or temperature >101 F.

## 2023-12-18 NOTE — ASU DISCHARGE PLAN (ADULT/PEDIATRIC) - NS MD DC FALL RISK RISK
For information on Fall & Injury Prevention, visit: [email or web address] OR  [email or web address] OR  https://www.cdc.gov/steadi/patient.html For information on Fall & Injury Prevention, visit: [email or web address] OR  [email or web address] OR  https://www.cdc.gov/steadi/patient.html

## 2023-12-19 ENCOUNTER — NON-APPOINTMENT (OUTPATIENT)
Age: 58
End: 2023-12-19

## 2023-12-21 RX ORDER — OXYCODONE AND ACETAMINOPHEN 5; 325 MG/1; MG/1
5-325 TABLET ORAL
Qty: 7 | Refills: 0 | Status: ACTIVE | COMMUNITY
Start: 2023-12-21 | End: 1900-01-01

## 2023-12-27 ENCOUNTER — APPOINTMENT (OUTPATIENT)
Dept: SURGERY | Facility: CLINIC | Age: 58
End: 2023-12-27
Payer: COMMERCIAL

## 2023-12-27 VITALS
DIASTOLIC BLOOD PRESSURE: 90 MMHG | SYSTOLIC BLOOD PRESSURE: 144 MMHG | TEMPERATURE: 98.6 F | BODY MASS INDEX: 23.93 KG/M2 | HEIGHT: 74 IN | OXYGEN SATURATION: 97 % | WEIGHT: 186.44 LBS | HEART RATE: 69 BPM

## 2023-12-27 DIAGNOSIS — K40.90 UNILATERAL INGUINAL HERNIA, W/OUT OBSTRUCTION OR GANGRENE, NOT SPECIFIED AS RECURRENT: ICD-10-CM

## 2023-12-27 PROCEDURE — 99024 POSTOP FOLLOW-UP VISIT: CPT

## 2023-12-27 NOTE — PHYSICAL EXAM
[Abdominal Masses] : No abdominal masses [Abdomen Tenderness] : ~T ~M No abdominal tenderness [Tender] : was nontender [Enlarged] : not enlarged [de-identified] : NAD, comfortable [de-identified] : Normocephalic, atraumatic. No scleral icterus.  [de-identified] : Supple, no JVD or cervical lymphadenopathy.  [de-identified] : No respiratory distress  [de-identified] : +BS soft, nontender, nondistended. Well healed prior incisions. Right groin incision healing well, c/d/i. No surrounding erythema or induration. No evidence of hernia recurrence on exam with valsalva.

## 2023-12-27 NOTE — ASSESSMENT
[FreeTextEntry1] : Pleasant 59 y/o male now s/p Open Right inguinal hernia repair with mesh on 12/18/23.  Patient seems to be doing well post operatively and recovering as expected. Discussed gradual return to normal activity and natural scar maturation. Has my direct contact information. He will f/u as needed.   Plan: -F/u as needed

## 2023-12-27 NOTE — HISTORY OF PRESENT ILLNESS
[de-identified] : This is a 59 y/o male presenting to the office for evaluation and management of a Right inguinal hernia. Pmhx of diverticulosis s/p laparoscopic converted to open colectomy with ileorectal anastomosis (4/25/23) c/b anastomotic leak requiring RTOR for exploratory lap with open creation of diverting loop ileostomy (5/2/23), s/p ileostomy closure (7/18/23), hx of left inguinal hernia repair. Here today for evaluation of Right groin bulge. He reports he first noted the bulge x 3 months ago. He reports the bulge increases in size when straining to have a bowel movement. He denies significant pain, however, does have discomfort especially with prolonged standing. He underwent a CT scan of the pelvis which revealed a fat-containing Right inguinal herina. He denies any urinary or obstructive issues.  [de-identified] : 12-27-23: Patient returns today for a routine post operative visit. Now s/p open Right inguinal hernia repair with mesh on 12/18/23. He states he is overall feeling well. He is eating and drinking as usual. He is havig regular bowel movements and voiding as usual. Denies any discomfort or pain. He is ambulating often with no issues. Denies fever, chest pain, shortness of breath, nausea, vomiting or constipation.

## 2024-02-22 NOTE — HISTORY OF PRESENT ILLNESS
[FreeTextEntry1] : 58 yo M presents for evaluation of "heavy rectal bleeding," referred by Dr. Aime Deleon\par PMH HTN, HLD, sleep apnea, kidney stones\par PSH hernia repair, partial colon resection, h/o RBL x 2 (1990s, and recently w/ Dr. Deleon)\par  \par Denies FMH colorectal CA\par Last colonoscopy 3/10/23 at [facility], no findings per pt\par h/o colon polyps and diverticulosis throughout colon per pt\par \par c/o of h/o heavy rectal bleeding over the years. Worsened in 2018 and had rectal bleeding a few times while at work. Had presented to ERs in the past, colonoscopies have been completed multiple times, no identifiable source of bleeding. 3/2018 h/o syncopal episode, seen at [facility], s/p transfusions x 3. \par April 2019 seen by CRS Korey Godinez who suggested colon resection for diverticulosis as possible source of bleed\par Aug 2021 Hospitalized again at [facility] for rectal bleeding, s/p blood transfusion x 3, transferred to [facility] in [place], bleeding eventually resolved. Colonoscopy completed, s/p partial colon resection 10/2021 w/ Dr. Godinez for "bleeding vein"\par \par Pt had been doing well and no bleeding episodes until December 2022. Pt had return of bleeding in setting of straining and presented to [facility], hospitalized, colonoscopy performed and area of active bleed stapled per pt.  A couple months later had bleeding again which resolved on its own. Colonoscopy noted polyp which was removed. \par Had BRB again, seen at [facility] ER, hgb over 7, no imaging or interventions per pt.\par Seen by LAMIN Deleon, s/p rubber band ligation ~ one month ago which he tolerated well.\par This past Thursday had three instances of heavy bleeding. Seen at [facility] ER, had more rectal bleeding, pt reports while waiting he stood up, had syncopal episode, then pt noticed "spurting" blood, s/p transfusion. Colonoscopy completed which was normal per pt and he was discharged. \par \par BMs have been varying since most recent hospitalization. Pt had diarrhea this past weekend and treated w/ Imodium then didn't have a BM, now taking stool softener occasionally.\par \par BH: typically daily, however the last 6 weeks was having 3-4 smaller BMs and felt incomplete evacuation\par Takes Metamucil 1-2 times per week\par Drinking 3-4 glasses of tea (one caffeinated, remaining are herbal). Otherwise doesn't drink water\par Denies ASA/NSAID use\par \par BH:\par  intake of dietary fiber and water\par  use of stool softeners or fiber supplements\par \par \par  ASA/NSAIDs in last 7 days\par 
22-Feb-2024 01:16

## 2024-03-07 NOTE — PRE-OP CHECKLIST - ALLERGIES REVIEWED
Chronic, stable. The patient has a diagnosis of atrial fibrillation which requires chronic anticoagulation. Continue with current defined treatment plan: apixaban (ELIQUIS) 5mg Tab . Follow-up at least annually.     done

## 2024-03-29 ENCOUNTER — LABORATORY RESULT (OUTPATIENT)
Age: 59
End: 2024-03-29

## 2024-03-30 ENCOUNTER — NON-APPOINTMENT (OUTPATIENT)
Age: 59
End: 2024-03-30

## 2024-04-02 ENCOUNTER — NON-APPOINTMENT (OUTPATIENT)
Age: 59
End: 2024-04-02

## 2024-04-02 LAB
C DIFF TOXIN B QL PCR REFLEX: NORMAL
CDIFF BY PCR: DETECTED
GDH ANTIGEN: DETECTED
GI PCR PANEL: NOT DETECTED
TOXIN A AND B: NOT DETECTED

## 2024-07-16 NOTE — PHYSICAL THERAPY INITIAL EVALUATION ADULT - WEIGHT-BEARING RESTRICTIONS: STAND/SIT, REHAB EVAL
Detail Level: Simple Instructions: This plan will send the code FBSE to the PM system.  DO NOT or CHANGE the price. Price (Do Not Change): 0.00 full weight-bearing

## 2025-07-09 NOTE — ANESTHESIA FOLLOW-UP NOTE - NSCONDITION_GEN_ALL_CORE
Stable
Statement Selected
Otezla Pregnancy And Lactation Text: This medication is Pregnancy Category C and it isn't known if it is safe during pregnancy. It is unknown if it is excreted in breast milk.

## (undated) DEVICE — LIGASURE IMPACT

## (undated) DEVICE — SOL IRR POUR NS 0.9% 500ML

## (undated) DEVICE — VISITEC 4X4

## (undated) DEVICE — DRSG TEGADERM 4X4.75"

## (undated) DEVICE — SUT VICRYL 3-0 27" SH UNDYED

## (undated) DEVICE — VENODYNE/SCD SLEEVE CALF MEDIUM

## (undated) DEVICE — SUT VICRYL 3-0 18" SH (POP-OFF)

## (undated) DEVICE — SUT PDS II 1 27" CT-1

## (undated) DEVICE — SUT SILK 4-0 30" SH

## (undated) DEVICE — PREP CHLORAPREP HI-LITE ORANGE 26ML

## (undated) DEVICE — GLV 7.5 PROTEXIS (WHITE)

## (undated) DEVICE — TUBING STRYKER PNEUMOCLEAR HIGH FLOW

## (undated) DEVICE — SUPP ATHLETIC MALE XLG 44-55IN

## (undated) DEVICE — PACK GENERAL MINOR

## (undated) DEVICE — SUT MONOCRYL 4-0 18" PS-2

## (undated) DEVICE — PACK EXPLORATORY LAPAROTOMY

## (undated) DEVICE — PACK GENERAL CLOSING

## (undated) DEVICE — TROCAR COVIDIEN VERSAPORT BLADELESS OPTICAL 5MM STANDARD

## (undated) DEVICE — ELCTR BOVIE PENCIL HANDPIECE ROCKER SWITCH 15FT

## (undated) DEVICE — POOLE SUCTION TIP

## (undated) DEVICE — DRSG DERMABOND 0.7ML

## (undated) DEVICE — Device

## (undated) DEVICE — LUBRICATING JELLY ONESHOT 1.25OZ

## (undated) DEVICE — POSITIONER FOAM EGG CRATE ULNAR 2PCS (PINK)

## (undated) DEVICE — SUT SILK 0 30" TIES

## (undated) DEVICE — STAPLER COVIDIEN ENDO GIA SHORT HANDLE

## (undated) DEVICE — PACK GENERAL LAPAROSCOPY

## (undated) DEVICE — WARMING BLANKET UPPER ADULT

## (undated) DEVICE — GOWN XL

## (undated) DEVICE — SYR LUER LOK 30CC

## (undated) DEVICE — SUT VICRYL 2-0 27" CT-2 UNDYED

## (undated) DEVICE — DRSG 4 X 8

## (undated) DEVICE — STOPCOCK 4-WAY

## (undated) DEVICE — SUT VICRYL 2-0 27" SH

## (undated) DEVICE — SUT SILK 3-0 30" TIES

## (undated) DEVICE — NDL HYPO SAFE 22G X 1.5" (BLACK)

## (undated) DEVICE — SUT PDS PLUS 2-0 27" SH

## (undated) DEVICE — SPECIMEN CONTAINER 4OZ

## (undated) DEVICE — DRAPE 3/4 SHEET 52X76"

## (undated) DEVICE — SUT PROLENE 2-0 30" CT-2

## (undated) DEVICE — GLV 8 PROTEXIS (WHITE)

## (undated) DEVICE — SUT SILK 3-0 18" SH (POP-OFF)

## (undated) DEVICE — SUT VICRYL PLUS 0 36" CT-1

## (undated) DEVICE — DRSG STERISTRIPS 0.5 X 4"

## (undated) DEVICE — DRAPE TOWEL BLUE 17" X 24"

## (undated) DEVICE — DRAPE LIGHT HANDLE COVER (BLUE)

## (undated) DEVICE — SUT VICRYL 0 54" TIES

## (undated) DEVICE — SUT PROLENE 4-0 36" SH

## (undated) DEVICE — BLADE SURGICAL #15 CARBON

## (undated) DEVICE — SUCTION YANKAUER BULBOUS TIP W VENT

## (undated) DEVICE — ELCTR BOVIE TIP BLADE VALLEYLAB 6.5"

## (undated) DEVICE — GOWN LG

## (undated) DEVICE — STAPLER SKIN VISI-STAT 35 WIDE

## (undated) DEVICE — GLV 7 PROTEXIS (WHITE)

## (undated) DEVICE — LIGASURE EXACT DISSECTOR

## (undated) DEVICE — SYR LUER LOK 50CC

## (undated) DEVICE — TROCAR COVIDIEN VERSAONE FIXATION CANNULA 5MM

## (undated) DEVICE — SUT VICRYL 3-0 27" SH

## (undated) DEVICE — TAPE SILK 3"

## (undated) DEVICE — PACK PERI GYN

## (undated) DEVICE — INSUFFLATION NDL COVIDIEN SURGINEEDLE VERESS 120MM

## (undated) DEVICE — D HELP - CLEARVIEW CLEARIFY SYSTEM

## (undated) DEVICE — DRAPE 1/2 SHEET 40X57"

## (undated) DEVICE — DRSG SUPPORTER ADULT 3" WAISTBAND LRG

## (undated) DEVICE — SUT PDS II PLUS 1 27" CT-1

## (undated) DEVICE — SUT PDS II PLUS 1 96" TP-1

## (undated) DEVICE — PREP BETADINE SPONGE STICKS

## (undated) DEVICE — SUT SILK 3-0 30" SH

## (undated) DEVICE — STAPLER SKIN PROXIMATE

## (undated) DEVICE — POSITIONER STRAP KNEE & BODY 3X60" DISP

## (undated) DEVICE — MONOPOLAR CORD HI FREQ DISPOSABLE

## (undated) DEVICE — SYR ASEPTO

## (undated) DEVICE — NDL SPINAL 22G X 3.5" (BLACK)

## (undated) DEVICE — FOLEY TRAY 16FR 5CC LF UMETER CLOSED

## (undated) DEVICE — SUT MONOCRYL 4-0 27" PS-2 UNDYED

## (undated) DEVICE — MARKING PEN W RULER

## (undated) DEVICE — DRSG SUPPORTER ADULT 3" WAISTBAND MED

## (undated) DEVICE — TIP METZENBAUM SCISSOR MONOPOLAR ENDOCUT (ORANGE)

## (undated) DEVICE — SUT SILK 2-0 30" TIES

## (undated) DEVICE — DRAPE TOP SHEET 53" X 101"

## (undated) DEVICE — GLV 6.5 PROTEXIS (WHITE)